# Patient Record
Sex: FEMALE | Race: WHITE | NOT HISPANIC OR LATINO | URBAN - METROPOLITAN AREA
[De-identification: names, ages, dates, MRNs, and addresses within clinical notes are randomized per-mention and may not be internally consistent; named-entity substitution may affect disease eponyms.]

---

## 2017-09-04 ENCOUNTER — EMERGENCY (EMERGENCY)
Facility: HOSPITAL | Age: 72
LOS: 0 days | Discharge: HOME | End: 2017-09-04
Attending: EMERGENCY MEDICINE | Admitting: EMERGENCY MEDICINE
Payer: MEDICARE

## 2017-09-04 VITALS
SYSTOLIC BLOOD PRESSURE: 153 MMHG | TEMPERATURE: 98 F | HEART RATE: 82 BPM | RESPIRATION RATE: 18 BRPM | OXYGEN SATURATION: 100 % | HEIGHT: 61 IN | DIASTOLIC BLOOD PRESSURE: 73 MMHG | WEIGHT: 151.02 LBS

## 2017-09-04 DIAGNOSIS — S61.412A LACERATION WITHOUT FOREIGN BODY OF LEFT HAND, INITIAL ENCOUNTER: ICD-10-CM
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
## 2017-09-04 DIAGNOSIS — I10 ESSENTIAL (PRIMARY) HYPERTENSION: ICD-10-CM

## 2017-09-04 DIAGNOSIS — F32.9 MAJOR DEPRESSIVE DISORDER, SINGLE EPISODE, UNSPECIFIED: ICD-10-CM

## 2017-09-04 DIAGNOSIS — E78.5 HYPERLIPIDEMIA, UNSPECIFIED: ICD-10-CM

## 2017-09-04 DIAGNOSIS — Z79.82 LONG TERM (CURRENT) USE OF ASPIRIN: ICD-10-CM

## 2017-09-04 DIAGNOSIS — Z79.899 OTHER LONG TERM (CURRENT) DRUG THERAPY: ICD-10-CM

## 2017-09-04 PROCEDURE — 12001 RPR S/N/AX/GEN/TRNK 2.5CM/<: CPT

## 2017-09-04 PROCEDURE — 99283 EMERGENCY DEPT VISIT LOW MDM: CPT | Mod: 25

## 2017-09-04 RX ORDER — TETANUS TOXOID, REDUCED DIPHTHERIA TOXOID AND ACELLULAR PERTUSSIS VACCINE, ADSORBED 5; 2.5; 8; 8; 2.5 [IU]/.5ML; [IU]/.5ML; UG/.5ML; UG/.5ML; UG/.5ML
0.5 SUSPENSION INTRAMUSCULAR ONCE
Qty: 0 | Refills: 0 | Status: COMPLETED | OUTPATIENT
Start: 2017-09-04 | End: 2017-09-04

## 2017-09-04 RX ADMIN — TETANUS TOXOID, REDUCED DIPHTHERIA TOXOID AND ACELLULAR PERTUSSIS VACCINE, ADSORBED 0.5 MILLILITER(S): 5; 2.5; 8; 8; 2.5 SUSPENSION INTRAMUSCULAR at 05:24

## 2017-09-04 NOTE — ED PROVIDER NOTE - SKIN, MLM
Skin normal color for race, warm, dry and intact. No evidence of rash. Skin normal color for race, warm, dry and intact. No evidence of rash.  1cm superficial laceration to web space between 4th and 5th digits of right hand.  Mild venous bleeding.  N/V intact.

## 2017-09-04 NOTE — ED ADULT TRIAGE NOTE - CHIEF COMPLAINT QUOTE
Patient states that she was cutting "apart two turkey burgers, when I sliced the inside of my hand." Patient has a laceration in the crease between the fifth and the fourth fingers of her left hand. Bleeding controlled with guaze and direct pressure. Patient on aspirin.

## 2017-09-04 NOTE — ED ADULT NURSE NOTE - OBJECTIVE STATEMENT
pt arrives to ED complaining of left hand laceration. pt was trying to separate two frozen burgers with a knife and cut inside her fourth and fifth fingers on her left hand. pt alert and oriented x 4. vss.

## 2018-07-01 ENCOUNTER — EMERGENCY (EMERGENCY)
Facility: HOSPITAL | Age: 73
LOS: 0 days | Discharge: HOME | End: 2018-07-01
Attending: EMERGENCY MEDICINE | Admitting: EMERGENCY MEDICINE
Payer: MEDICARE

## 2018-07-01 VITALS
SYSTOLIC BLOOD PRESSURE: 139 MMHG | TEMPERATURE: 98 F | HEART RATE: 88 BPM | RESPIRATION RATE: 18 BRPM | OXYGEN SATURATION: 97 % | DIASTOLIC BLOOD PRESSURE: 85 MMHG

## 2018-07-01 VITALS — WEIGHT: 160.06 LBS
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
## 2018-07-01 DIAGNOSIS — S30.860A INSECT BITE (NONVENOMOUS) OF LOWER BACK AND PELVIS, INITIAL ENCOUNTER: ICD-10-CM
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
## 2018-07-01 PROCEDURE — 99283 EMERGENCY DEPT VISIT LOW MDM: CPT

## 2018-07-01 NOTE — ED STATDOCS - PROGRESS NOTE DETAILS
Patient seen and evaluated with ED attending at intake.  Unable to locate puncture wound on patient's skin, she removed entire tick including head, tick is alive and not engorged, low chance of transmission as it has not been attached for a significant amount of time.  Will send tick for analysis -Arnoldo Nolasco PA-C

## 2018-07-01 NOTE — ED ADULT TRIAGE NOTE - CHIEF COMPLAINT QUOTE
pt c/o tick btie to left flank area. pt removed the tick, was unable to see if she had a rash, pt concerened it may be a deer tick, tick in jar. no fevers, unknown how long tick was on body

## 2018-07-01 NOTE — ED STATDOCS - ATTENDING CONTRIBUTION TO CARE
I, Ajay French MD, personally saw the patient with the PA, and completed the key components of the history and physical exam. I then discussed the management plan with the PA.

## 2018-07-01 NOTE — ED STATDOCS - MEDICAL DECISION MAKING DETAILS
elderly female ambulatory to ED after evaluation self removed tick from left lower back, patient essentially asymptomatic, pe : no rash, no puncture mohini; tick in  jar intact +living, will send tick to lab for analysis no indication for propylitic ABx, PCP follow up this week.

## 2018-07-01 NOTE — ED STATDOCS - OBJECTIVE STATEMENT
74 y/o F with a PMHx of hypothyroid, HTN, HLD, Depression presents to the ED for evaluation s/p tick bite. Pt was bit by a tick a couple of hours ago while she was watering plants in her front yard. Pt notes tick bite to the left lower back, +sensitivity to site. Pt pulled the tick off by her fingers. No known bleeding to site. Patient [place] tick infested neighborhood. Alive Tick at bedside encaptured in benja jar. Allergic to Penicillin, synthetic thyroid, Codeine, Sulfa.  PMD .

## 2018-07-01 NOTE — ED STATDOCS - SKIN, MLM
no erythema, no swelling, no definite puncture mohini, no rash. skin normal color for race, warm, dry and intact.

## 2018-07-04 LAB
CULTURE RESULTS: SIGNIFICANT CHANGE UP
SPECIMEN SOURCE: SIGNIFICANT CHANGE UP

## 2019-10-16 RX ORDER — AZITHROMYCIN 500 MG/1
0 TABLET, FILM COATED ORAL
Qty: 0 | Refills: 0 | DISCHARGE
Start: 2019-10-16 | End: 2019-10-20

## 2019-10-31 ENCOUNTER — INPATIENT (INPATIENT)
Facility: HOSPITAL | Age: 74
LOS: 1 days | Discharge: HOME | DRG: 641 | End: 2019-11-02
Attending: INTERNAL MEDICINE | Admitting: EMERGENCY MEDICINE
Payer: MEDICARE

## 2019-10-31 VITALS
DIASTOLIC BLOOD PRESSURE: 66 MMHG | HEART RATE: 96 BPM | HEIGHT: 60 IN | WEIGHT: 167.99 LBS | TEMPERATURE: 98 F | SYSTOLIC BLOOD PRESSURE: 145 MMHG | OXYGEN SATURATION: 100 % | RESPIRATION RATE: 18 BRPM

## 2019-10-31 DIAGNOSIS — R53.1 WEAKNESS: ICD-10-CM

## 2019-10-31 PROBLEM — I10 ESSENTIAL (PRIMARY) HYPERTENSION: Chronic | Status: ACTIVE | Noted: 2017-09-04

## 2019-10-31 PROBLEM — E78.5 HYPERLIPIDEMIA, UNSPECIFIED: Chronic | Status: ACTIVE | Noted: 2017-09-04

## 2019-10-31 PROBLEM — F32.9 MAJOR DEPRESSIVE DISORDER, SINGLE EPISODE, UNSPECIFIED: Chronic | Status: ACTIVE | Noted: 2017-09-04

## 2019-10-31 LAB
ANION GAP SERPL CALC-SCNC: 5 MMOL/L — SIGNIFICANT CHANGE UP (ref 5–17)
APPEARANCE UR: ABNORMAL
BILIRUB UR-MCNC: NEGATIVE — SIGNIFICANT CHANGE UP
BUN SERPL-MCNC: 17 MG/DL — SIGNIFICANT CHANGE UP (ref 7–23)
CALCIUM SERPL-MCNC: 8.9 MG/DL — SIGNIFICANT CHANGE UP (ref 8.5–10.1)
CHLORIDE SERPL-SCNC: 107 MMOL/L — SIGNIFICANT CHANGE UP (ref 96–108)
CO2 SERPL-SCNC: 26 MMOL/L — SIGNIFICANT CHANGE UP (ref 22–31)
COLOR SPEC: YELLOW — SIGNIFICANT CHANGE UP
CREAT SERPL-MCNC: 0.87 MG/DL — SIGNIFICANT CHANGE UP (ref 0.5–1.3)
DIFF PNL FLD: ABNORMAL
GLUCOSE SERPL-MCNC: 100 MG/DL — HIGH (ref 70–99)
GLUCOSE UR QL: NEGATIVE MG/DL — SIGNIFICANT CHANGE UP
HCT VFR BLD CALC: 41.9 % — SIGNIFICANT CHANGE UP (ref 34.5–45)
HGB BLD-MCNC: 13.7 G/DL — SIGNIFICANT CHANGE UP (ref 11.5–15.5)
KETONES UR-MCNC: ABNORMAL
LEUKOCYTE ESTERASE UR-ACNC: ABNORMAL
MCHC RBC-ENTMCNC: 30.2 PG — SIGNIFICANT CHANGE UP (ref 27–34)
MCHC RBC-ENTMCNC: 32.7 GM/DL — SIGNIFICANT CHANGE UP (ref 32–36)
MCV RBC AUTO: 92.3 FL — SIGNIFICANT CHANGE UP (ref 80–100)
NITRITE UR-MCNC: NEGATIVE — SIGNIFICANT CHANGE UP
PH UR: 6 — SIGNIFICANT CHANGE UP (ref 5–8)
PLATELET # BLD AUTO: 191 K/UL — SIGNIFICANT CHANGE UP (ref 150–400)
POTASSIUM SERPL-MCNC: 3.9 MMOL/L — SIGNIFICANT CHANGE UP (ref 3.5–5.3)
POTASSIUM SERPL-SCNC: 3.9 MMOL/L — SIGNIFICANT CHANGE UP (ref 3.5–5.3)
PROT UR-MCNC: 15 MG/DL
RBC # BLD: 4.54 M/UL — SIGNIFICANT CHANGE UP (ref 3.8–5.2)
RBC # FLD: 13.2 % — SIGNIFICANT CHANGE UP (ref 10.3–14.5)
SODIUM SERPL-SCNC: 138 MMOL/L — SIGNIFICANT CHANGE UP (ref 135–145)
SP GR SPEC: 1.02 — SIGNIFICANT CHANGE UP (ref 1.01–1.02)
TROPONIN I SERPL-MCNC: 0.07 NG/ML — HIGH (ref 0.01–0.04)
TROPONIN I SERPL-MCNC: 0.07 NG/ML — HIGH (ref 0.01–0.04)
UROBILINOGEN FLD QL: 1 MG/DL
WBC # BLD: 5.29 K/UL — SIGNIFICANT CHANGE UP (ref 3.8–10.5)
WBC # FLD AUTO: 5.29 K/UL — SIGNIFICANT CHANGE UP (ref 3.8–10.5)

## 2019-10-31 PROCEDURE — 71045 X-RAY EXAM CHEST 1 VIEW: CPT | Mod: 26

## 2019-10-31 PROCEDURE — 86803 HEPATITIS C AB TEST: CPT

## 2019-10-31 PROCEDURE — 80061 LIPID PANEL: CPT

## 2019-10-31 PROCEDURE — 83735 ASSAY OF MAGNESIUM: CPT

## 2019-10-31 PROCEDURE — 83036 HEMOGLOBIN GLYCOSYLATED A1C: CPT

## 2019-10-31 PROCEDURE — 93306 TTE W/DOPPLER COMPLETE: CPT | Mod: 26

## 2019-10-31 PROCEDURE — 93005 ELECTROCARDIOGRAM TRACING: CPT

## 2019-10-31 PROCEDURE — 84100 ASSAY OF PHOSPHORUS: CPT

## 2019-10-31 PROCEDURE — 36415 COLL VENOUS BLD VENIPUNCTURE: CPT

## 2019-10-31 PROCEDURE — 84443 ASSAY THYROID STIM HORMONE: CPT

## 2019-10-31 PROCEDURE — 80048 BASIC METABOLIC PNL TOTAL CA: CPT

## 2019-10-31 PROCEDURE — 84484 ASSAY OF TROPONIN QUANT: CPT

## 2019-10-31 PROCEDURE — 70450 CT HEAD/BRAIN W/O DYE: CPT | Mod: 26

## 2019-10-31 PROCEDURE — 85025 COMPLETE CBC W/AUTO DIFF WBC: CPT

## 2019-10-31 RX ORDER — MIRTAZAPINE 45 MG/1
30 TABLET, ORALLY DISINTEGRATING ORAL AT BEDTIME
Refills: 0 | Status: DISCONTINUED | OUTPATIENT
Start: 2019-10-31 | End: 2019-11-02

## 2019-10-31 RX ORDER — ASPIRIN/CALCIUM CARB/MAGNESIUM 324 MG
325 TABLET ORAL DAILY
Refills: 0 | Status: DISCONTINUED | OUTPATIENT
Start: 2019-10-31 | End: 2019-11-01

## 2019-10-31 RX ORDER — THYROID 120 MG
0 TABLET ORAL
Qty: 0 | Refills: 0 | DISCHARGE

## 2019-10-31 RX ORDER — CITALOPRAM 10 MG/1
0 TABLET, FILM COATED ORAL
Qty: 0 | Refills: 0 | DISCHARGE

## 2019-10-31 RX ORDER — ARIPIPRAZOLE 15 MG/1
5 TABLET ORAL DAILY
Refills: 0 | Status: DISCONTINUED | OUTPATIENT
Start: 2019-10-31 | End: 2019-11-02

## 2019-10-31 RX ORDER — ONDANSETRON 8 MG/1
4 TABLET, FILM COATED ORAL EVERY 6 HOURS
Refills: 0 | Status: DISCONTINUED | OUTPATIENT
Start: 2019-10-31 | End: 2019-11-02

## 2019-10-31 RX ORDER — ASPIRIN/CALCIUM CARB/MAGNESIUM 324 MG
1 TABLET ORAL
Qty: 0 | Refills: 0 | DISCHARGE

## 2019-10-31 RX ORDER — MIRTAZAPINE 45 MG/1
0 TABLET, ORALLY DISINTEGRATING ORAL
Qty: 0 | Refills: 0 | DISCHARGE

## 2019-10-31 RX ORDER — BUPROPION HYDROCHLORIDE 150 MG/1
0 TABLET, EXTENDED RELEASE ORAL
Qty: 0 | Refills: 0 | DISCHARGE

## 2019-10-31 RX ORDER — METOPROLOL TARTRATE 50 MG
0 TABLET ORAL
Qty: 0 | Refills: 0 | DISCHARGE

## 2019-10-31 RX ORDER — SODIUM CHLORIDE 9 MG/ML
1000 INJECTION INTRAMUSCULAR; INTRAVENOUS; SUBCUTANEOUS
Refills: 0 | Status: DISCONTINUED | OUTPATIENT
Start: 2019-10-31 | End: 2019-11-02

## 2019-10-31 RX ORDER — CLONAZEPAM 1 MG
0.5 TABLET ORAL
Refills: 0 | Status: DISCONTINUED | OUTPATIENT
Start: 2019-10-31 | End: 2019-11-02

## 2019-10-31 RX ORDER — ASPIRIN/CALCIUM CARB/MAGNESIUM 324 MG
325 TABLET ORAL ONCE
Refills: 0 | Status: COMPLETED | OUTPATIENT
Start: 2019-10-31 | End: 2019-10-31

## 2019-10-31 RX ORDER — CITALOPRAM 10 MG/1
40 TABLET, FILM COATED ORAL DAILY
Refills: 0 | Status: DISCONTINUED | OUTPATIENT
Start: 2019-10-31 | End: 2019-11-02

## 2019-10-31 RX ORDER — METOPROLOL TARTRATE 50 MG
25 TABLET ORAL DAILY
Refills: 0 | Status: DISCONTINUED | OUTPATIENT
Start: 2019-10-31 | End: 2019-11-02

## 2019-10-31 RX ADMIN — Medication 325 MILLIGRAM(S): at 12:30

## 2019-10-31 RX ADMIN — Medication 0.5 MILLIGRAM(S): at 17:12

## 2019-10-31 RX ADMIN — MIRTAZAPINE 30 MILLIGRAM(S): 45 TABLET, ORALLY DISINTEGRATING ORAL at 21:53

## 2019-10-31 NOTE — H&P ADULT - NSHPLABSRESULTS_GEN_ALL_CORE
CARDIAC MARKERS ( 31 Oct 2019 14:11 )  0.075 ng/mL / x     / x     / x     / x      CARDIAC MARKERS ( 31 Oct 2019 11:18 )  0.067 ng/mL / x     / x     / x     / x                                13.7   5.29  )-----------( 191      ( 31 Oct 2019 11:18 )             41.9     31 Oct 2019 11:18    138    |  107    |  17     ----------------------------<  100    3.9     |  26     |  0.87     Ca    8.9        31 Oct 2019 11:18        CAPILLARY BLOOD GLUCOSE          Urinalysis Basic - ( 31 Oct 2019 11:18 )    Color: Yellow / Appearance: Slightly Turbid / S.020 / pH: x  Gluc: x / Ketone: Small  / Bili: Negative / Urobili: 1 mg/dL   Blood: x / Protein: 15 mg/dL / Nitrite: Negative   Leuk Esterase: Small / RBC: 6-10 /HPF / WBC 3-5   Sq Epi: x / Non Sq Epi: Few / Bacteria: x

## 2019-10-31 NOTE — H&P ADULT - NSHPREVIEWOFSYSTEMS_GEN_ALL_CORE
(-)Fever, chills, cough, sob, headache, dizziness, palpitations, abd pain, n/v/d, leg swelling  (+)weakness, CP

## 2019-10-31 NOTE — ED ADULT TRIAGE NOTE - CHIEF COMPLAINT QUOTE
pt brought by EMS from home for eval of feeling weak and unwell since last week. pt notes she is being treated for a foot infection. denies trauma or fall. no nuchal rigidity noted. no fever noted.

## 2019-10-31 NOTE — ED ADULT NURSE NOTE - NSIMPLEMENTINTERV_GEN_ALL_ED
Implemented All Fall Risk Interventions:  [place] to call system. Call bell, personal items and telephone within reach. Instruct patient to call for assistance. Room bathroom lighting operational. Non-slip footwear when patient is off stretcher. Physically safe environment: no spills, clutter or unnecessary equipment. Stretcher in lowest position, wheels locked, appropriate side rails in place. Provide visual cue, wrist band, yellow gown, etc. Monitor gait and stability. Monitor for mental status changes and reorient to person, place, and time. Review medications for side effects contributing to fall risk. Reinforce activity limits and safety measures with patient and family.

## 2019-10-31 NOTE — ED ADULT NURSE REASSESSMENT NOTE - NS ED NURSE REASSESS COMMENT FT1
Patient A&Ox4, resting in bed. VSS, denies pain/discomfort at this time. Generalized weakness, patient assisted with repositioning for comfort & safety. No s/s of distress present. Wait time explained, hospitalist consult pending. Purposeful proactive rounding completed.

## 2019-10-31 NOTE — H&P ADULT - HISTORY OF PRESENT ILLNESS
Patient is 75yo female with PMHx depression, HTN, HLD, presents to the hospital with complaints generalized fatigue, weakness, and CP. Pt reports symptoms began last night, characterized by weakness, fatigue, and CP. Pt notes the symptoms lasted few hours, and now have dissipated. Currently denies fever, chills, CP, SOB, palpitations, HA, dizziness. No other constitutional symptoms.

## 2019-10-31 NOTE — ED PROVIDER NOTE - OBJECTIVE STATEMENT
73 y/o female with a PMHx of depression, HTN, HLD presents to the ED BIBEMS c/o generalized weakness. +neck pain. Pt notes she ran out of her medications. Pt reports having CP last night which has resolved. No recent falls. No recent trauma. Allergies: Codeine, Synthroid, Penicillin. Nonsmoker. No EtOH use. No drug use. No other complaints at this time. PCP: Dr. Santos.

## 2019-10-31 NOTE — ED ADULT NURSE REASSESSMENT NOTE - NS ED NURSE REASSESS COMMENT FT1
Patient remains as previously assessed. No reports of pain or s/s of distress on my time. Hand-off report to RN Ninfa, awaiting transport to . Safety & comfort measures in place, purposeful proactive rounding on my time.

## 2019-10-31 NOTE — ED ADULT NURSE NOTE - OBJECTIVE STATEMENT
Pt presents to the ED for eval of generalized weakness, feeling "unwell" and states she is confused as of lately. Pt states she had an episode of chest discomfort last evening which has since resolved. Pt a+ox3 but clearly confused at times. Pt states she lives at home by herself and doesn't have family.

## 2019-11-01 LAB
ADD ON TEST-SPECIMEN IN LAB: SIGNIFICANT CHANGE UP
ADD ON TEST-SPECIMEN IN LAB: SIGNIFICANT CHANGE UP
ANION GAP SERPL CALC-SCNC: 7 MMOL/L — SIGNIFICANT CHANGE UP (ref 5–17)
BASOPHILS # BLD AUTO: 0.02 K/UL — SIGNIFICANT CHANGE UP (ref 0–0.2)
BASOPHILS NFR BLD AUTO: 0.3 % — SIGNIFICANT CHANGE UP (ref 0–2)
BUN SERPL-MCNC: 13 MG/DL — SIGNIFICANT CHANGE UP (ref 7–23)
CALCIUM SERPL-MCNC: 8.3 MG/DL — LOW (ref 8.5–10.1)
CHLORIDE SERPL-SCNC: 111 MMOL/L — HIGH (ref 96–108)
CHOLEST SERPL-MCNC: 144 MG/DL — SIGNIFICANT CHANGE UP (ref 10–199)
CO2 SERPL-SCNC: 24 MMOL/L — SIGNIFICANT CHANGE UP (ref 22–31)
CREAT SERPL-MCNC: 0.76 MG/DL — SIGNIFICANT CHANGE UP (ref 0.5–1.3)
EOSINOPHIL # BLD AUTO: 0.07 K/UL — SIGNIFICANT CHANGE UP (ref 0–0.5)
EOSINOPHIL NFR BLD AUTO: 1.2 % — SIGNIFICANT CHANGE UP (ref 0–6)
GLUCOSE SERPL-MCNC: 74 MG/DL — SIGNIFICANT CHANGE UP (ref 70–99)
HBA1C BLD-MCNC: 5.6 % — SIGNIFICANT CHANGE UP (ref 4–5.6)
HCT VFR BLD CALC: 38.5 % — SIGNIFICANT CHANGE UP (ref 34.5–45)
HCV AB S/CO SERPL IA: 0.18 S/CO — SIGNIFICANT CHANGE UP (ref 0–0.99)
HCV AB SERPL-IMP: SIGNIFICANT CHANGE UP
HDLC SERPL-MCNC: 37 MG/DL — LOW
HGB BLD-MCNC: 12.6 G/DL — SIGNIFICANT CHANGE UP (ref 11.5–15.5)
IMM GRANULOCYTES NFR BLD AUTO: 0.3 % — SIGNIFICANT CHANGE UP (ref 0–1.5)
LIPID PNL WITH DIRECT LDL SERPL: 87 MG/DL — SIGNIFICANT CHANGE UP
LYMPHOCYTES # BLD AUTO: 1.5 K/UL — SIGNIFICANT CHANGE UP (ref 1–3.3)
LYMPHOCYTES # BLD AUTO: 26.2 % — SIGNIFICANT CHANGE UP (ref 13–44)
MAGNESIUM SERPL-MCNC: 1.8 MG/DL — SIGNIFICANT CHANGE UP (ref 1.6–2.6)
MCHC RBC-ENTMCNC: 30 PG — SIGNIFICANT CHANGE UP (ref 27–34)
MCHC RBC-ENTMCNC: 32.7 GM/DL — SIGNIFICANT CHANGE UP (ref 32–36)
MCV RBC AUTO: 91.7 FL — SIGNIFICANT CHANGE UP (ref 80–100)
MONOCYTES # BLD AUTO: 0.57 K/UL — SIGNIFICANT CHANGE UP (ref 0–0.9)
MONOCYTES NFR BLD AUTO: 9.9 % — SIGNIFICANT CHANGE UP (ref 2–14)
NEUTROPHILS # BLD AUTO: 3.55 K/UL — SIGNIFICANT CHANGE UP (ref 1.8–7.4)
NEUTROPHILS NFR BLD AUTO: 62.1 % — SIGNIFICANT CHANGE UP (ref 43–77)
PHOSPHATE SERPL-MCNC: 2.7 MG/DL — SIGNIFICANT CHANGE UP (ref 2.5–4.5)
PLATELET # BLD AUTO: 163 K/UL — SIGNIFICANT CHANGE UP (ref 150–400)
POTASSIUM SERPL-MCNC: 3.2 MMOL/L — LOW (ref 3.5–5.3)
POTASSIUM SERPL-SCNC: 3.2 MMOL/L — LOW (ref 3.5–5.3)
RBC # BLD: 4.2 M/UL — SIGNIFICANT CHANGE UP (ref 3.8–5.2)
RBC # FLD: 13.3 % — SIGNIFICANT CHANGE UP (ref 10.3–14.5)
SODIUM SERPL-SCNC: 142 MMOL/L — SIGNIFICANT CHANGE UP (ref 135–145)
TOTAL CHOLESTEROL/HDL RATIO MEASUREMENT: 3.9 RATIO — SIGNIFICANT CHANGE UP (ref 3.3–7.1)
TRIGL SERPL-MCNC: 100 MG/DL — SIGNIFICANT CHANGE UP (ref 10–149)
TSH SERPL-MCNC: 1.48 UU/ML — SIGNIFICANT CHANGE UP (ref 0.34–4.82)
WBC # BLD: 5.73 K/UL — SIGNIFICANT CHANGE UP (ref 3.8–10.5)
WBC # FLD AUTO: 5.73 K/UL — SIGNIFICANT CHANGE UP (ref 3.8–10.5)

## 2019-11-01 PROCEDURE — 93010 ELECTROCARDIOGRAM REPORT: CPT

## 2019-11-01 RX ORDER — THYROID 120 MG
1 TABLET ORAL
Qty: 0 | Refills: 0 | DISCHARGE

## 2019-11-01 RX ORDER — HEPARIN SODIUM 5000 [USP'U]/ML
5000 INJECTION INTRAVENOUS; SUBCUTANEOUS EVERY 8 HOURS
Refills: 0 | Status: DISCONTINUED | OUTPATIENT
Start: 2019-11-01 | End: 2019-11-02

## 2019-11-01 RX ORDER — ASPIRIN/CALCIUM CARB/MAGNESIUM 324 MG
81 TABLET ORAL DAILY
Refills: 0 | Status: DISCONTINUED | OUTPATIENT
Start: 2019-11-01 | End: 2019-11-02

## 2019-11-01 RX ORDER — POLYETHYLENE GLYCOL 3350 17 G/17G
17 POWDER, FOR SOLUTION ORAL DAILY
Refills: 0 | Status: DISCONTINUED | OUTPATIENT
Start: 2019-11-01 | End: 2019-11-02

## 2019-11-01 RX ORDER — ACETAMINOPHEN 500 MG
650 TABLET ORAL ONCE
Refills: 0 | Status: COMPLETED | OUTPATIENT
Start: 2019-11-01 | End: 2019-11-01

## 2019-11-01 RX ORDER — POTASSIUM CHLORIDE 20 MEQ
40 PACKET (EA) ORAL EVERY 4 HOURS
Refills: 0 | Status: COMPLETED | OUTPATIENT
Start: 2019-11-01 | End: 2019-11-01

## 2019-11-01 RX ORDER — MUPIROCIN 20 MG/G
1 OINTMENT TOPICAL
Qty: 0 | Refills: 0 | DISCHARGE

## 2019-11-01 RX ADMIN — POLYETHYLENE GLYCOL 3350 17 GRAM(S): 17 POWDER, FOR SOLUTION ORAL at 09:52

## 2019-11-01 RX ADMIN — CITALOPRAM 40 MILLIGRAM(S): 10 TABLET, FILM COATED ORAL at 11:49

## 2019-11-01 RX ADMIN — Medication 0.5 MILLIGRAM(S): at 05:27

## 2019-11-01 RX ADMIN — Medication 81 MILLIGRAM(S): at 11:53

## 2019-11-01 RX ADMIN — Medication 40 MILLIEQUIVALENT(S): at 09:54

## 2019-11-01 RX ADMIN — Medication 650 MILLIGRAM(S): at 21:37

## 2019-11-01 RX ADMIN — ARIPIPRAZOLE 5 MILLIGRAM(S): 15 TABLET ORAL at 11:49

## 2019-11-01 RX ADMIN — Medication 40 MILLIEQUIVALENT(S): at 15:37

## 2019-11-01 RX ADMIN — HEPARIN SODIUM 5000 UNIT(S): 5000 INJECTION INTRAVENOUS; SUBCUTANEOUS at 15:37

## 2019-11-01 RX ADMIN — Medication 1 TABLET(S): at 11:51

## 2019-11-01 RX ADMIN — Medication 650 MILLIGRAM(S): at 20:18

## 2019-11-01 RX ADMIN — Medication 0.5 MILLIGRAM(S): at 17:35

## 2019-11-01 RX ADMIN — MIRTAZAPINE 30 MILLIGRAM(S): 45 TABLET, ORALLY DISINTEGRATING ORAL at 21:15

## 2019-11-01 RX ADMIN — Medication 25 MILLIGRAM(S): at 05:27

## 2019-11-01 RX ADMIN — HEPARIN SODIUM 5000 UNIT(S): 5000 INJECTION INTRAVENOUS; SUBCUTANEOUS at 21:15

## 2019-11-01 NOTE — CONSULT NOTE ADULT - SUBJECTIVE AND OBJECTIVE BOX
CHIEF COMPLAINT: Patient is a 74y old  Female who presents with a chief complaint of weakness, chest pain (31 Oct 2019 15:13)      HPI:  Patient is 73yo female with PMHx depression, HTN, HLD, presents to the hospital with complaints generalized fatigue, weakness, and CP. Pt reports symptoms began last night, characterized by weakness, fatigue, and CP. Pt notes the symptoms lasted few hours, and now have dissipated. Currently denies fever, chills, CP, SOB, palpitations, HA, dizziness. No other constitutional symptoms. (31 Oct 2019 15:13)      PMHx: PAST MEDICAL & SURGICAL HISTORY:  HLD (hyperlipidemia)  HTN (hypertension)  Depression  No significant past surgical history        Soc Hx:       Allergies: Allergies    codeine (Unknown)  penicillins (Other)  Synthroid (Other)    Intolerances          REVIEW OF SYSTEMS:    CONSTITUTIONAL: No weakness, fevers or chills  EYES/ENT: No visual changes;  No vertigo or throat pain   NECK: No pain or stiffness  RESPIRATORY: No cough, wheezing, hemoptysis; No shortness of breath  CARDIOVASCULAR: No chest pain or palpitations  GASTROINTESTINAL: No abdominal or epigastric pain. No nausea, vomiting, or hematemesis; No diarrhea or constipation. No melena or hematochezia.  GENITOURINARY: No dysuria, frequency or hematuria  NEUROLOGICAL: No numbness or weakness  SKIN: No itching, burning, rashes, or lesions   All other review of systems is negative unless indicated above    Vital Signs Last 24 Hrs  T(C): 36.5 (01 Nov 2019 05:17), Max: 36.9 (31 Oct 2019 15:50)  T(F): 97.7 (01 Nov 2019 05:17), Max: 98.5 (31 Oct 2019 15:50)  HR: 81 (01 Nov 2019 05:17) (80 - 96)  BP: 127/61 (01 Nov 2019 05:17) (123/53 - 154/71)  BP(mean): --  RR: 18 (01 Nov 2019 05:17) (16 - 18)  SpO2: 94% (01 Nov 2019 05:17) (94% - 100%)    I&O's Summary    31 Oct 2019 07:01  -  01 Nov 2019 07:00  --------------------------------------------------------  IN: 969.7 mL / OUT: 0 mL / NET: 969.7 mL            PHYSICAL EXAM:   Constitutional: NAD, awake and alert, well-developed  HEENT: PERR, EOMI, Normal Hearing, MMM  Neck: Soft and supple, No LAD, No JVD  Respiratory: Breath sounds are clear bilaterally, No wheezing, rales or rhonchi  Cardiovascular: S1 and S2, regular rate and rhythm, no Murmurs, gallops or rubs  Gastrointestinal: Bowel Sounds present, soft, nontender, nondistended, no guarding, no rebound  Extremities: No peripheral edema  Vascular: 2+ peripheral pulses  Neurological: A/O x 3, no focal deficits  Musculoskeletal: 5/5 strength b/l upper and lower extremities  Skin: No rashes    MEDICATIONS:  MEDICATIONS  (STANDING):  ARIPiprazole 5 milliGRAM(s) Oral daily  aspirin 325 milliGRAM(s) Oral daily  citalopram 40 milliGRAM(s) Oral daily  clonazePAM  Tablet 0.5 milliGRAM(s) Oral two times a day  metoprolol succinate ER 25 milliGRAM(s) Oral daily  mirtazapine 30 milliGRAM(s) Oral at bedtime  multivitamin 1 Tablet(s) Oral daily  sodium chloride 0.9%. 1000 milliLiter(s) (80 mL/Hr) IV Continuous <Continuous>      LABS: All Labs Reviewed:                        13.7   5.29  )-----------( 191      ( 31 Oct 2019 11:18 )             41.9     10-31    138  |  107  |  17  ----------------------------<  100<H>  3.9   |  26  |  0.87    Ca    8.9      31 Oct 2019 11:18        CARDIAC MARKERS ( 31 Oct 2019 14:11 )  0.075 ng/mL / x     / x     / x     / x      CARDIAC MARKERS ( 31 Oct 2019 11:18 )  0.067 ng/mL / x     / x     / x     / x          EKG:< from: 12 Lead ECG (10.31.19 @ 11:00) >  Diagnosis Line Normal sinus rhythm  Minimal voltage criteria for LVH, may be normal variant  T wave abnormality, consider anterolateral ischemia  Abnormal ECG  When compared with ECG of 27-OCT-2014 12:41,  T wave inversion now evident in Anterolateral leads  QT has lengthened  Confirmed by GARRET ELLER, NINO ([id number]) on 10/31/2019 5:39:03 PM    < end of copied text >      Telemetry: SR    ECHO:

## 2019-11-01 NOTE — CONSULT NOTE ADULT - ASSESSMENT
74 F with HTN and chol-  with weakness       No evidence for ACS-  minimal elevation in trop without chest pain unlikely to be acute coronary issue      check echo , if normal, no contraindication to DC

## 2019-11-01 NOTE — PROGRESS NOTE ADULT - SUBJECTIVE AND OBJECTIVE BOX
CHIEF COMPLAINT:    SUBJECTIVE:     REVIEW OF SYSTEMS:    CONSTITUTIONAL: No weakness, fevers or chills  EYES/ENT: No visual changes;  No vertigo or throat pain   NECK: No pain or stiffness  RESPIRATORY: No cough, wheezing, hemoptysis; No shortness of breath  CARDIOVASCULAR: No chest pain or palpitations  GASTROINTESTINAL: No abdominal or epigastric pain. No nausea, vomiting, or hematemesis; No diarrhea or constipation. No melena or hematochezia.  GENITOURINARY: No dysuria, frequency or hematuria  NEUROLOGICAL: No numbness or weakness  SKIN: No itching, burning, rashes, or lesions   All other review of systems is negative unless indicated above    Vital Signs Last 24 Hrs  T(C): 36.5 (01 Nov 2019 05:17), Max: 36.9 (31 Oct 2019 15:50)  T(F): 97.7 (01 Nov 2019 05:17), Max: 98.5 (31 Oct 2019 15:50)  HR: 81 (01 Nov 2019 05:17) (80 - 96)  BP: 127/61 (01 Nov 2019 05:17) (123/53 - 154/71)  BP(mean): --  RR: 18 (01 Nov 2019 05:17) (16 - 18)  SpO2: 94% (01 Nov 2019 05:17) (94% - 100%)    I&O's Summary    31 Oct 2019 07:01  -  01 Nov 2019 07:00  --------------------------------------------------------  IN: 969.7 mL / OUT: 0 mL / NET: 969.7 mL        CAPILLARY BLOOD GLUCOSE          PHYSICAL EXAM:    Constitutional: NAD, awake and alert, well-developed  HEENT: PERR, EOMI, Normal Hearing, MMM  Neck: Soft and supple, No LAD, No JVD  Respiratory: Breath sounds are clear bilaterally, No wheezing, rales or rhonchi  Cardiovascular: S1 and S2, regular rate and rhythm, no Murmurs, gallops or rubs  Gastrointestinal: Bowel Sounds present, soft, nontender, nondistended, no guarding, no rebound  Extremities: No peripheral edema  Vascular: 2+ peripheral pulses  Neurological: A/O x 3, no focal deficits  Musculoskeletal: 5/5 strength b/l upper and lower extremities  Skin: No rashes    MEDICATIONS:  MEDICATIONS  (STANDING):  ARIPiprazole 5 milliGRAM(s) Oral daily  aspirin 325 milliGRAM(s) Oral daily  citalopram 40 milliGRAM(s) Oral daily  clonazePAM  Tablet 0.5 milliGRAM(s) Oral two times a day  metoprolol succinate ER 25 milliGRAM(s) Oral daily  mirtazapine 30 milliGRAM(s) Oral at bedtime  multivitamin 1 Tablet(s) Oral daily  sodium chloride 0.9%. 1000 milliLiter(s) (80 mL/Hr) IV Continuous <Continuous>      LABS: All Labs Reviewed:                        13.7   5.29  )-----------( 191      ( 31 Oct 2019 11:18 )             41.9     10-31    138  |  107  |  17  ----------------------------<  100<H>  3.9   |  26  |  0.87    Ca    8.9      31 Oct 2019 11:18        CARDIAC MARKERS ( 31 Oct 2019 14:11 )  0.075 ng/mL / x     / x     / x     / x      CARDIAC MARKERS ( 31 Oct 2019 11:18 )  0.067 ng/mL / x     / x     / x     / x            Assessment and Plan:   	  Patient is 75yo female with PMHx depression, HTN, HLD, presents to the hospital with complaints generalized fatigue, weakness, and CP. Pt reports symptoms began last night, characterized by weakness, fatigue, and CP.      1-Fatigue/ CP  -troponins .067 > .075  -echo pending  -cardiology consult    2-HTN    3-Depression    4-Hypothyroid    - currently afebrile, HD stable, comfortable in NAd  - no CP, SOB currently  - EKG NSR, nml axis, NSST changes  - mildly elevated troponin, cardiology consulted    PLAN:  - supp o2 as needed, duonebs PRN  - NO ID issues  - ECHO  - ASA, Statin, BB  - cardio eval  - IVF  - BP control  - Abilify, Celexa  - GI ppx  - DVT ppx  - PT eval  - Admit CHIEF COMPLAINT/Diagnosis: Chest pain/ Fatigue    SUBJECTIVE: no complaints    REVIEW OF SYSTEMS:    CONSTITUTIONAL: No weakness, fevers or chills  EYES/ENT: No visual changes;  No vertigo or throat pain   NECK: No pain or stiffness  RESPIRATORY: No cough, wheezing, hemoptysis; No shortness of breath  CARDIOVASCULAR: No chest pain or palpitations  GASTROINTESTINAL: No abdominal or epigastric pain. No nausea, vomiting, or hematemesis; No diarrhea or constipation. No melena or hematochezia.  GENITOURINARY: No dysuria, frequency or hematuria  NEUROLOGICAL: No numbness or weakness  SKIN: No itching, burning, rashes, or lesions   All other review of systems is negative unless indicated above    Vital Signs Last 24 Hrs  T(C): 36.5 (01 Nov 2019 05:17), Max: 36.9 (31 Oct 2019 15:50)  T(F): 97.7 (01 Nov 2019 05:17), Max: 98.5 (31 Oct 2019 15:50)  HR: 81 (01 Nov 2019 05:17) (80 - 96)  BP: 127/61 (01 Nov 2019 05:17) (123/53 - 154/71)  BP(mean): --  RR: 18 (01 Nov 2019 05:17) (16 - 18)  SpO2: 94% (01 Nov 2019 05:17) (94% - 100%)    I&O's Summary    31 Oct 2019 07:01  -  01 Nov 2019 07:00  --------------------------------------------------------  IN: 969.7 mL / OUT: 0 mL / NET: 969.7 mL        CAPILLARY BLOOD GLUCOSE          PHYSICAL EXAM:    Constitutional: NAD, awake and alert, well-developed  HEENT: PERR, EOMI, Normal Hearing, MMM  Neck: Soft and supple, No LAD, No JVD  Respiratory: Breath sounds are clear bilaterally, No wheezing, rales or rhonchi  Cardiovascular: S1 and S2, regular rate and rhythm, no Murmurs, gallops or rubs  Gastrointestinal: Bowel Sounds present, soft, nontender, nondistended, no guarding, no rebound  Extremities: No peripheral edema  Vascular: 2+ peripheral pulses  Neurological: A/O x 3, no focal deficits  Musculoskeletal: 5/5 strength b/l upper and lower extremities  Skin: No rashes    MEDICATIONS:  MEDICATIONS  (STANDING):  ARIPiprazole 5 milliGRAM(s) Oral daily  aspirin 325 milliGRAM(s) Oral daily  citalopram 40 milliGRAM(s) Oral daily  clonazePAM  Tablet 0.5 milliGRAM(s) Oral two times a day  metoprolol succinate ER 25 milliGRAM(s) Oral daily  mirtazapine 30 milliGRAM(s) Oral at bedtime  multivitamin 1 Tablet(s) Oral daily  sodium chloride 0.9%. 1000 milliLiter(s) (80 mL/Hr) IV Continuous <Continuous>      LABS: All Labs Reviewed:                        13.7   5.29  )-----------( 191      ( 31 Oct 2019 11:18 )             41.9     10-31    138  |  107  |  17  ----------------------------<  100<H>  3.9   |  26  |  0.87    Ca    8.9      31 Oct 2019 11:18        CARDIAC MARKERS ( 31 Oct 2019 14:11 )  0.075 ng/mL / x     / x     / x     / x      CARDIAC MARKERS ( 31 Oct 2019 11:18 )  0.067 ng/mL / x     / x     / x     / x            Assessment and Plan:   	  Patient is 73yo female with PMHx depression, HTN, HLD, presents to the hospital with complaints generalized fatigue, weakness, and CP. Pt reports symptoms began last night, characterized by weakness, fatigue, and CP.      1-Fatigue likley secondary to poor oral intake  -pateint admits to not eating properly and poor amoutn of adequate food at home.  -troponins .067 > .075 likely secondary to demand mediated ischemia  -echo pending  -cardiology consult appreciated  -c/w iv fluids    2-HTN- c/w meds    3-Depression- c/w meds    4-Hypothyroid- c/w meds    5-DVT proph- hep sc    6-social workers to help with meals on wheels ? or other source of food at home.

## 2019-11-02 ENCOUNTER — TRANSCRIPTION ENCOUNTER (OUTPATIENT)
Age: 74
End: 2019-11-02

## 2019-11-02 VITALS
RESPIRATION RATE: 18 BRPM | SYSTOLIC BLOOD PRESSURE: 150 MMHG | DIASTOLIC BLOOD PRESSURE: 76 MMHG | HEART RATE: 84 BPM | OXYGEN SATURATION: 96 %

## 2019-11-02 LAB
ANION GAP SERPL CALC-SCNC: 7 MMOL/L — SIGNIFICANT CHANGE UP (ref 5–17)
BUN SERPL-MCNC: 12 MG/DL — SIGNIFICANT CHANGE UP (ref 7–23)
CALCIUM SERPL-MCNC: 8.6 MG/DL — SIGNIFICANT CHANGE UP (ref 8.5–10.1)
CHLORIDE SERPL-SCNC: 108 MMOL/L — SIGNIFICANT CHANGE UP (ref 96–108)
CO2 SERPL-SCNC: 24 MMOL/L — SIGNIFICANT CHANGE UP (ref 22–31)
CREAT SERPL-MCNC: 0.69 MG/DL — SIGNIFICANT CHANGE UP (ref 0.5–1.3)
GLUCOSE SERPL-MCNC: 76 MG/DL — SIGNIFICANT CHANGE UP (ref 70–99)
POTASSIUM SERPL-MCNC: 3.8 MMOL/L — SIGNIFICANT CHANGE UP (ref 3.5–5.3)
POTASSIUM SERPL-SCNC: 3.8 MMOL/L — SIGNIFICANT CHANGE UP (ref 3.5–5.3)
SODIUM SERPL-SCNC: 139 MMOL/L — SIGNIFICANT CHANGE UP (ref 135–145)

## 2019-11-02 RX ADMIN — ARIPIPRAZOLE 5 MILLIGRAM(S): 15 TABLET ORAL at 12:21

## 2019-11-02 RX ADMIN — CITALOPRAM 40 MILLIGRAM(S): 10 TABLET, FILM COATED ORAL at 12:21

## 2019-11-02 RX ADMIN — Medication 1 TABLET(S): at 12:21

## 2019-11-02 RX ADMIN — Medication 0.5 MILLIGRAM(S): at 05:05

## 2019-11-02 RX ADMIN — Medication 81 MILLIGRAM(S): at 12:21

## 2019-11-02 RX ADMIN — Medication 25 MILLIGRAM(S): at 05:05

## 2019-11-02 RX ADMIN — HEPARIN SODIUM 5000 UNIT(S): 5000 INJECTION INTRAVENOUS; SUBCUTANEOUS at 05:06

## 2019-11-02 RX ADMIN — POLYETHYLENE GLYCOL 3350 17 GRAM(S): 17 POWDER, FOR SOLUTION ORAL at 12:20

## 2019-11-02 NOTE — DISCHARGE NOTE PROVIDER - HOSPITAL COURSE
Vital Signs Last 24 Hrs    T(C): 36.6 (2019 05:02), Max: 36.8 (2019 16:43)    T(F): 97.8 (2019 05:02), Max: 98.3 (2019 16:43)    HR: 84 (2019 10:34) (70 - 85)    BP: 150/76 (2019 10:34) (144/71 - 158/79)    BP(mean): --    RR: 18 (2019 10:34) (18 - 18)    SpO2: 96% (2019 10:34) (94% - 98%)        HEENT:   pupils equal and reactive, EOMI, no oropharyngeal lesions, erythema, exudates, oral thrush        NECK:   supple, no carotid bruits, no palpable lymph nodes, no thyromegaly        CV:  +S1, +S2, regular, no murmurs or rubs        RESP:   lungs clear to auscultation bilaterally, no wheezing, rales, rhonchi, good air entry bilaterally        BREAST:  not examined        GI:  abdomen soft, non-tender, non-distended, normal BS, no bruits, no abdominal masses, no palpable masses        RECTAL:  not examined        :  not examined        MSK:   normal muscle tone, no atrophy, no rigidity, no contractions        EXT:   no clubbing, no cyanosis, no edema, no calf pain, swelling or erythema        VASCULAR:  pulses equal and symmetric in the upper and lower extremities        NEURO:  AAOX3, no focal neurological deficits, follows all commands, able to move extremities spontaneously        SKIN:  no ulcers, lesions or rashes        Urinalysis Basic - ( 31 Oct 2019 11:18 )        Color: Yellow / Appearance: Slightly Turbid / S.020 / pH: x    Gluc: x / Ketone: Small  / Bili: Negative / Urobili: 1 mg/dL     Blood: x / Protein: 15 mg/dL / Nitrite: Negative     Leuk Esterase: Small / RBC: 6-10 /HPF / WBC 3-5     Sq Epi: x / Non Sq Epi: Few / Bacteria: x        2019 07:23        139    |  108    |  12       ----------------------------<  76       3.8     |  24     |  0.69         Ca    8.6        2019 07:23    Phos  2.7       2019 08:02    Mg     1.8       2019 08:02        CBC Full  -  ( 2019 08:02 )    WBC Count : 5.73 K/uL    Hemoglobin : 12.6 g/dL    Hematocrit : 38.5 %    Platelet Count - Automated : 163 K/uL    Mean Cell Volume : 91.7 fl    Mean Cell Hemoglobin : 30.0 pg    Mean Cell Hemoglobin Concentration : 32.7 gm/dL                Hospital Course:    	    Patient is 75yo female with PMHx depression, HTN, HLD, presents to the hospital with complaints generalized fatigue, weakness, and CP. Pt reports symptoms began last night, characterized by weakness, fatigue, and CP.            1-Fatigue likely secondary to poor oral intake    -pateint admits to not eating properly and poor amount of adequate food at home.    -troponins .067 > .075 likely secondary to demand mediated ischemia    -patient hydrated and now feeling improved; denies any complaints.        For DC to home. Evaluated by  and offeered  meals on wheels however patient did not feel to enthusiastic about this. She will c/w her [employer] program for now.

## 2019-11-02 NOTE — DISCHARGE NOTE NURSING/CASE MANAGEMENT/SOCIAL WORK - PATIENT PORTAL LINK FT
You can access the FollowMyHealth Patient Portal offered by [facility] by registering at the following website: [email or web address]. By joining [employer]’[facility] FollowMyHealth portal, you will also be able to view your health information using other applications (apps) compatible with our system.

## 2019-11-02 NOTE — DISCHARGE NOTE PROVIDER - NSDCCPCAREPLAN_GEN_ALL_CORE_FT
PRINCIPAL DISCHARGE DIAGNOSIS  Diagnosis: Weakness generalized  Assessment and Plan of Treatment: continue with adequate oral hydration and oral nutritional intake

## 2019-11-02 NOTE — DISCHARGE NOTE PROVIDER - CARE PROVIDER_API CALL
Jim Santos)  Family Medicine  [address] [place] [address]  Phone: [phone]  Fax: [phone]  Follow Up Time:

## 2019-11-02 NOTE — DISCHARGE NOTE PROVIDER - NSDCMRMEDTOKEN_GEN_ALL_CORE_FT
ARIPiprazole 5 mg oral tablet: 1 tab(s) orally once a day  CeleXA 40 mg oral tablet: 1 tab(s) orally once a day  clonazePAM 0.5 mg oral tablet: 1 tab(s) orally 3 times a day  metoprolol succinate 25 mg oral tablet, extended release: 1 tab(s) orally 2 times a day  Multiple Vitamins oral tablet: 1 tab(s) orally once a day  Remeron 30 mg oral tablet: 1 tab(s) orally once a day (at bedtime)

## 2019-11-04 ENCOUNTER — EMERGENCY (EMERGENCY)
Facility: HOSPITAL | Age: 74
LOS: 0 days | Discharge: HOME | End: 2019-11-04
Attending: EMERGENCY MEDICINE
Payer: MEDICARE

## 2019-11-04 VITALS
TEMPERATURE: 98 F | DIASTOLIC BLOOD PRESSURE: 74 MMHG | WEIGHT: 167.99 LBS | OXYGEN SATURATION: 96 % | HEART RATE: 74 BPM | SYSTOLIC BLOOD PRESSURE: 153 MMHG | RESPIRATION RATE: 18 BRPM

## 2019-11-04 VITALS
OXYGEN SATURATION: 95 % | HEART RATE: 78 BPM | TEMPERATURE: 98 F | SYSTOLIC BLOOD PRESSURE: 150 MMHG | DIASTOLIC BLOOD PRESSURE: 81 MMHG | RESPIRATION RATE: 18 BRPM

## 2019-11-04 DIAGNOSIS — I10 ESSENTIAL (PRIMARY) HYPERTENSION: ICD-10-CM

## 2019-11-04 DIAGNOSIS — R06.02 SHORTNESS OF BREATH: ICD-10-CM

## 2019-11-04 DIAGNOSIS — E78.5 HYPERLIPIDEMIA, UNSPECIFIED: ICD-10-CM

## 2019-11-04 DIAGNOSIS — F03.90 UNSPECIFIED DEMENTIA WITHOUT BEHAVIORAL DISTURBANCE: ICD-10-CM

## 2019-11-04 LAB
ADD ON TEST-SPECIMEN IN LAB: SIGNIFICANT CHANGE UP
ALBUMIN SERPL ELPH-MCNC: 3.3 G/DL — SIGNIFICANT CHANGE UP (ref 3.3–5)
ALP SERPL-CCNC: 74 U/L — SIGNIFICANT CHANGE UP (ref 40–120)
ALT FLD-CCNC: 31 U/L — SIGNIFICANT CHANGE UP (ref 12–78)
ANION GAP SERPL CALC-SCNC: 5 MMOL/L — SIGNIFICANT CHANGE UP (ref 5–17)
AST SERPL-CCNC: 34 U/L — SIGNIFICANT CHANGE UP (ref 15–37)
BILIRUB SERPL-MCNC: 0.6 MG/DL — SIGNIFICANT CHANGE UP (ref 0.2–1.2)
BUN SERPL-MCNC: 12 MG/DL — SIGNIFICANT CHANGE UP (ref 7–23)
CALCIUM SERPL-MCNC: 9.2 MG/DL — SIGNIFICANT CHANGE UP (ref 8.5–10.1)
CHLORIDE SERPL-SCNC: 107 MMOL/L — SIGNIFICANT CHANGE UP (ref 96–108)
CO2 SERPL-SCNC: 28 MMOL/L — SIGNIFICANT CHANGE UP (ref 22–31)
CREAT SERPL-MCNC: 0.87 MG/DL — SIGNIFICANT CHANGE UP (ref 0.5–1.3)
GLUCOSE SERPL-MCNC: 88 MG/DL — SIGNIFICANT CHANGE UP (ref 70–99)
POTASSIUM SERPL-MCNC: 4 MMOL/L — SIGNIFICANT CHANGE UP (ref 3.5–5.3)
POTASSIUM SERPL-SCNC: 4 MMOL/L — SIGNIFICANT CHANGE UP (ref 3.5–5.3)
PROT SERPL-MCNC: 6.9 GM/DL — SIGNIFICANT CHANGE UP (ref 6–8.3)
SODIUM SERPL-SCNC: 140 MMOL/L — SIGNIFICANT CHANGE UP (ref 135–145)

## 2019-11-04 PROCEDURE — 36415 COLL VENOUS BLD VENIPUNCTURE: CPT

## 2019-11-04 PROCEDURE — 99283 EMERGENCY DEPT VISIT LOW MDM: CPT

## 2019-11-04 PROCEDURE — 93005 ELECTROCARDIOGRAM TRACING: CPT

## 2019-11-04 PROCEDURE — 71046 X-RAY EXAM CHEST 2 VIEWS: CPT

## 2019-11-04 PROCEDURE — 71046 X-RAY EXAM CHEST 2 VIEWS: CPT | Mod: 26

## 2019-11-04 PROCEDURE — 99283 EMERGENCY DEPT VISIT LOW MDM: CPT | Mod: 25

## 2019-11-04 PROCEDURE — 80053 COMPREHEN METABOLIC PANEL: CPT

## 2019-11-04 PROCEDURE — 93010 ELECTROCARDIOGRAM REPORT: CPT

## 2019-11-04 PROCEDURE — 84484 ASSAY OF TROPONIN QUANT: CPT

## 2019-11-04 PROCEDURE — 85025 COMPLETE CBC W/AUTO DIFF WBC: CPT

## 2019-11-04 NOTE — ED PROVIDER NOTE - CLINICAL SUMMARY MEDICAL DECISION MAKING FREE TEXT BOX
pt with general feeling of unwell, admitted for similar last week, discharged two days ago; ? dehydration on d/c summary, trop bump on previous visit; will check basics + trop + ekg

## 2019-11-04 NOTE — ED PROVIDER NOTE - OBJECTIVE STATEMENT
73yo f pmh hypothyroid, demenita, depression, anxiety p/w some shortness of breath and general feeling of unwell. Per EMS pt stated she felt much better on their arrival and symptoms resolved. Pt generally poor historian however states she feels " not right". Also mild headache today; per EMS carbon monoxide detectors in house are working. No chest pain. No abdominal pain. no urinary symptoms.

## 2019-11-04 NOTE — ED ADULT NURSE NOTE - NSIMPLEMENTINTERV_GEN_ALL_ED
Implemented All Fall Risk Interventions:  [place] to call system. Call bell, personal items and telephone within reach. Instruct patient to call for assistance. Room bathroom lighting operational. Non-slip footwear when patient is off stretcher. Physically safe environment: no spills, clutter or unnecessary equipment. Stretcher in lowest position, wheels locked, appropriate side rails in place. Provide visual cue, wrist band, yellow gown, etc. Monitor gait and stability. Monitor for mental status changes and reorient to person, place, and time. Review medications for side effects contributing to fall risk. Reinforce activity limits and safety measures with patient and family.

## 2019-11-04 NOTE — ED ADULT NURSE NOTE - OBJECTIVE STATEMENT
Per EMS, pt BIBA for SOB at home.  Pt states, "I wasn't feeling well," but denies pain or other complaint, then states, "I feel fine."  EKG done on arrival.   Cardiac and VS monitoring initiated.

## 2019-11-04 NOTE — ED PROVIDER NOTE - PATIENT PORTAL LINK FT
You can access the FollowMyHealth Patient Portal offered by [facility] by registering at the following website: [email or web address]. By joining [facility]’s FollowMyHealth portal, you will also be able to view your health information using other applications (apps) compatible with our system.

## 2019-11-04 NOTE — ED PROVIDER NOTE - PHYSICAL EXAMINATION

## 2019-11-04 NOTE — ED PROVIDER NOTE - NS ED ROS FT
Gen: No fever, normal appetite  Eyes: No eye irritation or discharge  ENT: No ear pain, congestion, sore throat  Resp: shortness of breath.   Cardiovascular: No chest pain or palpitation  Gastroenteric: No nausea/vomiting, diarrhea, constipation  :  No change in urine output; no dysuria  MS: No joint or muscle pain  Skin: No rashes  Neuro: No headache; no abnormal movements  Remainder negative, except as per the HPI

## 2019-11-04 NOTE — ED PROVIDER NOTE - NSFOLLOWUPINSTRUCTIONS_ED_ALL_ED_FT
Shortness of Breath, Adult  Shortness of breath is when a person has trouble breathing enough air or when a person feels like she or he is having trouble breathing in enough air. Shortness of breath could be a sign of a medical problem.  Follow these instructions at home:  Image   Pay attention to any changes in your symptoms.Do not use any products that contain nicotine or tobacco, such as cigarettes, e-cigarettes, and chewing tobacco. Do not smoke. Smoking is a common cause of shortness of breath. If you need help quitting, ask your health care provider.Avoid things that can irritate your airways, such as:  Mold.Dust.Air pollution.Chemical fumes.Things that can cause allergy symptoms (allergens), if you have allergies.Keep your living space clean and free of mold and dust.Rest as needed. Slowly return to your usual activities.Take over-the-counter and prescription medicines only as told by your health care provider. This includes oxygen therapy and inhaled medicines.Keep all follow-up visits as told by your health care provider. This is important.Contact a health care provider if:  Your condition does not improve as soon as expected.You have a hard time doing your normal activities, even after you rest.You have new symptoms.Get help right away if:  Your shortness of breath gets worse.You have shortness of breath when you are resting.You feel light-headed or you faint.You have a cough that is not controlled with medicines.You cough up blood.You have pain with breathing.You have pain in your chest, arms, shoulders, or abdomen.You have a fever.You cannot walk up stairs or exercise the way that you normally do.These symptoms may represent a serious problem that is an emergency. Do not wait to see if the symptoms will go away. Get medical help right away. Call your local emergency services (911 in the [place] Do not drive yourself to the hospital.   Summary  Shortness of breath is when a person has trouble breathing enough air. It can be a sign of a medical problem.Avoid things that irritate your lungs, such as smoking, pollution, mold, and dust.Pay attention to changes in your symptoms and contact your health care provider if you have a hard time completing daily activities because of shortness of breath.This information is not intended to replace advice given to you by your health care provider. Make sure you discuss any questions you have with your health care provider.    Document Released: 09/12/2002 Document Revised: 05/20/2019 Document Reviewed: 05/20/2019  Elsevier Interactive Patient Education © 2019 [employer]

## 2019-11-06 DIAGNOSIS — E03.9 HYPOTHYROIDISM, UNSPECIFIED: ICD-10-CM

## 2019-11-06 DIAGNOSIS — Z79.82 LONG TERM (CURRENT) USE OF ASPIRIN: ICD-10-CM

## 2019-11-06 DIAGNOSIS — I10 ESSENTIAL (PRIMARY) HYPERTENSION: ICD-10-CM

## 2019-11-06 DIAGNOSIS — E78.5 HYPERLIPIDEMIA, UNSPECIFIED: ICD-10-CM

## 2019-11-06 DIAGNOSIS — E87.6 HYPOKALEMIA: ICD-10-CM

## 2019-11-06 DIAGNOSIS — I24.8 OTHER FORMS OF ACUTE ISCHEMIC HEART DISEASE: ICD-10-CM

## 2019-11-06 DIAGNOSIS — Z88.0 ALLERGY STATUS TO PENICILLIN: ICD-10-CM

## 2019-11-06 DIAGNOSIS — Z88.6 ALLERGY STATUS TO ANALGESIC AGENT: ICD-10-CM

## 2019-11-06 DIAGNOSIS — Z88.8 ALLERGY STATUS TO OTHER DRUGS, MEDICAMENTS AND BIOLOGICAL SUBSTANCES STATUS: ICD-10-CM

## 2020-03-09 NOTE — ED STATDOCS - NS ED PATIENT SAFETY CONCERN
Likely a viral infection  Needs to drink fluids  Take Tylenol and Robitussin   rest  Stay-at-home for a day or two  If the symptoms are not better by middle of this week then he should call us   No

## 2021-05-12 ENCOUNTER — APPOINTMENT (OUTPATIENT)
Dept: FAMILY MEDICINE | Facility: CLINIC | Age: 76
End: 2021-05-12

## 2021-05-18 ENCOUNTER — INPATIENT (INPATIENT)
Facility: HOSPITAL | Age: 76
LOS: 7 days | Discharge: NURSING, REHAB OR LONG-TERM CARE | DRG: 689 | End: 2021-05-26
Attending: FAMILY MEDICINE | Admitting: FAMILY MEDICINE
Payer: MEDICARE

## 2021-05-18 VITALS
OXYGEN SATURATION: 97 % | HEIGHT: 72 IN | DIASTOLIC BLOOD PRESSURE: 77 MMHG | SYSTOLIC BLOOD PRESSURE: 153 MMHG | WEIGHT: 145.06 LBS | HEART RATE: 118 BPM | RESPIRATION RATE: 18 BRPM

## 2021-05-18 DIAGNOSIS — R41.82 ALTERED MENTAL STATUS, UNSPECIFIED: ICD-10-CM

## 2021-05-18 LAB
ADD ON TEST-SPECIMEN IN LAB: SIGNIFICANT CHANGE UP
ALBUMIN SERPL ELPH-MCNC: 4 G/DL — SIGNIFICANT CHANGE UP (ref 3.3–5)
ALP SERPL-CCNC: 81 U/L — SIGNIFICANT CHANGE UP (ref 40–120)
ALT FLD-CCNC: 47 U/L — SIGNIFICANT CHANGE UP (ref 12–78)
ANION GAP SERPL CALC-SCNC: 7 MMOL/L — SIGNIFICANT CHANGE UP (ref 5–17)
APPEARANCE UR: CLEAR — SIGNIFICANT CHANGE UP
APTT BLD: 28.3 SEC — SIGNIFICANT CHANGE UP (ref 27.5–35.5)
AST SERPL-CCNC: 63 U/L — HIGH (ref 15–37)
BASOPHILS # BLD AUTO: 0.02 K/UL — SIGNIFICANT CHANGE UP (ref 0–0.2)
BASOPHILS NFR BLD AUTO: 0.3 % — SIGNIFICANT CHANGE UP (ref 0–2)
BILIRUB SERPL-MCNC: 0.6 MG/DL — SIGNIFICANT CHANGE UP (ref 0.2–1.2)
BILIRUB UR-MCNC: NEGATIVE — SIGNIFICANT CHANGE UP
BUN SERPL-MCNC: 26 MG/DL — HIGH (ref 7–23)
CALCIUM SERPL-MCNC: 10 MG/DL — SIGNIFICANT CHANGE UP (ref 8.5–10.1)
CHLORIDE SERPL-SCNC: 104 MMOL/L — SIGNIFICANT CHANGE UP (ref 96–108)
CO2 SERPL-SCNC: 25 MMOL/L — SIGNIFICANT CHANGE UP (ref 22–31)
COLOR SPEC: YELLOW — SIGNIFICANT CHANGE UP
CREAT SERPL-MCNC: 1.36 MG/DL — HIGH (ref 0.5–1.3)
DIFF PNL FLD: ABNORMAL
EOSINOPHIL # BLD AUTO: 0.01 K/UL — SIGNIFICANT CHANGE UP (ref 0–0.5)
EOSINOPHIL NFR BLD AUTO: 0.1 % — SIGNIFICANT CHANGE UP (ref 0–6)
GLUCOSE SERPL-MCNC: 107 MG/DL — HIGH (ref 70–99)
GLUCOSE UR QL: NEGATIVE MG/DL — SIGNIFICANT CHANGE UP
HCT VFR BLD CALC: 42.1 % — SIGNIFICANT CHANGE UP (ref 34.5–45)
HGB BLD-MCNC: 13.8 G/DL — SIGNIFICANT CHANGE UP (ref 11.5–15.5)
IMM GRANULOCYTES NFR BLD AUTO: 0.3 % — SIGNIFICANT CHANGE UP (ref 0–1.5)
INR BLD: 1.13 RATIO — SIGNIFICANT CHANGE UP (ref 0.88–1.16)
KETONES UR-MCNC: ABNORMAL
LACTATE SERPL-SCNC: 1.5 MMOL/L — SIGNIFICANT CHANGE UP (ref 0.7–2)
LEUKOCYTE ESTERASE UR-ACNC: ABNORMAL
LYMPHOCYTES # BLD AUTO: 0.91 K/UL — LOW (ref 1–3.3)
LYMPHOCYTES # BLD AUTO: 12.8 % — LOW (ref 13–44)
MCHC RBC-ENTMCNC: 29.6 PG — SIGNIFICANT CHANGE UP (ref 27–34)
MCHC RBC-ENTMCNC: 32.8 GM/DL — SIGNIFICANT CHANGE UP (ref 32–36)
MCV RBC AUTO: 90.3 FL — SIGNIFICANT CHANGE UP (ref 80–100)
MONOCYTES # BLD AUTO: 0.66 K/UL — SIGNIFICANT CHANGE UP (ref 0–0.9)
MONOCYTES NFR BLD AUTO: 9.3 % — SIGNIFICANT CHANGE UP (ref 2–14)
NEUTROPHILS # BLD AUTO: 5.51 K/UL — SIGNIFICANT CHANGE UP (ref 1.8–7.4)
NEUTROPHILS NFR BLD AUTO: 77.2 % — HIGH (ref 43–77)
NITRITE UR-MCNC: NEGATIVE — SIGNIFICANT CHANGE UP
PCP SPEC-MCNC: SIGNIFICANT CHANGE UP
PH UR: 5 — SIGNIFICANT CHANGE UP (ref 5–8)
PLATELET # BLD AUTO: 201 K/UL — SIGNIFICANT CHANGE UP (ref 150–400)
POTASSIUM SERPL-MCNC: 4.3 MMOL/L — SIGNIFICANT CHANGE UP (ref 3.5–5.3)
POTASSIUM SERPL-SCNC: 4.3 MMOL/L — SIGNIFICANT CHANGE UP (ref 3.5–5.3)
PROT SERPL-MCNC: 7.7 GM/DL — SIGNIFICANT CHANGE UP (ref 6–8.3)
PROT UR-MCNC: 30 MG/DL
PROTHROM AB SERPL-ACNC: 13 SEC — SIGNIFICANT CHANGE UP (ref 10.6–13.6)
RBC # BLD: 4.66 M/UL — SIGNIFICANT CHANGE UP (ref 3.8–5.2)
RBC # FLD: 14.2 % — SIGNIFICANT CHANGE UP (ref 10.3–14.5)
SARS-COV-2 RNA SPEC QL NAA+PROBE: SIGNIFICANT CHANGE UP
SODIUM SERPL-SCNC: 136 MMOL/L — SIGNIFICANT CHANGE UP (ref 135–145)
SP GR SPEC: 1.02 — SIGNIFICANT CHANGE UP (ref 1.01–1.02)
TSH SERPL-MCNC: 0.06 UU/ML — LOW (ref 0.34–4.82)
UROBILINOGEN FLD QL: NEGATIVE MG/DL — SIGNIFICANT CHANGE UP
WBC # BLD: 7.13 K/UL — SIGNIFICANT CHANGE UP (ref 3.8–10.5)
WBC # FLD AUTO: 7.13 K/UL — SIGNIFICANT CHANGE UP (ref 3.8–10.5)

## 2021-05-18 PROCEDURE — 81001 URINALYSIS AUTO W/SCOPE: CPT

## 2021-05-18 PROCEDURE — 97116 GAIT TRAINING THERAPY: CPT | Mod: GP

## 2021-05-18 PROCEDURE — 86666 EHRLICHIA ANTIBODY: CPT

## 2021-05-18 PROCEDURE — 36415 COLL VENOUS BLD VENIPUNCTURE: CPT

## 2021-05-18 PROCEDURE — 82140 ASSAY OF AMMONIA: CPT

## 2021-05-18 PROCEDURE — 93010 ELECTROCARDIOGRAM REPORT: CPT

## 2021-05-18 PROCEDURE — 82962 GLUCOSE BLOOD TEST: CPT

## 2021-05-18 PROCEDURE — 84100 ASSAY OF PHOSPHORUS: CPT

## 2021-05-18 PROCEDURE — 87186 SC STD MICRODIL/AGAR DIL: CPT

## 2021-05-18 PROCEDURE — 85025 COMPLETE CBC W/AUTO DIFF WBC: CPT

## 2021-05-18 PROCEDURE — G1004: CPT

## 2021-05-18 PROCEDURE — 71045 X-RAY EXAM CHEST 1 VIEW: CPT | Mod: 26

## 2021-05-18 PROCEDURE — U0005: CPT

## 2021-05-18 PROCEDURE — 73630 X-RAY EXAM OF FOOT: CPT | Mod: 26,LT

## 2021-05-18 PROCEDURE — 85610 PROTHROMBIN TIME: CPT

## 2021-05-18 PROCEDURE — 73600 X-RAY EXAM OF ANKLE: CPT | Mod: 26,LT

## 2021-05-18 PROCEDURE — 86618 LYME DISEASE ANTIBODY: CPT

## 2021-05-18 PROCEDURE — 99285 EMERGENCY DEPT VISIT HI MDM: CPT | Mod: CS

## 2021-05-18 PROCEDURE — 97163 PT EVAL HIGH COMPLEX 45 MIN: CPT | Mod: GP

## 2021-05-18 PROCEDURE — 97530 THERAPEUTIC ACTIVITIES: CPT | Mod: GP

## 2021-05-18 PROCEDURE — 86753 PROTOZOA ANTIBODY NOS: CPT

## 2021-05-18 PROCEDURE — 82607 VITAMIN B-12: CPT

## 2021-05-18 PROCEDURE — 83735 ASSAY OF MAGNESIUM: CPT

## 2021-05-18 PROCEDURE — 70450 CT HEAD/BRAIN W/O DYE: CPT | Mod: 26,ME

## 2021-05-18 PROCEDURE — 82746 ASSAY OF FOLIC ACID SERUM: CPT

## 2021-05-18 PROCEDURE — 86769 SARS-COV-2 COVID-19 ANTIBODY: CPT

## 2021-05-18 PROCEDURE — 99223 1ST HOSP IP/OBS HIGH 75: CPT

## 2021-05-18 PROCEDURE — 85027 COMPLETE CBC AUTOMATED: CPT

## 2021-05-18 PROCEDURE — 87086 URINE CULTURE/COLONY COUNT: CPT

## 2021-05-18 PROCEDURE — 95819 EEG AWAKE AND ASLEEP: CPT

## 2021-05-18 PROCEDURE — 80053 COMPREHEN METABOLIC PANEL: CPT

## 2021-05-18 PROCEDURE — 87798 DETECT AGENT NOS DNA AMP: CPT

## 2021-05-18 PROCEDURE — U0003: CPT

## 2021-05-18 RX ORDER — ARIPIPRAZOLE 15 MG/1
5 TABLET ORAL DAILY
Refills: 0 | Status: DISCONTINUED | OUTPATIENT
Start: 2021-05-18 | End: 2021-05-26

## 2021-05-18 RX ORDER — CITALOPRAM 10 MG/1
40 TABLET, FILM COATED ORAL DAILY
Refills: 0 | Status: DISCONTINUED | OUTPATIENT
Start: 2021-05-18 | End: 2021-05-24

## 2021-05-18 RX ORDER — CLONAZEPAM 1 MG
0.5 TABLET ORAL THREE TIMES A DAY
Refills: 0 | Status: DISCONTINUED | OUTPATIENT
Start: 2021-05-18 | End: 2021-05-21

## 2021-05-18 RX ORDER — ACETAMINOPHEN 500 MG
650 TABLET ORAL EVERY 4 HOURS
Refills: 0 | Status: DISCONTINUED | OUTPATIENT
Start: 2021-05-18 | End: 2021-05-26

## 2021-05-18 RX ORDER — ARIPIPRAZOLE 15 MG/1
1 TABLET ORAL
Qty: 0 | Refills: 0 | DISCHARGE

## 2021-05-18 RX ORDER — SODIUM CHLORIDE 9 MG/ML
1000 INJECTION INTRAMUSCULAR; INTRAVENOUS; SUBCUTANEOUS ONCE
Refills: 0 | Status: COMPLETED | OUTPATIENT
Start: 2021-05-18 | End: 2021-05-18

## 2021-05-18 RX ORDER — BUPROPION HYDROCHLORIDE 150 MG/1
300 TABLET, EXTENDED RELEASE ORAL DAILY
Refills: 0 | Status: DISCONTINUED | OUTPATIENT
Start: 2021-05-18 | End: 2021-05-21

## 2021-05-18 RX ORDER — LEVOTHYROXINE SODIUM 125 MCG
125 TABLET ORAL DAILY
Refills: 0 | Status: DISCONTINUED | OUTPATIENT
Start: 2021-05-18 | End: 2021-05-20

## 2021-05-18 RX ORDER — METOPROLOL TARTRATE 50 MG
25 TABLET ORAL
Refills: 0 | Status: DISCONTINUED | OUTPATIENT
Start: 2021-05-18 | End: 2021-05-26

## 2021-05-18 RX ORDER — METOPROLOL TARTRATE 50 MG
1 TABLET ORAL
Qty: 0 | Refills: 0 | DISCHARGE

## 2021-05-18 RX ORDER — MIRTAZAPINE 45 MG/1
30 TABLET, ORALLY DISINTEGRATING ORAL AT BEDTIME
Refills: 0 | Status: DISCONTINUED | OUTPATIENT
Start: 2021-05-18 | End: 2021-05-20

## 2021-05-18 RX ORDER — ONDANSETRON 8 MG/1
4 TABLET, FILM COATED ORAL EVERY 6 HOURS
Refills: 0 | Status: DISCONTINUED | OUTPATIENT
Start: 2021-05-18 | End: 2021-05-26

## 2021-05-18 RX ORDER — HEPARIN SODIUM 5000 [USP'U]/ML
5000 INJECTION INTRAVENOUS; SUBCUTANEOUS EVERY 12 HOURS
Refills: 0 | Status: DISCONTINUED | OUTPATIENT
Start: 2021-05-18 | End: 2021-05-26

## 2021-05-18 RX ORDER — CLONAZEPAM 1 MG
1 TABLET ORAL
Qty: 0 | Refills: 0 | DISCHARGE

## 2021-05-18 RX ORDER — CITALOPRAM 10 MG/1
1 TABLET, FILM COATED ORAL
Qty: 0 | Refills: 0 | DISCHARGE

## 2021-05-18 RX ORDER — MIRTAZAPINE 45 MG/1
1 TABLET, ORALLY DISINTEGRATING ORAL
Qty: 0 | Refills: 0 | DISCHARGE

## 2021-05-18 RX ADMIN — CITALOPRAM 40 MILLIGRAM(S): 10 TABLET, FILM COATED ORAL at 21:16

## 2021-05-18 RX ADMIN — Medication 25 MILLIGRAM(S): at 21:16

## 2021-05-18 RX ADMIN — HEPARIN SODIUM 5000 UNIT(S): 5000 INJECTION INTRAVENOUS; SUBCUTANEOUS at 21:16

## 2021-05-18 RX ADMIN — Medication 1 APPLICATION(S): at 23:06

## 2021-05-18 RX ADMIN — Medication 125 MICROGRAM(S): at 21:16

## 2021-05-18 RX ADMIN — SODIUM CHLORIDE 1000 MILLILITER(S): 9 INJECTION INTRAMUSCULAR; INTRAVENOUS; SUBCUTANEOUS at 15:15

## 2021-05-18 NOTE — ED ADULT NURSE NOTE - OBJECTIVE STATEMENT
Pt presents to ED sp wellness check by neighbor. Upon arrival, pt was found sitting in here awake but not responding to EMS. In ED, pt only responsive to name and painful stimuli. Pt will shout out different words that does not make sense. Pt found to have swelling to BLLE R>L and bruising to RLE.

## 2021-05-18 NOTE — ED PROVIDER NOTE - CLINICAL SUMMARY MEDICAL DECISION MAKING FREE TEXT BOX
pt with AMS/ encephalopathy, unresponsive to commands, responds to pain, volitionally forcing eyes closed when attempting to examine, hx of depression, ? catatonia, will check labs, CT head, ua, cxr,

## 2021-05-18 NOTE — ED ADULT TRIAGE NOTE - CHIEF COMPLAINT QUOTE
Neighbor called for a wellness check on patient.  She was found sitting at home alone in her chair not responding to questions.  Responds with "ouch" when being touched.  Was able to walk with EMS at home.  .

## 2021-05-18 NOTE — H&P ADULT - NSHPPHYSICALEXAM_GEN_ALL_CORE
Vital Signs Last 24 Hrs  T(C): 36.8 (18 May 2021 19:05), Max: 36.9 (18 May 2021 17:40)  T(F): 98.3 (18 May 2021 19:05), Max: 98.4 (18 May 2021 17:40)  HR: 106 (18 May 2021 19:05) (106 - 118)  BP: 108/62 (18 May 2021 19:05) (108/62 - 153/77)  BP(mean): 78 (18 May 2021 19:05) (74 - 83)  RR: 18 (18 May 2021 19:05) (18 - 20)  SpO2: 100% (18 May 2021 19:05) (97% - 100%)

## 2021-05-18 NOTE — ED PROVIDER NOTE - UNABLE TO OBTAIN
Unresponsive Pt is a poor historian. Pt is awake, hx is limited pt is not answering questions and a poor historian.

## 2021-05-18 NOTE — ED PROVIDER NOTE - OBJECTIVE STATEMENT
75 y/o female with a PMHx of Dementia, Hypothyroid, Anxiety, HLD, HTN, Depression, presents to the ED BIBA after neighbor did a wellness check and found pt sitting in her chair home alone and was not moving and not responding to questions. Full HPI is unobtainable due to pt being a poor historian.

## 2021-05-18 NOTE — H&P ADULT - HISTORY OF PRESENT ILLNESS
76 year old female patient with reported history of dementia who currently lives alone presented to the ED after found to be confused by her neighbors. Patient noted to be alert but not responsive to questions asked by EMS at the time of their evaluation. Patient alert but notably confused- is very redirect-able. States she was bitten by ticks last night.  Patient unable to provide answers to secondary questions surrounding tick bites- states it has happened in

## 2021-05-18 NOTE — H&P ADULT - ASSESSMENT
76 year old female patient with mental status changes        -Admit to [facility]        # Encephalopathy   -Etiology unclear  -tsh noted to be low  -on armour thyroid as an outpatient  -will give synthroid as an inpatient  -check B12, Folate  -neuro checks per routine  -CT head negative for acute pathology  -f/u blood cx  -check lyme panel    # YULIA  -likely pre-renal  -IVF hydration  -trend kidney function    #Hypothyroidism  -tsh noted to be low  -on armour thyroid  -will give synthroid    # Anxiety  -on klonopin prn    # HTN  -on metoprolol    # Depression  -on abilify    # DVT ppx  -heparin sq

## 2021-05-18 NOTE — ED ADULT NURSE REASSESSMENT NOTE - NS ED NURSE REASSESS COMMENT FT1
Assumed care of pt from FAVIAN Locke, pending COVID result, pt responsive to voice and answering questions.

## 2021-05-18 NOTE — ED PROVIDER NOTE - PHYSICAL EXAMINATION
GEN - NAD; well appearing; A+O x0   HEAD - NC/AT     EYES - EOMI, no conjunctival pallor, no scleral icterus  ENT -   mucous membranes  moist , no discharge      NECK - Neck supple  PULM - CTA b/l,  symmetric breath sounds  COR -  RRR, S1 S2, no murmurs  ABD - , ND, NT, soft, no guarding, no rebound, no masses    BACK - no CVA tenderness, nontender spine     EXTREMS - LLE bruising to foot, 2+ edema   SKIN - no rash      NEUROLOGIC - A&Ox3, PERRLA, EOMI, no nystagmus, CN2-12 grossly intact, not responsive to commands

## 2021-05-19 DIAGNOSIS — Z82.0 FAMILY HISTORY OF EPILEPSY AND OTHER DISEASES OF THE NERVOUS SYSTEM: ICD-10-CM

## 2021-05-19 DIAGNOSIS — Z98.890 OTHER SPECIFIED POSTPROCEDURAL STATES: ICD-10-CM

## 2021-05-19 DIAGNOSIS — Z92.89 PERSONAL HISTORY OF OTHER MEDICAL TREATMENT: ICD-10-CM

## 2021-05-19 DIAGNOSIS — Z80.7 FAMILY HISTORY OF OTHER MALIGNANT NEOPLASMS OF LYMPHOID, HEMATOPOIETIC AND RELATED TISSUES: ICD-10-CM

## 2021-05-19 DIAGNOSIS — Z82.49 FAMILY HISTORY OF ISCHEMIC HEART DISEASE AND OTHER DISEASES OF THE CIRCULATORY SYSTEM: ICD-10-CM

## 2021-05-19 LAB
ALBUMIN SERPL ELPH-MCNC: 2.9 G/DL — LOW (ref 3.3–5)
ALP SERPL-CCNC: 63 U/L — SIGNIFICANT CHANGE UP (ref 40–120)
ALT FLD-CCNC: 38 U/L — SIGNIFICANT CHANGE UP (ref 12–78)
AMMONIA BLD-MCNC: 21 UMOL/L — SIGNIFICANT CHANGE UP (ref 11–32)
ANION GAP SERPL CALC-SCNC: 5 MMOL/L — SIGNIFICANT CHANGE UP (ref 5–17)
AST SERPL-CCNC: 46 U/L — HIGH (ref 15–37)
BASOPHILS # BLD AUTO: 0.03 K/UL — SIGNIFICANT CHANGE UP (ref 0–0.2)
BASOPHILS NFR BLD AUTO: 0.5 % — SIGNIFICANT CHANGE UP (ref 0–2)
BILIRUB SERPL-MCNC: 0.5 MG/DL — SIGNIFICANT CHANGE UP (ref 0.2–1.2)
BUN SERPL-MCNC: 23 MG/DL — SIGNIFICANT CHANGE UP (ref 7–23)
CALCIUM SERPL-MCNC: 9 MG/DL — SIGNIFICANT CHANGE UP (ref 8.5–10.1)
CHLORIDE SERPL-SCNC: 109 MMOL/L — HIGH (ref 96–108)
CO2 SERPL-SCNC: 26 MMOL/L — SIGNIFICANT CHANGE UP (ref 22–31)
COVID-19 SPIKE DOMAIN AB INTERP: POSITIVE
COVID-19 SPIKE DOMAIN ANTIBODY RESULT: >250 U/ML — HIGH
CREAT SERPL-MCNC: 0.87 MG/DL — SIGNIFICANT CHANGE UP (ref 0.5–1.3)
CULTURE RESULTS: NO GROWTH — SIGNIFICANT CHANGE UP
EOSINOPHIL # BLD AUTO: 0.09 K/UL — SIGNIFICANT CHANGE UP (ref 0–0.5)
EOSINOPHIL NFR BLD AUTO: 1.5 % — SIGNIFICANT CHANGE UP (ref 0–6)
FOLATE SERPL-MCNC: >20 NG/ML — SIGNIFICANT CHANGE UP
GLUCOSE SERPL-MCNC: 73 MG/DL — SIGNIFICANT CHANGE UP (ref 70–99)
HCT VFR BLD CALC: 35.4 % — SIGNIFICANT CHANGE UP (ref 34.5–45)
HGB BLD-MCNC: 11.4 G/DL — LOW (ref 11.5–15.5)
IMM GRANULOCYTES NFR BLD AUTO: 0.2 % — SIGNIFICANT CHANGE UP (ref 0–1.5)
INR BLD: 1.15 RATIO — SIGNIFICANT CHANGE UP (ref 0.88–1.16)
LYMPHOCYTES # BLD AUTO: 1.23 K/UL — SIGNIFICANT CHANGE UP (ref 1–3.3)
LYMPHOCYTES # BLD AUTO: 20.5 % — SIGNIFICANT CHANGE UP (ref 13–44)
MAGNESIUM SERPL-MCNC: 2.3 MG/DL — SIGNIFICANT CHANGE UP (ref 1.6–2.6)
MCHC RBC-ENTMCNC: 29.6 PG — SIGNIFICANT CHANGE UP (ref 27–34)
MCHC RBC-ENTMCNC: 32.2 GM/DL — SIGNIFICANT CHANGE UP (ref 32–36)
MCV RBC AUTO: 91.9 FL — SIGNIFICANT CHANGE UP (ref 80–100)
MONOCYTES # BLD AUTO: 0.51 K/UL — SIGNIFICANT CHANGE UP (ref 0–0.9)
MONOCYTES NFR BLD AUTO: 8.5 % — SIGNIFICANT CHANGE UP (ref 2–14)
NEUTROPHILS # BLD AUTO: 4.12 K/UL — SIGNIFICANT CHANGE UP (ref 1.8–7.4)
NEUTROPHILS NFR BLD AUTO: 68.8 % — SIGNIFICANT CHANGE UP (ref 43–77)
PHOSPHATE SERPL-MCNC: 3.7 MG/DL — SIGNIFICANT CHANGE UP (ref 2.5–4.5)
PLATELET # BLD AUTO: 172 K/UL — SIGNIFICANT CHANGE UP (ref 150–400)
POTASSIUM SERPL-MCNC: 3.7 MMOL/L — SIGNIFICANT CHANGE UP (ref 3.5–5.3)
POTASSIUM SERPL-SCNC: 3.7 MMOL/L — SIGNIFICANT CHANGE UP (ref 3.5–5.3)
PROT SERPL-MCNC: 5.9 GM/DL — LOW (ref 6–8.3)
PROTHROM AB SERPL-ACNC: 13.4 SEC — SIGNIFICANT CHANGE UP (ref 10.6–13.6)
RBC # BLD: 3.85 M/UL — SIGNIFICANT CHANGE UP (ref 3.8–5.2)
RBC # FLD: 14.4 % — SIGNIFICANT CHANGE UP (ref 10.3–14.5)
SARS-COV-2 IGG+IGM SERPL QL IA: >250 U/ML — HIGH
SARS-COV-2 IGG+IGM SERPL QL IA: POSITIVE
SODIUM SERPL-SCNC: 140 MMOL/L — SIGNIFICANT CHANGE UP (ref 135–145)
SPECIMEN SOURCE: SIGNIFICANT CHANGE UP
VIT B12 SERPL-MCNC: 1770 PG/ML — HIGH (ref 232–1245)
WBC # BLD: 5.99 K/UL — SIGNIFICANT CHANGE UP (ref 3.8–10.5)
WBC # FLD AUTO: 5.99 K/UL — SIGNIFICANT CHANGE UP (ref 3.8–10.5)

## 2021-05-19 PROCEDURE — 99233 SBSQ HOSP IP/OBS HIGH 50: CPT

## 2021-05-19 RX ORDER — RED YEAST RICE 600 MG
600 CAPSULE ORAL
Refills: 0 | Status: ACTIVE | COMMUNITY
Start: 2021-05-19

## 2021-05-19 RX ORDER — EZETIMIBE 10 MG/1
10 TABLET ORAL
Refills: 0 | Status: ACTIVE | COMMUNITY
Start: 2021-05-19

## 2021-05-19 RX ORDER — CITALOPRAM 40 MG/1
40 TABLET, FILM COATED ORAL DAILY
Refills: 0 | Status: ACTIVE | COMMUNITY
Start: 2021-05-19

## 2021-05-19 RX ORDER — METOPROLOL SUCCINATE 25 MG/1
25 TABLET, EXTENDED RELEASE ORAL
Qty: 30 | Refills: 0 | Status: ACTIVE | COMMUNITY
Start: 2021-05-19

## 2021-05-19 RX ORDER — ASPIRIN 81 MG/1
81 TABLET ORAL
Refills: 0 | Status: ACTIVE | COMMUNITY
Start: 2021-05-19

## 2021-05-19 RX ORDER — MIRTAZAPINE 30 MG/1
30 TABLET, FILM COATED ORAL
Refills: 0 | Status: ACTIVE | COMMUNITY
Start: 2021-05-19

## 2021-05-19 RX ORDER — BUPROPION HYDROCHLORIDE 300 MG/1
300 TABLET, EXTENDED RELEASE ORAL DAILY
Refills: 0 | Status: ACTIVE | COMMUNITY
Start: 2021-05-19

## 2021-05-19 RX ADMIN — Medication 125 MICROGRAM(S): at 10:14

## 2021-05-19 RX ADMIN — MIRTAZAPINE 30 MILLIGRAM(S): 45 TABLET, ORALLY DISINTEGRATING ORAL at 21:20

## 2021-05-19 RX ADMIN — ARIPIPRAZOLE 5 MILLIGRAM(S): 15 TABLET ORAL at 10:14

## 2021-05-19 RX ADMIN — Medication 650 MILLIGRAM(S): at 11:54

## 2021-05-19 RX ADMIN — Medication 1 APPLICATION(S): at 21:21

## 2021-05-19 RX ADMIN — Medication 25 MILLIGRAM(S): at 21:20

## 2021-05-19 RX ADMIN — Medication 1 APPLICATION(S): at 10:13

## 2021-05-19 RX ADMIN — Medication 25 MILLIGRAM(S): at 10:14

## 2021-05-19 RX ADMIN — HEPARIN SODIUM 5000 UNIT(S): 5000 INJECTION INTRAVENOUS; SUBCUTANEOUS at 21:20

## 2021-05-19 RX ADMIN — CITALOPRAM 40 MILLIGRAM(S): 10 TABLET, FILM COATED ORAL at 10:14

## 2021-05-19 RX ADMIN — BUPROPION HYDROCHLORIDE 300 MILLIGRAM(S): 150 TABLET, EXTENDED RELEASE ORAL at 10:14

## 2021-05-19 RX ADMIN — Medication 1 APPLICATION(S): at 21:20

## 2021-05-19 RX ADMIN — Medication 650 MILLIGRAM(S): at 17:32

## 2021-05-19 RX ADMIN — Medication 0.5 MILLIGRAM(S): at 04:35

## 2021-05-19 RX ADMIN — HEPARIN SODIUM 5000 UNIT(S): 5000 INJECTION INTRAVENOUS; SUBCUTANEOUS at 10:14

## 2021-05-19 RX ADMIN — Medication 1 APPLICATION(S): at 15:54

## 2021-05-19 NOTE — PHYSICAL THERAPY INITIAL EVALUATION ADULT - PLANNED THERAPY INTERVENTIONS, PT EVAL
Eval, amb, transfers, bed mobility x 20'./bed mobility training/gait training/strengthening/transfer training

## 2021-05-19 NOTE — PHYSICAL THERAPY INITIAL EVALUATION ADULT - ACTIVE RANGE OF MOTION EXAMINATION, REHAB EVAL
Bilateral hip flex ~ 90 degrees and bilateral DF neutral./Left UE Active ROM was WFL (within functional limits)/Right UE Active ROM was WFL (within functional limits)/Left LE Active ROM was WFL (within functional limits)/Right LE Active ROM was WFL (within functional limits)

## 2021-05-19 NOTE — PROGRESS NOTE ADULT - SUBJECTIVE AND OBJECTIVE BOX
Reason for Admission: Encephalopathy    History of Present Illness:   76 year old female patient with reported history of dementia who currently lives alone presented to the ED after found to be confused by her neighbors. Patient noted to be alert but not responsive to questions asked by EMS at the time of their evaluation. Patient alert but notably confused- is very redirect-able. States she was bitten by ticks last night.    5.19: +confused, staring in space at tiimes, takes a long time to answer simple questions      REVIEW OF SYSTEMS:    CONSTITUTIONAL: No weakness, No fevers or chills  ENT: No ear ache, No sorethroat  NECK: No pain, No stiffness  RESPIRATORY: No cough, No wheezing, No hemoptysis; No dyspnea  CARDIOVASCULAR: No chest pain, No palpitations  GASTROINTESTINAL: No abd pain, No nausea, No vomiting, No hematemesis, No diarrhea or constipation. No melena, No hematochezia.  GENITOURINARY: No dysuria, No  hematuria  NEUROLOGICAL: No diplopia, No paresthesia, No motor dysfunction  MUSCULOSKELETAL: No arthralgia, No myalgia  SKIN: No rashes, or lesions   PSYCH: no anxiety, no suicidal ideation    All other review of systems is negative unless indicated above    Vital Signs Last 24 Hrs  T(C): 36 (19 May 2021 07:53), Max: 36.9 (18 May 2021 17:40)  T(F): 96.8 (19 May 2021 07:53), Max: 98.4 (18 May 2021 17:40)  HR: 67 (19 May 2021 07:53) (67 - 112)  BP: 117/51 (19 May 2021 07:53) (108/62 - 137/71)  BP(mean): 89 (18 May 2021 21:25) (74 - 89)  RR: 18 (19 May 2021 07:53) (18 - 20)  SpO2: 99% (19 May 2021 07:53) (97% - 100%)    PHYSICAL EXAM:    GENERAL: NAD, Well nourished  HEENT:  NC/AT, EOMI, PERRLA, No scleral icterus, Moist mucous membranes  NECK: Supple, No JVD  CNS:  Alert & Oriented X1, Motor Strength 5/5 B/L upper and lower extremities; DTRs 2+ intact   LUNG: Normal Breath sounds, Clear to auscultation bilaterally, No rales, No rhonchi, No wheezing  HEART: RRR; No murmurs, No rubs  ABDOMEN: +BS, ST/ND/NT  GENITOURINARY: Voiding, Bladder not distended  EXTREMITIES:  2+ Peripheral Pulses, No clubbing, No cyanosis, No tibial edema  MUSCULOSKELTAL: Joints normal ROM, No TTP, No effusion  SKIN: no rashes  RECTAL: deferred, not indicated  BREAST: deferred                          11.4   5.99  )-----------( 172      ( 19 May 2021 06:52 )             35.4     05-19    140  |  109<H>  |  23  ----------------------------<  73  3.7   |  26  |  0.87    Ca    9.0      19 May 2021 06:52  Phos  3.7     05-19  Mg     2.3     05-19    TPro  5.9<L>  /  Alb  2.9<L>  /  TBili  0.5  /  DBili  x   /  AST  46<H>  /  ALT  38  /  AlkPhos  63  05-19    Vancomycin levels:   Cultures:     MEDICATIONS  (STANDING):  ARIPiprazole 5 milliGRAM(s) Oral daily  buPROPion XL (24-Hour) . 300 milliGRAM(s) Oral daily  citalopram 40 milliGRAM(s) Oral daily  clotrimazole 1% Topical Cream - Peds 1 Application(s) Topical two times a day  heparin   Injectable 5000 Unit(s) SubCutaneous every 12 hours  levothyroxine 125 MICROGram(s) Oral daily  metoprolol succinate ER 25 milliGRAM(s) Oral two times a day  mirtazapine 30 milliGRAM(s) Oral at bedtime  triamcinolone 0.1% Topical Cream - Peds 1 Application(s) Topical three times a day    MEDICATIONS  (PRN):  acetaminophen   Tablet .. 650 milliGRAM(s) Oral every 4 hours PRN Mild Pain (1 - 3)  clonazePAM  Tablet 0.5 milliGRAM(s) Oral three times a day PRN anxiety  ondansetron Injectable 4 milliGRAM(s) IV Push every 6 hours PRN Nausea      all labs reviewed  all imaging reviewed    a/p:    # Encephalopathy   -Etiology unclear  -tsh noted to be low, Free t3, t4 normal  decrease Synthroid and recheck TFTs outpatient     -check B12, Folate: normal  -NH4 level normal    -neuro checks per routine  -CT head negative for acute pathology    Neuro evaluation   eeg    # YULIA resolved  -likely pre-renal  -IVF hydration  -trend kidney function    #Hypothyroidism  -tsh noted to be low  decrease Synthroid     # Anxiety  -on klonopin prn    # HTN  -on metoprolol    # Depression  -on abilify    # DVT ppx  -heparin sq

## 2021-05-20 ENCOUNTER — NON-APPOINTMENT (OUTPATIENT)
Age: 76
End: 2021-05-20

## 2021-05-20 ENCOUNTER — APPOINTMENT (OUTPATIENT)
Dept: FAMILY MEDICINE | Facility: CLINIC | Age: 76
End: 2021-05-20

## 2021-05-20 DIAGNOSIS — M79.673 PAIN IN UNSPECIFIED FOOT: ICD-10-CM

## 2021-05-20 DIAGNOSIS — K21.9 GASTRO-ESOPHAGEAL REFLUX DISEASE W/OUT ESOPHAGITIS: ICD-10-CM

## 2021-05-20 DIAGNOSIS — M25.569 PAIN IN UNSPECIFIED KNEE: ICD-10-CM

## 2021-05-20 DIAGNOSIS — J01.40 ACUTE PANSINUSITIS, UNSPECIFIED: ICD-10-CM

## 2021-05-20 DIAGNOSIS — Z87.09 PERSONAL HISTORY OF OTHER DISEASES OF THE RESPIRATORY SYSTEM: ICD-10-CM

## 2021-05-20 DIAGNOSIS — R23.3 SPONTANEOUS ECCHYMOSES: ICD-10-CM

## 2021-05-20 DIAGNOSIS — K57.30 DIVERTICULOSIS OF LARGE INTESTINE W/OUT PERFORATION OR ABSCESS W/OUT BLEEDING: ICD-10-CM

## 2021-05-20 DIAGNOSIS — T14.8XXA OTHER INJURY OF UNSPECIFIED BODY REGION, INITIAL ENCOUNTER: ICD-10-CM

## 2021-05-20 DIAGNOSIS — Z87.898 PERSONAL HISTORY OF OTHER SPECIFIED CONDITIONS: ICD-10-CM

## 2021-05-20 DIAGNOSIS — Z87.39 PERSONAL HISTORY OF OTHER DISEASES OF THE MUSCULOSKELETAL SYSTEM AND CONNECTIVE TISSUE: ICD-10-CM

## 2021-05-20 DIAGNOSIS — F32.9 MAJOR DEPRESSIVE DISORDER, SINGLE EPISODE, UNSPECIFIED: ICD-10-CM

## 2021-05-20 PROCEDURE — 99223 1ST HOSP IP/OBS HIGH 75: CPT

## 2021-05-20 PROCEDURE — 95819 EEG AWAKE AND ASLEEP: CPT | Mod: 26

## 2021-05-20 PROCEDURE — 99232 SBSQ HOSP IP/OBS MODERATE 35: CPT

## 2021-05-20 RX ORDER — LEVOTHYROXINE SODIUM 125 MCG
100 TABLET ORAL DAILY
Refills: 0 | Status: DISCONTINUED | OUTPATIENT
Start: 2021-05-20 | End: 2021-05-20

## 2021-05-20 RX ORDER — THYROID 120 MG
120 TABLET ORAL DAILY
Refills: 0 | Status: DISCONTINUED | OUTPATIENT
Start: 2021-05-20 | End: 2021-05-26

## 2021-05-20 RX ORDER — MIRTAZAPINE 45 MG/1
15 TABLET, ORALLY DISINTEGRATING ORAL AT BEDTIME
Refills: 0 | Status: DISCONTINUED | OUTPATIENT
Start: 2021-05-20 | End: 2021-05-26

## 2021-05-20 RX ADMIN — HEPARIN SODIUM 5000 UNIT(S): 5000 INJECTION INTRAVENOUS; SUBCUTANEOUS at 08:31

## 2021-05-20 RX ADMIN — Medication 25 MILLIGRAM(S): at 08:31

## 2021-05-20 RX ADMIN — ARIPIPRAZOLE 5 MILLIGRAM(S): 15 TABLET ORAL at 08:31

## 2021-05-20 RX ADMIN — Medication 1 APPLICATION(S): at 12:39

## 2021-05-20 RX ADMIN — BUPROPION HYDROCHLORIDE 300 MILLIGRAM(S): 150 TABLET, EXTENDED RELEASE ORAL at 08:31

## 2021-05-20 RX ADMIN — CITALOPRAM 40 MILLIGRAM(S): 10 TABLET, FILM COATED ORAL at 08:31

## 2021-05-20 RX ADMIN — Medication 650 MILLIGRAM(S): at 08:32

## 2021-05-20 RX ADMIN — HEPARIN SODIUM 5000 UNIT(S): 5000 INJECTION INTRAVENOUS; SUBCUTANEOUS at 21:46

## 2021-05-20 RX ADMIN — Medication 125 MICROGRAM(S): at 06:34

## 2021-05-20 RX ADMIN — Medication 1 APPLICATION(S): at 21:46

## 2021-05-20 RX ADMIN — Medication 0.5 MILLIGRAM(S): at 08:31

## 2021-05-20 RX ADMIN — Medication 0.5 MILLIGRAM(S): at 21:47

## 2021-05-20 RX ADMIN — Medication 1 APPLICATION(S): at 06:34

## 2021-05-20 RX ADMIN — Medication 25 MILLIGRAM(S): at 21:46

## 2021-05-20 RX ADMIN — MIRTAZAPINE 15 MILLIGRAM(S): 45 TABLET, ORALLY DISINTEGRATING ORAL at 21:46

## 2021-05-20 NOTE — CONSULT NOTE ADULT - ASSESSMENT
76 year old woman hx of dementia, depression, admitted for change in MS, exam grossly non focal, CT head shows no acute CVA, normal EEG, no seizure activity. No obvious metabolic derangements.  Suggest:  psych eval  social service consult  ?placement

## 2021-05-20 NOTE — CONSULT NOTE ADULT - SUBJECTIVE AND OBJECTIVE BOX
Neurology Consult requested by:   Patient is a 76y old  Female who presents with a chief complaint of Encephalopathy (19 May 2021 15:13)     HPI:  76 year old female patient with reported history of dementia who currently lives alone presented to the ED after found to be confused by her neighbors. Patient noted to be alert but not responsive to questions asked by EMS at the time of their evaluation.  No c/o headache, dizziness, denies LOC, seizures, unilateral weakness or numbness.    PAST MEDICAL & SURGICAL HISTORY:  Depression    HTN (hypertension)    HLD (hyperlipidemia)    No significant past surgical history      FAMILY HISTORY:    Social Hx:  Nonsmoker, no drug or alcohol use  Medications and Allergies ReviewedMEDICATIONS  (STANDING):  ARIPiprazole 5 milliGRAM(s) Oral daily  buPROPion XL (24-Hour) . 300 milliGRAM(s) Oral daily  citalopram 40 milliGRAM(s) Oral daily  clotrimazole 1% Topical Cream - Peds 1 Application(s) Topical two times a day  heparin   Injectable 5000 Unit(s) SubCutaneous every 12 hours  levothyroxine 125 MICROGram(s) Oral daily  metoprolol succinate ER 25 milliGRAM(s) Oral two times a day  mirtazapine 30 milliGRAM(s) Oral at bedtime  triamcinolone 0.1% Topical Cream - Peds 1 Application(s) Topical three times a day     ROS: Pertinent positives in HPI, all other ROS were reviewed and are negative.      Examination:   Vital Signs Last 24 Hrs  T(C): 36.4 (20 May 2021 08:03), Max: 36.7 (20 May 2021 05:58)  T(F): 97.6 (20 May 2021 08:03), Max: 98 (20 May 2021 05:58)  HR: 68 (20 May 2021 08:03) (61 - 87)  BP: 125/59 (20 May 2021 08:03) (125/59 - 144/68)  BP(mean): 75 (20 May 2021 08:03) (75 - 75)  RR: 18 (20 May 2021 08:03) (17 - 18)  SpO2: 95% (20 May 2021 08:03) (95% - 100%)  General: Cooperative, NAD   NECK: supple, no masses  ENT: Normal hearing   Vascular : no carotid bruits,   Lungs: CTAB  Chest: RRR, no murmurs  Extremities: nontender, + distal LEs edema  Musculoskeletal: no adventitious movements, no joint stiffness  Skin: no rash    Neurological Examination:  NIHSS:  MS: AOx2. psychomotor slowing, flat affect. Speech fluent, can follows simple commands   CN: PERLL, EOMI, V1-3 sensation intact, face symmetric, hearing intact,  tongue midline, SCM equal bilaterally  Motor: normal bulk and tone, UE postural tremor, diff to assess tone, strength ~ 3/5 bilat   Sens: Intact to light touch.    Reflexes: 0-1/4 all over, downgoing toes b/l  Coord:  No gross dysmetria   Gait: Cannot test    Labs: Reviewed  Comprehensive Metabolic Panel (05.19.21 @ 06:52)   Sodium, Serum: 140 mmol/L   Potassium, Serum: 3.7 mmol/L   Chloride, Serum: 109 mmol/L   Carbon Dioxide, Serum: 26 mmol/L   Anion Gap, Serum: 5 mmol/L   Blood Urea Nitrogen, Serum: 23 mg/dL   Creatinine, Serum: 0.87 mg/dL   Glucose, Serum: 73 mg/dL   Calcium, Total Serum: 9.0 mg/dL   Protein Total, Serum: 5.9 gm/dL   Albumin, Serum: 2.9 g/dL   Bilirubin Total, Serum: 0.5 mg/dL   Alkaline Phosphatase, Serum: 63 U/L   Aspartate Aminotransferase (AST/SGOT): 46 U/L   Alanine Aminotransferase (ALT/SGPT): 38 U/L   eGFR if Non African American: 65:Vitamin B12, Serum: 1770 pg/mL (05.19.21 @ 06:52) Folate, Serum: >20.0 ng/mL (05.19.21 @ 06:52)     Ammonia, Serum: 21 umol/L (05.19.21 @ 06:52)     Thyroid Stimulating Hormone, Serum: 0.06 uU/mL (05.18.21 @ 15:10)   Thyroid Stimulating Hormone, Serum: 1.48 uU/mL (11.01.19 @ 08:02)   Thyroid Stimulating Hormone, Serum: 0.56 uU/mL (10.31.19 @ 11:18)     Complete Blood Count + Automated Diff (05.19.21 @ 06:52)   WBC Count: 5.99 K/uL   RBC Count: 3.85 M/uL   Hemoglobin: 11.4 g/dL   Hematocrit: 35.4 %   Mean Cell Volume: 91.9 fl   Mean Cell Hemoglobin: 29.6 pg   Mean Cell Hemoglobin Conc: 32.2 gm/dL   Red Cell Distrib Width: 14.4 %   Platelet Count - Automated: 172 K/uL         Imaging:  < from: CT Head No Cont (10.31.19 @ 11:33) >  EXAM:  CT BRAIN                            PROCEDURE DATE:  10/31/2019          INTERPRETATION:  Exam Date: 10/31/2019 11:33 AM    CT head without IV contrast    CLINICAL INFORMATION:  Confusion and changed mental status    TECHNIQUE: Contiguous axial sections were obtained through the head.     This scan was performed using automatic exposure control (radiation dose   reduction software) to obtain a diagnostic image quality scan with   patient dose as low as reasonably achievable.      COMPARISON: No previous examinations are available for review.     FINDINGS:       There is no evidence of intraparenchymal or extraaxial hemorrhage.     There is no CT evidence of large vessel acute infarct. No mass effect is   found in the brain.  No evidence of midline shift or herniation pattern.    The ventricles, sulci and basal cisterns appear unremarkable.    Visualized paranasal sinuses are clear.      IMPRESSION:       No acute intracranial findings.    < end of copied text >  < from: EEG Awake and Asleep (05.20.21 @ 09:00) >   EXAM:  EEG AWAKE AND ASLEEP        PROCEDURE DATE:  05/20/2021        INTERPRETATION:  16-channel EEG recording for this 76-year-old woman with a history of dementia, change in mental status, rule out seizures.    The background activity consist of bilateral symmetrical occipitally dominant 9-10 Hz low amplitude activity which attenuates with eye opening. Bifrontal symmetrical 15-20, low amplitude activity.  Drowsiness characterized by symmetrical attenuation the background activity.    Stepped photic stimulation did not demonstrate any abnormalities.    No focal slowing or epileptiform activity noted.    The EKG portion of the record demonstrates a normal sinus rhythm    Impression: Normal EEG performed with the patient awake and briefly drowsy    < end of copied text >

## 2021-05-20 NOTE — PROGRESS NOTE ADULT - SUBJECTIVE AND OBJECTIVE BOX
Reason for Admission: Encephalopathy    History of Present Illness:   76 year old female patient with reported history of dementia who currently lives alone presented to the ED after found to be confused by her neighbors. Patient noted to be alert but not responsive to questions asked by EMS at the time of their evaluation. Patient alert but notably confused- is very redirect-able. States she was bitten by ticks last night.    5.19: +confused, staring in space at tiimes, takes a long time to answer simple questions  5.20: more fluent today but still confused; disoriented to situation, oriented to her name and place       REVIEW OF SYSTEMS:    CONSTITUTIONAL: No weakness, No fevers or chills  ENT: No ear ache, No sorethroat  NECK: No pain, No stiffness  RESPIRATORY: No cough, No wheezing, No hemoptysis; No dyspnea  CARDIOVASCULAR: No chest pain, No palpitations  GASTROINTESTINAL: No abd pain, No nausea, No vomiting, No hematemesis, No diarrhea or constipation. No melena, No hematochezia.  GENITOURINARY: No dysuria, No  hematuria  NEUROLOGICAL: No diplopia, No paresthesia, No motor dysfunction  MUSCULOSKELETAL: No arthralgia, No myalgia  SKIN: No rashes, or lesions   PSYCH: no anxiety, no suicidal ideation    All other review of systems is negative unless indicated above    Vital Signs Last 24 Hrs  T(C): 36.4 (20 May 2021 08:03), Max: 36.7 (20 May 2021 05:58)  T(F): 97.6 (20 May 2021 08:03), Max: 98 (20 May 2021 05:58)  HR: 68 (20 May 2021 08:03) (61 - 87)  BP: 125/59 (20 May 2021 08:03) (125/59 - 144/68)  BP(mean): 75 (20 May 2021 08:03) (75 - 75)  RR: 18 (20 May 2021 08:03) (17 - 18)  SpO2: 95% (20 May 2021 08:03) (95% - 100%)    PHYSICAL EXAM:    GENERAL: NAD, Well nourished  HEENT:  NC/AT, EOMI, PERRLA, No scleral icterus, Moist mucous membranes  NECK: Supple, No JVD  CNS:  Alert & Oriented X1, Motor Strength 5/5 B/L upper and lower extremities; DTRs 2+ intact   LUNG: Normal Breath sounds, Clear to auscultation bilaterally, No rales, No rhonchi, No wheezing  HEART: RRR; No murmurs, No rubs  ABDOMEN: +BS, ST/ND/NT  GENITOURINARY: Voiding, Bladder not distended  EXTREMITIES:  2+ Peripheral Pulses, No clubbing, No cyanosis, No tibial edema  MUSCULOSKELTAL: Joints normal ROM, No TTP, No effusion  SKIN: no rashes  RECTAL: deferred, not indicated  BREAST: deferred                          11.4   5.99  )-----------( 172      ( 19 May 2021 06:52 )             35.4     05-19    140  |  109<H>  |  23  ----------------------------<  73  3.7   |  26  |  0.87    Ca    9.0      19 May 2021 06:52  Phos  3.7     05-19  Mg     2.3     05-19    TPro  5.9<L>  /  Alb  2.9<L>  /  TBili  0.5  /  DBili  x   /  AST  46<H>  /  ALT  38  /  AlkPhos  63  05-19    Vancomycin levels:   Cultures:     MEDICATIONS  (STANDING):  ARIPiprazole 5 milliGRAM(s) Oral daily  buPROPion XL (24-Hour) . 300 milliGRAM(s) Oral daily  citalopram 40 milliGRAM(s) Oral daily  clotrimazole 1% Topical Cream - Peds 1 Application(s) Topical two times a day  heparin   Injectable 5000 Unit(s) SubCutaneous every 12 hours  levothyroxine 125 MICROGram(s) Oral daily  metoprolol succinate ER 25 milliGRAM(s) Oral two times a day  mirtazapine 30 milliGRAM(s) Oral at bedtime  triamcinolone 0.1% Topical Cream - Peds 1 Application(s) Topical three times a day    MEDICATIONS  (PRN):  acetaminophen   Tablet .. 650 milliGRAM(s) Oral every 4 hours PRN Mild Pain (1 - 3)  clonazePAM  Tablet 0.5 milliGRAM(s) Oral three times a day PRN anxiety  ondansetron Injectable 4 milliGRAM(s) IV Push every 6 hours PRN Nausea      all labs reviewed  all imaging reviewed    a/p:    # Encephalopathy   -Etiology unclear    -check B12, Folate: normal  -NH4 level normal    -neuro checks per routine  -CT head negative for acute pathology  - EEG negative   Neuro evaluation     -no evidence of sepsis   U/A negative     _? iatrogenic:  will decrease Mirtazapine     # YULIA resolved  -likely pre-renal  -IVF hydration  -trend kidney function    #Hypothyroidism  -tsh noted to be low  decrease Synthroid     # Anxiety  -on klonopin prn    # HTN  -on metoprolol    # Depression  -on abilify   Reason for Admission: Encephalopathy    History of Present Illness:   76 year old female patient with reported history of dementia who currently lives alone presented to the ED after found to be confused by her neighbors. Patient noted to be alert but not responsive to questions asked by EMS at the time of their evaluation. Patient alert but notably confused- is very redirect-able. States she was bitten by ticks last night.    5.19: +confused, staring in space at tiimes, takes a long time to answer simple questions  5.20: more fluent today but still confused; disoriented to situation, oriented to her name and place       REVIEW OF SYSTEMS:    CONSTITUTIONAL: No weakness, No fevers or chills  ENT: No ear ache, No sorethroat  NECK: No pain, No stiffness  RESPIRATORY: No cough, No wheezing, No hemoptysis; No dyspnea  CARDIOVASCULAR: No chest pain, No palpitations  GASTROINTESTINAL: No abd pain, No nausea, No vomiting, No hematemesis, No diarrhea or constipation. No melena, No hematochezia.  GENITOURINARY: No dysuria, No  hematuria  NEUROLOGICAL: No diplopia, No paresthesia, No motor dysfunction  MUSCULOSKELETAL: No arthralgia, No myalgia  SKIN: No rashes, or lesions   PSYCH: no anxiety, no suicidal ideation    All other review of systems is negative unless indicated above    Vital Signs Last 24 Hrs  T(C): 36.4 (20 May 2021 08:03), Max: 36.7 (20 May 2021 05:58)  T(F): 97.6 (20 May 2021 08:03), Max: 98 (20 May 2021 05:58)  HR: 68 (20 May 2021 08:03) (61 - 87)  BP: 125/59 (20 May 2021 08:03) (125/59 - 144/68)  BP(mean): 75 (20 May 2021 08:03) (75 - 75)  RR: 18 (20 May 2021 08:03) (17 - 18)  SpO2: 95% (20 May 2021 08:03) (95% - 100%)    PHYSICAL EXAM:    GENERAL: NAD, Well nourished  HEENT:  NC/AT, EOMI, PERRLA, No scleral icterus, Moist mucous membranes  NECK: Supple, No JVD  CNS:  Alert & Oriented X1, Motor Strength 5/5 B/L upper and lower extremities; DTRs 2+ intact   LUNG: Normal Breath sounds, Clear to auscultation bilaterally, No rales, No rhonchi, No wheezing  HEART: RRR; No murmurs, No rubs  ABDOMEN: +BS, ST/ND/NT  GENITOURINARY: Voiding, Bladder not distended  EXTREMITIES:  2+ Peripheral Pulses, No clubbing, No cyanosis, No tibial edema  MUSCULOSKELTAL: Joints normal ROM, No TTP, No effusion  SKIN: no rashes  RECTAL: deferred, not indicated  BREAST: deferred                          11.4   5.99  )-----------( 172      ( 19 May 2021 06:52 )             35.4     05-19    140  |  109<H>  |  23  ----------------------------<  73  3.7   |  26  |  0.87    Ca    9.0      19 May 2021 06:52  Phos  3.7     05-19  Mg     2.3     05-19    TPro  5.9<L>  /  Alb  2.9<L>  /  TBili  0.5  /  DBili  x   /  AST  46<H>  /  ALT  38  /  AlkPhos  63  05-19    Vancomycin levels:   Cultures:     MEDICATIONS  (STANDING):  ARIPiprazole 5 milliGRAM(s) Oral daily  buPROPion XL (24-Hour) . 300 milliGRAM(s) Oral daily  citalopram 40 milliGRAM(s) Oral daily  clotrimazole 1% Topical Cream - Peds 1 Application(s) Topical two times a day  heparin   Injectable 5000 Unit(s) SubCutaneous every 12 hours  levothyroxine 125 MICROGram(s) Oral daily  metoprolol succinate ER 25 milliGRAM(s) Oral two times a day  mirtazapine 30 milliGRAM(s) Oral at bedtime  triamcinolone 0.1% Topical Cream - Peds 1 Application(s) Topical three times a day    MEDICATIONS  (PRN):  acetaminophen   Tablet .. 650 milliGRAM(s) Oral every 4 hours PRN Mild Pain (1 - 3)  clonazePAM  Tablet 0.5 milliGRAM(s) Oral three times a day PRN anxiety  ondansetron Injectable 4 milliGRAM(s) IV Push every 6 hours PRN Nausea      all labs reviewed  all imaging reviewed    a/p:    # Encephalopathy   -Etiology unclear    -check B12, Folate: normal  -NH4 level normal    -neuro checks per routine  -CT head negative for acute pathology  - EEG negative   Neuro evaluation     -no evidence of sepsis   U/A negative     _? iatrogenic:  will decrease Mirtazapine to 15mg HS    # YULIA resolved  -likely pre-renal  -IVF hydration  -trend kidney function    #Hypothyroidism  -tsh noted to be low  decrease Thyroid Armor from 135mg to 120mg/day  repeat TFTs as outpatient     # Anxiety  -on klonopin prn    # HTN  -on metoprolol    # Depression  -on abilify

## 2021-05-21 DIAGNOSIS — F05 DELIRIUM DUE TO KNOWN PHYSIOLOGICAL CONDITION: ICD-10-CM

## 2021-05-21 DIAGNOSIS — F03.90 UNSPECIFIED DEMENTIA WITHOUT BEHAVIORAL DISTURBANCE: ICD-10-CM

## 2021-05-21 PROCEDURE — 99233 SBSQ HOSP IP/OBS HIGH 50: CPT

## 2021-05-21 PROCEDURE — 99232 SBSQ HOSP IP/OBS MODERATE 35: CPT

## 2021-05-21 RX ORDER — CLONAZEPAM 1 MG
0.5 TABLET ORAL
Refills: 0 | Status: DISCONTINUED | OUTPATIENT
Start: 2021-05-21 | End: 2021-05-24

## 2021-05-21 RX ORDER — BUPROPION HYDROCHLORIDE 150 MG/1
150 TABLET, EXTENDED RELEASE ORAL DAILY
Refills: 0 | Status: DISCONTINUED | OUTPATIENT
Start: 2021-05-22 | End: 2021-05-26

## 2021-05-21 RX ORDER — TAMSULOSIN HYDROCHLORIDE 0.4 MG/1
0.4 CAPSULE ORAL ONCE
Refills: 0 | Status: COMPLETED | OUTPATIENT
Start: 2021-05-21 | End: 2021-05-21

## 2021-05-21 RX ADMIN — Medication 0.5 MILLIGRAM(S): at 17:12

## 2021-05-21 RX ADMIN — BUPROPION HYDROCHLORIDE 300 MILLIGRAM(S): 150 TABLET, EXTENDED RELEASE ORAL at 10:50

## 2021-05-21 RX ADMIN — Medication 25 MILLIGRAM(S): at 21:10

## 2021-05-21 RX ADMIN — HEPARIN SODIUM 5000 UNIT(S): 5000 INJECTION INTRAVENOUS; SUBCUTANEOUS at 21:10

## 2021-05-21 RX ADMIN — Medication 1 APPLICATION(S): at 21:10

## 2021-05-21 RX ADMIN — HEPARIN SODIUM 5000 UNIT(S): 5000 INJECTION INTRAVENOUS; SUBCUTANEOUS at 10:49

## 2021-05-21 RX ADMIN — TAMSULOSIN HYDROCHLORIDE 0.4 MILLIGRAM(S): 0.4 CAPSULE ORAL at 23:00

## 2021-05-21 RX ADMIN — Medication 1 APPLICATION(S): at 10:51

## 2021-05-21 RX ADMIN — MIRTAZAPINE 15 MILLIGRAM(S): 45 TABLET, ORALLY DISINTEGRATING ORAL at 21:11

## 2021-05-21 RX ADMIN — Medication 0.5 MILLIGRAM(S): at 21:09

## 2021-05-21 RX ADMIN — ARIPIPRAZOLE 5 MILLIGRAM(S): 15 TABLET ORAL at 10:50

## 2021-05-21 RX ADMIN — CITALOPRAM 40 MILLIGRAM(S): 10 TABLET, FILM COATED ORAL at 10:49

## 2021-05-21 RX ADMIN — Medication 25 MILLIGRAM(S): at 10:49

## 2021-05-21 RX ADMIN — Medication 1 MILLIGRAM(S): at 20:11

## 2021-05-21 RX ADMIN — Medication 0.5 MILLIGRAM(S): at 06:12

## 2021-05-21 NOTE — BEHAVIORAL HEALTH ASSESSMENT NOTE - SUMMARY
76 year-old, history of Anxiety, Depression, no in-patient hospitalization, no suicidal ideation or suicide attempt, admitted for further medical workup after being found wandering.     Patient presenting with delirium superimposed on underlying Dementia. Anticipating improving with improved underlying medical condition. Of note, baseline is coherent, linear, organized, and current presenting is an acute change in mental status.    PLAN  MEDICATIONS  (STANDING):  ARIPiprazole 5 milliGRAM(s) Oral daily  buPROPion XL (24-Hour) . 150 milliGRAM(s) Oral daily (reduced)  citalopram 30 milliGRAM(s) Oral daily (reduced)  mirtazapine 15 milliGRAM(s) Oral at bedtime  clonazePAM  Tablet 0.5 milliGRAM(s) twice daily (changed from standing)

## 2021-05-21 NOTE — BEHAVIORAL HEALTH ASSESSMENT NOTE - NSBHCHARTREVIEWVS_PSY_A_CORE FT
Vital Signs Last 24 Hrs  T(C): 36.9 (21 May 2021 15:45), Max: 36.9 (21 May 2021 15:45)  T(F): 98.5 (21 May 2021 15:45), Max: 98.5 (21 May 2021 15:45)  HR: 82 (21 May 2021 15:45) (78 - 82)  BP: 124/38 (21 May 2021 15:45) (124/38 - 141/64)  BP(mean): --  RR: 18 (21 May 2021 15:45) (18 - 18)  SpO2: 95% (21 May 2021 15:45) (95% - 98%)

## 2021-05-21 NOTE — BEHAVIORAL HEALTH ASSESSMENT NOTE - HPI (INCLUDE ILLNESS QUALITY, SEVERITY, DURATION, TIMING, CONTEXT, MODIFYING FACTORS, ASSOCIATED SIGNS AND SYMPTOMS)
76 year-old, history of Anxiety, Depression, no in-patient hospitalization, no suicidal ideation or suicide attempt, admitted for further medical workup after being found wandering.     Patient is alert and oriented to person and place but not time or situation. Patient is distracted, disorganized, impaired reasoning, illogical, impaired reasoning, looseness of association. Patient unable to provide much clinical history / participate in psychiatric evaluation.     Nephew provides collateral, stating patient has Anxiety, Depression, Dementia (since 2019), however at baseline is "sharp," linear, organized, coherent, logical, with no thought disorder; is independent, cares for self. Reports AMS being acute and not because of progressive Dementia.

## 2021-05-21 NOTE — PROGRESS NOTE ADULT - SUBJECTIVE AND OBJECTIVE BOX
Reason for Admission: Encephalopathy    History of Present Illness:   76 year old female patient with reported history of dementia who currently lives alone presented to the ED after found to be confused by her neighbors. Patient noted to be alert but not responsive to questions asked by EMS at the time of their evaluation. Patient alert but notably confused    5.19: +confused, staring in space at tiimes, takes a long time to answer simple questions  5.20: more fluent today but still confused; disoriented to situation, oriented to her name and place   5.21: patient is confused, +hallucinations at night, oriented to name and place only; doesnt' make sense, not coherent       REVIEW OF SYSTEMS:    unable to obtain due to dementia     All other review of systems is negative unless indicated above    Vital Signs Last 24 Hrs  T(C): 36.4 (21 May 2021 08:48), Max: 36.7 (20 May 2021 21:44)  T(F): 97.5 (21 May 2021 08:48), Max: 98.1 (20 May 2021 21:44)  HR: 79 (21 May 2021 08:48) (77 - 79)  BP: 141/64 (21 May 2021 08:48) (129/59 - 141/64)  RR: 18 (21 May 2021 08:48) (18 - 18)  SpO2: 96% (21 May 2021 08:48) (96% - 98%)    PHYSICAL EXAM:    GENERAL: NAD, Well nourished  HEENT:  NC/AT, EOMI, PERRLA, No scleral icterus, Moist mucous membranes  NECK: Supple, No JVD  CNS:  Alert & Oriented X1, Motor Strength 5/5 B/L upper and lower extremities; DTRs 2+ intact   LUNG: Normal Breath sounds, Clear to auscultation bilaterally, No rales, No rhonchi, No wheezing  HEART: RRR; No murmurs, No rubs  ABDOMEN: +BS, ST/ND/NT  GENITOURINARY: Voiding, Bladder not distended  EXTREMITIES:  2+ Peripheral Pulses, No clubbing, No cyanosis, No tibial edema  MUSCULOSKELTAL: Joints normal ROM, No TTP, No effusion  SKIN: no rashes  RECTAL: deferred, not indicated  BREAST: deferred                          11.4   5.99  )-----------( 172      ( 19 May 2021 06:52 )             35.4     05-19    140  |  109<H>  |  23  ----------------------------<  73  3.7   |  26  |  0.87    Ca    9.0      19 May 2021 06:52  Phos  3.7     05-19  Mg     2.3     05-19    TPro  5.9<L>  /  Alb  2.9<L>  /  TBili  0.5  /  DBili  x   /  AST  46<H>  /  ALT  38  /  AlkPhos  63  05-19    Vancomycin levels:   Cultures:     MEDICATIONS  (STANDING):  ARIPiprazole 5 milliGRAM(s) Oral daily  buPROPion XL (24-Hour) . 300 milliGRAM(s) Oral daily  citalopram 40 milliGRAM(s) Oral daily  clotrimazole 1% Topical Cream - Peds 1 Application(s) Topical two times a day  heparin   Injectable 5000 Unit(s) SubCutaneous every 12 hours  metoprolol succinate ER 25 milliGRAM(s) Oral two times a day  mirtazapine 15 milliGRAM(s) Oral at bedtime  thyroid 120 milliGRAM(s) Oral daily  triamcinolone 0.1% Topical Cream - Peds 1 Application(s) Topical three times a day        all labs reviewed  all imaging reviewed    a/p:    # Dementia with worsening confusion and hallucinations   -Neuro eval noted  -Psych eval to review medication profile     -Vit B12, Folate: normal  -NH4 level normal  -CT head negative for acute pathology  - EEG negative   -no evidence of sepsis   U/A negative     _? iatrogenic:  will decrease Mirtazapine to 15mg HS    # YULIA resolved  -likely pre-renal    #Hypothyroidism  -tsh noted to be low  decreased Thyroid Armor from 135mg to 120mg/day  repeat TFTs as outpatient     # Anxiety  -on klonopin prn    # HTN  -on metoprolol

## 2021-05-21 NOTE — BEHAVIORAL HEALTH ASSESSMENT NOTE - NSBHCHARTREVIEWLAB_PSY_A_CORE FT
Lactate Trend  05-18 @ 15:10 Lactate:1.5             CAPILLARY BLOOD GLUCOSE      POCT Blood Glucose.: 80 mg/dL (20 May 2021 17:26)        Culture Results:   No growth (05-18 @ 15:10)

## 2021-05-21 NOTE — BEHAVIORAL HEALTH ASSESSMENT NOTE - NSBHREFERDETAILS_PSY_A_CORE_FT
76 year old female patient with reported history of dementia who currently lives alone presented to the ED after found to be confused by her neighbors. Patient noted to be alert but not responsive to questions asked by EMS at the time of their evaluation. Patient alert but notably confused    5.19: +confused, staring in space at tiimes, takes a long time to answer simple questions  5.20: more fluent today but still confused; disoriented to situation, oriented to her name and place   5.21: patient is confused, +hallucinations at night, oriented to name and place only; doesnt' make sense, not coherent

## 2021-05-22 PROCEDURE — 99232 SBSQ HOSP IP/OBS MODERATE 35: CPT

## 2021-05-22 RX ADMIN — Medication 1 APPLICATION(S): at 22:54

## 2021-05-22 RX ADMIN — Medication 0.5 MILLIGRAM(S): at 22:53

## 2021-05-22 RX ADMIN — Medication 1 APPLICATION(S): at 05:54

## 2021-05-22 RX ADMIN — ARIPIPRAZOLE 5 MILLIGRAM(S): 15 TABLET ORAL at 10:04

## 2021-05-22 RX ADMIN — Medication 1 APPLICATION(S): at 16:43

## 2021-05-22 RX ADMIN — BUPROPION HYDROCHLORIDE 150 MILLIGRAM(S): 150 TABLET, EXTENDED RELEASE ORAL at 10:04

## 2021-05-22 RX ADMIN — Medication 0.5 MILLIGRAM(S): at 10:02

## 2021-05-22 RX ADMIN — MIRTAZAPINE 15 MILLIGRAM(S): 45 TABLET, ORALLY DISINTEGRATING ORAL at 22:53

## 2021-05-22 RX ADMIN — CITALOPRAM 40 MILLIGRAM(S): 10 TABLET, FILM COATED ORAL at 10:04

## 2021-05-22 RX ADMIN — Medication 1 APPLICATION(S): at 10:05

## 2021-05-22 RX ADMIN — Medication 650 MILLIGRAM(S): at 22:53

## 2021-05-22 RX ADMIN — HEPARIN SODIUM 5000 UNIT(S): 5000 INJECTION INTRAVENOUS; SUBCUTANEOUS at 22:56

## 2021-05-22 RX ADMIN — HEPARIN SODIUM 5000 UNIT(S): 5000 INJECTION INTRAVENOUS; SUBCUTANEOUS at 10:02

## 2021-05-22 RX ADMIN — Medication 25 MILLIGRAM(S): at 10:02

## 2021-05-22 RX ADMIN — Medication 120 MILLIGRAM(S): at 05:54

## 2021-05-22 RX ADMIN — Medication 25 MILLIGRAM(S): at 22:53

## 2021-05-22 NOTE — PROGRESS NOTE ADULT - SUBJECTIVE AND OBJECTIVE BOX
Reason for Admission: Encephalopathy    History of Present Illness:   76 year old female patient with reported history of dementia who currently lives alone presented to the ED after found to be confused by her neighbors. Patient noted to be alert but not responsive to questions asked by EMS at the time of their evaluation. Patient alert but notably confused    5.19: +confused, staring in space at tiimes, takes a long time to answer simple questions  5.20: more fluent today but still confused; disoriented to situation, oriented to her name and place   5.21: patient is confused, +hallucinations at night, oriented to name and place only; doesnt' make sense, not coherent   5/22: Pt. is confused but today is oriented to name, place, and time. Pt. states she feels like her "bladder is full".       REVIEW OF SYSTEMS:    unable to obtain due to dementia     All other review of systems is negative unless indicated above    Vital Signs Last 24 Hrs  T(C): 36.7 (22 May 2021 08:22), Max: 36.9 (21 May 2021 15:45)  T(F): 98 (22 May 2021 08:22), Max: 98.5 (21 May 2021 15:45)  HR: 80 (22 May 2021 08:22) (78 - 84)  BP: 133/61 (22 May 2021 08:22) (124/38 - 137/64)  BP(mean): --  RR: 18 (22 May 2021 08:22) (18 - 18)  SpO2: 97% (22 May 2021 08:22) (95% - 97%)    PHYSICAL EXAM:  GEN: lying in bed, NAD  HEENT:   NC/AT, pupils equal and reactive, EOMI  CV:  +S1, +S2, RRR  RESP:   lungs clear to auscultation bilaterally, no wheeze, rales, rhonchi   BREAST:  not examined  GI:  abdomen soft, non-tender, non-distended, normoactive BS  RECTAL:  not examined  :  not examined  MSK:   normal muscle tone  EXT:  no edema  NEURO:  AAOX3, no focal neurological deficits  SKIN:  no rashes    all labs reviewed  all imaging reviewed

## 2021-05-22 NOTE — PROGRESS NOTE ADULT - ASSESSMENT
76 year old female patient with reported history of dementia who currently lives alone presented to the ED after found to be confused by her neighbors.    # Dementia with worsening confusion and hallucinations   -Neuro eval noted  -Psych eval to review medication profile     -Vit B12, Folate: normal  -NH4 level normal  -CT head negative for acute pathology  - EEG negative   -no evidence of sepsis   U/A negative     _? iatrogenic:  will decrease Mirtazapine to 15mg HS    # YULIA resolved  -likely pre-renal    #Hypothyroidism  -tsh noted to be low  decreased Thyroid Armor from 135mg to 120mg/day  repeat TFTs as outpatient     # Anxiety  -on klonopin prn    # HTN  -on metoprolol    dispo- monday awaiting placement

## 2021-05-22 NOTE — PROGRESS NOTE BEHAVIORAL HEALTH - NSBHCONSULTFOLLOWDETAILS_PSY_A_CORE FT
Patient will be reassessed once more prior to discharge for psychiatric clearance. Will clarify psychiatric medication prescriber for follow-up.

## 2021-05-23 LAB
A PHAGOCYTOPH DNA BLD QL NAA+PROBE: NEGATIVE — SIGNIFICANT CHANGE UP
A PHAGOCYTOPH IGG TITR SER IF: SIGNIFICANT CHANGE UP TITER
APPEARANCE UR: ABNORMAL
B BURGDOR AB SER QL IA: NEGATIVE — SIGNIFICANT CHANGE UP
B MICROTI DNA BLD QL NAA+PROBE: NEGATIVE — SIGNIFICANT CHANGE UP
B MICROTI IGG TITR SER: SIGNIFICANT CHANGE UP TITER
B MIYAMOTOI GLPQ BLD QL NAA+NON-PROBE: NEGATIVE — SIGNIFICANT CHANGE UP
BABESIA DNA SPEC QL NAA+PROBE: NEGATIVE — SIGNIFICANT CHANGE UP
BABESIA DNA SPEC QL NAA+PROBE: NEGATIVE — SIGNIFICANT CHANGE UP
BILIRUB UR-MCNC: NEGATIVE — SIGNIFICANT CHANGE UP
COLOR SPEC: YELLOW — SIGNIFICANT CHANGE UP
DIFF PNL FLD: ABNORMAL
E CHAFFEENSIS DNA BLD QL NAA+PROBE: NEGATIVE — SIGNIFICANT CHANGE UP
E CHAFFEENSIS IGG TITR SER IF: SIGNIFICANT CHANGE UP TITER
E EWINGII DNA SPEC QL NAA+PROBE: NEGATIVE — SIGNIFICANT CHANGE UP
EHRLICHIA DNA SPEC QL NAA+PROBE: NEGATIVE — SIGNIFICANT CHANGE UP
GLUCOSE UR QL: NEGATIVE MG/DL — SIGNIFICANT CHANGE UP
KETONES UR-MCNC: NEGATIVE — SIGNIFICANT CHANGE UP
LEUKOCYTE ESTERASE UR-ACNC: ABNORMAL
NITRITE UR-MCNC: POSITIVE
PH UR: 7 — SIGNIFICANT CHANGE UP (ref 5–8)
PROT UR-MCNC: 100 MG/DL
SP GR SPEC: 1 — LOW (ref 1.01–1.02)
UROBILINOGEN FLD QL: 1 MG/DL

## 2021-05-23 PROCEDURE — 99232 SBSQ HOSP IP/OBS MODERATE 35: CPT

## 2021-05-23 RX ORDER — CEFTRIAXONE 500 MG/1
1000 INJECTION, POWDER, FOR SOLUTION INTRAMUSCULAR; INTRAVENOUS EVERY 24 HOURS
Refills: 0 | Status: DISCONTINUED | OUTPATIENT
Start: 2021-05-23 | End: 2021-05-23

## 2021-05-23 RX ORDER — CEFTRIAXONE 500 MG/1
1000 INJECTION, POWDER, FOR SOLUTION INTRAMUSCULAR; INTRAVENOUS EVERY 24 HOURS
Refills: 0 | Status: DISCONTINUED | OUTPATIENT
Start: 2021-05-23 | End: 2021-05-24

## 2021-05-23 RX ADMIN — Medication 25 MILLIGRAM(S): at 12:23

## 2021-05-23 RX ADMIN — Medication 1 APPLICATION(S): at 21:26

## 2021-05-23 RX ADMIN — Medication 0.5 MILLIGRAM(S): at 21:25

## 2021-05-23 RX ADMIN — MIRTAZAPINE 15 MILLIGRAM(S): 45 TABLET, ORALLY DISINTEGRATING ORAL at 21:25

## 2021-05-23 RX ADMIN — Medication 1 APPLICATION(S): at 15:55

## 2021-05-23 RX ADMIN — CITALOPRAM 40 MILLIGRAM(S): 10 TABLET, FILM COATED ORAL at 12:18

## 2021-05-23 RX ADMIN — Medication 1 APPLICATION(S): at 05:41

## 2021-05-23 RX ADMIN — Medication 650 MILLIGRAM(S): at 21:25

## 2021-05-23 RX ADMIN — Medication 0.5 MILLIGRAM(S): at 12:23

## 2021-05-23 RX ADMIN — Medication 650 MILLIGRAM(S): at 07:21

## 2021-05-23 RX ADMIN — Medication 25 MILLIGRAM(S): at 21:25

## 2021-05-23 RX ADMIN — BUPROPION HYDROCHLORIDE 150 MILLIGRAM(S): 150 TABLET, EXTENDED RELEASE ORAL at 12:19

## 2021-05-23 RX ADMIN — Medication 1 APPLICATION(S): at 12:25

## 2021-05-23 RX ADMIN — HEPARIN SODIUM 5000 UNIT(S): 5000 INJECTION INTRAVENOUS; SUBCUTANEOUS at 12:20

## 2021-05-23 RX ADMIN — Medication 120 MILLIGRAM(S): at 05:41

## 2021-05-23 RX ADMIN — ARIPIPRAZOLE 5 MILLIGRAM(S): 15 TABLET ORAL at 12:17

## 2021-05-23 RX ADMIN — HEPARIN SODIUM 5000 UNIT(S): 5000 INJECTION INTRAVENOUS; SUBCUTANEOUS at 21:25

## 2021-05-23 RX ADMIN — CEFTRIAXONE 1000 MILLIGRAM(S): 500 INJECTION, POWDER, FOR SOLUTION INTRAMUSCULAR; INTRAVENOUS at 23:59

## 2021-05-23 NOTE — PROGRESS NOTE ADULT - ASSESSMENT
76 year old female patient with reported history of dementia who currently lives alone presented to the ED after found to be confused by her neighbors.    # Dementia with worsening confusion and hallucinations   -Neuro eval noted  -Psych eval to review medication profile   - consult appreciated, will need to be re-evaluated prior to discharge. Will clarify medication prescriber for f/u.     #dysuria/urinary frequency   -Order UA, urine cx, T max: 100 last night, currently afebrile.   -UA 5/18 negative    -Vit B12, Folate: normal  -NH4 level normal  -CT head negative for acute pathology  - EEG negative   -no evidence of sepsis   U/A negative 5/18    _? iatrogenic:  will decrease Mirtazapine to 15mg HS    # YULIA resolved  -likely pre-renal    #Hypothyroidism  -tsh noted to be low  decreased Thyroid Armor from 135mg to 120mg/day  repeat TFTs as outpatient     # Anxiety  -on klonopin prn    # HTN  -on metoprolol    dispo- monday awaiting placement  76 year old female patient with reported history of dementia who currently lives alone presented to the ED after found to be confused by her neighbors.    # Dementia with worsening confusion and hallucinations   -Neuro eval noted  -Psych eval to review medication profile   - consult appreciated, will need to be re-evaluated prior to discharge. Will clarify medication prescriber for f/u.     #dysuria/urinary frequency   -Order UA, urine cx, T max: 100 last night, currently afebrile.   -UA 5/18 negative  -Vit B12, Folate: normal  -NH4 level normal  -CT head negative for acute pathology  - EEG negative   -no evidence of sepsis   U/A negative 5/18    _? iatrogenic:  will decrease Mirtazapine to 15mg HS    # YULIA resolved  -likely pre-renal    #Hypothyroidism  -tsh noted to be low  decreased Thyroid Armor from 135mg to 120mg/day  repeat TFTs as outpatient     # Anxiety  -on klonopin prn    # HTN  -on metoprolol    dispo- monday awaiting placement

## 2021-05-23 NOTE — PROGRESS NOTE ADULT - SUBJECTIVE AND OBJECTIVE BOX
Reason for Admission: Encephalopathy    History of Present Illness:   76 year old female patient with reported history of dementia who currently lives alone presented to the ED after found to be confused by her neighbors. Patient noted to be alert but not responsive to questions asked by EMS at the time of their evaluation. Patient alert but notably confused    5.19: +confused, staring in space at tiimes, takes a long time to answer simple questions  5.20: more fluent today but still confused; disoriented to situation, oriented to her name and place   5.21: patient is confused, +hallucinations at night, oriented to name and place only; doesnt' make sense, not coherent   5/22: Pt. is confused but today is oriented to name, place, and time. Pt. states she feels like her "bladder is full".   5/22: Pt. is A&Ox2: She states that she is having dysuria, urinary frequency, and some urinary incontinence when she sits up. No other acute complaints.     REVIEW OF SYSTEMS:    unable to obtain due to dementia     All other review of systems is negative unless indicated above    Vital Signs Last 24 Hrs  T(C): 37.1 (23 May 2021 08:31), Max: 37.8 (22 May 2021 16:04)  T(F): 98.7 (23 May 2021 08:31), Max: 100 (22 May 2021 16:04)  HR: 77 (23 May 2021 08:31) (77 - 84)  BP: 115/57 (23 May 2021 08:31) (115/57 - 118/61)  BP(mean): --  RR: 18 (23 May 2021 08:31) (17 - 20)  SpO2: 97% (23 May 2021 08:31) (97% - 98%)    PHYSICAL EXAM:  GEN: lying in bed, NAD  HEENT:   NC/AT, pupils equal and reactive, EOMI  CV:  +S1, +S2, RRR  RESP:   lungs clear to auscultation bilaterally, no wheeze, rales, rhonchi   BREAST:  not examined  GI:  abdomen soft, non-tender, non-distended, normoactive BS  RECTAL:  not examined  :  not examined  MSK:   normal muscle tone  EXT:  no edema  NEURO:  AAOX2 no focal neurological deficits  SKIN:  no rashes    all labs reviewed  all imaging reviewed

## 2021-05-23 NOTE — CHART NOTE - NSCHARTNOTEFT_GEN_A_CORE
HOSPITALIST PA CHART NOTE     Notified by RN of patient's positive UA results     Urinalysis Basic - ( 23 May 2021 18:07 )    Color: Yellow / Appearance: Slightly Turbid / S.005 / pH: x  Gluc: x / Ketone: Negative  / Bili: Negative / Urobili: 1 mg/dL   Blood: x / Protein: 100 mg/dL / Nitrite: Positive   Leuk Esterase: Moderate / RBC: 3-5 /HPF / WBC >50   Sq Epi: x / Non Sq Epi: Occasional / Bacteria: TNTC      Patient was complaining of dysuria, frequency, incontinence. No urine culture was sent.   In comparison to UA from , patient has an active infection.   No leukocytosis, borderline low-grade fever (Tmax 100.3), however patient has hx of dementia and is admitted with encephalopathy   Will initiate abx with Rocephin, after urine culture is collected   Monitor CBC trend in AM     Vital Signs Last 24 Hrs  T(C): 37.9 (23 May 2021 21:15), Max: 37.9 (23 May 2021 21:15)  T(F): 100.3 (23 May 2021 21:15), Max: 100.3 (23 May 2021 21:15)  HR: 85 (23 May 2021 21:15) (77 - 85)  BP: 125/60 (23 May 2021 21:15) (115/57 - 132/60)  BP(mean): --  RR: 18 (23 May 2021 21:15) (18 - 18)  SpO2: 96% (23 May 2021 21:15) (96% - 98%)

## 2021-05-23 NOTE — PROGRESS NOTE BEHAVIORAL HEALTH - NSBHFUPINTERVALHXFT_PSY_A_CORE
This is a 76 year-old, , female, with a history of Anxiety, Depression, no past psychiatric  hospitalization, no past suicidal ideation or suicide attempts who was admitted for further medical workup after being found wandering with altered mental status.     Met with patient at bedtime today for a follow-up reassessment. She is fully alert and oriented X 3; had trouble recalling the current year; states the year is 2001 instead of 2021. She was able to state where she is, but she is unable to recall the circumstances leading to this hospitalization. She spoke about falling in her garage for the second time. She denies current suicidal or homicidal ideations; she denies current intent or plans to die. She appears mildly irritable and easily distracted. She will benefit from reassessment prior to discharge.

## 2021-05-24 DIAGNOSIS — R29.818 OTHER SYMPTOMS AND SIGNS INVOLVING THE NERVOUS SYSTEM: ICD-10-CM

## 2021-05-24 DIAGNOSIS — F33.42 MAJOR DEPRESSIVE DISORDER, RECURRENT, IN FULL REMISSION: ICD-10-CM

## 2021-05-24 LAB — SARS-COV-2 RNA SPEC QL NAA+PROBE: SIGNIFICANT CHANGE UP

## 2021-05-24 PROCEDURE — 99232 SBSQ HOSP IP/OBS MODERATE 35: CPT

## 2021-05-24 RX ORDER — CEFTRIAXONE 500 MG/1
1000 INJECTION, POWDER, FOR SOLUTION INTRAMUSCULAR; INTRAVENOUS EVERY 24 HOURS
Refills: 0 | Status: DISCONTINUED | OUTPATIENT
Start: 2021-05-24 | End: 2021-05-24

## 2021-05-24 RX ORDER — CLONAZEPAM 1 MG
0.25 TABLET ORAL
Refills: 0 | Status: DISCONTINUED | OUTPATIENT
Start: 2021-05-24 | End: 2021-05-26

## 2021-05-24 RX ORDER — CEFTRIAXONE 500 MG/1
1000 INJECTION, POWDER, FOR SOLUTION INTRAMUSCULAR; INTRAVENOUS ONCE
Refills: 0 | Status: DISCONTINUED | OUTPATIENT
Start: 2021-05-24 | End: 2021-05-24

## 2021-05-24 RX ORDER — CEFTRIAXONE 500 MG/1
1000 INJECTION, POWDER, FOR SOLUTION INTRAMUSCULAR; INTRAVENOUS EVERY 24 HOURS
Refills: 0 | Status: COMPLETED | OUTPATIENT
Start: 2021-05-24 | End: 2021-05-26

## 2021-05-24 RX ADMIN — Medication 650 MILLIGRAM(S): at 05:22

## 2021-05-24 RX ADMIN — Medication 1 APPLICATION(S): at 10:55

## 2021-05-24 RX ADMIN — BUPROPION HYDROCHLORIDE 150 MILLIGRAM(S): 150 TABLET, EXTENDED RELEASE ORAL at 10:55

## 2021-05-24 RX ADMIN — Medication 1 APPLICATION(S): at 22:00

## 2021-05-24 RX ADMIN — ARIPIPRAZOLE 5 MILLIGRAM(S): 15 TABLET ORAL at 10:55

## 2021-05-24 RX ADMIN — Medication 1 APPLICATION(S): at 13:00

## 2021-05-24 RX ADMIN — MIRTAZAPINE 15 MILLIGRAM(S): 45 TABLET, ORALLY DISINTEGRATING ORAL at 21:52

## 2021-05-24 RX ADMIN — Medication 120 MILLIGRAM(S): at 05:21

## 2021-05-24 RX ADMIN — Medication 0.25 MILLIGRAM(S): at 21:52

## 2021-05-24 RX ADMIN — Medication 25 MILLIGRAM(S): at 10:55

## 2021-05-24 RX ADMIN — HEPARIN SODIUM 5000 UNIT(S): 5000 INJECTION INTRAVENOUS; SUBCUTANEOUS at 10:55

## 2021-05-24 RX ADMIN — CEFTRIAXONE 1000 MILLIGRAM(S): 500 INJECTION, POWDER, FOR SOLUTION INTRAMUSCULAR; INTRAVENOUS at 13:01

## 2021-05-24 RX ADMIN — HEPARIN SODIUM 5000 UNIT(S): 5000 INJECTION INTRAVENOUS; SUBCUTANEOUS at 21:52

## 2021-05-24 RX ADMIN — Medication 0.5 MILLIGRAM(S): at 10:55

## 2021-05-24 RX ADMIN — CITALOPRAM 40 MILLIGRAM(S): 10 TABLET, FILM COATED ORAL at 10:55

## 2021-05-24 RX ADMIN — Medication 1 APPLICATION(S): at 21:53

## 2021-05-24 RX ADMIN — Medication 25 MILLIGRAM(S): at 21:52

## 2021-05-24 NOTE — PROGRESS NOTE ADULT - ASSESSMENT
76 year old female patient with reported history of dementia who currently lives alone presented to the ED after found to be confused by her neighbors.    # Dementia with worsening confusion and hallucinations   -Neuro eval noted  -Psych eval to review medication profile   - consult appreciated, will need to be re-evaluated prior to discharge for further safety assessment. Will clarify medication prescriber for f/u. Nephew mentioned Dr. Martinez was previous prescriber.     #dysuria/urinary frequency   T max: 100.3 last night, currently afebrile.   -UA consistent for UTI. Start Rocephin   - no evidence of sepsis   -urine culture, pending   -UA 5/18 negative  -Vit B12, Folate: normal  -NH4 level normal  -CT head negative for acute pathology  - EEG negative     _? iatrogenic:  Decreased Mirtazapine to 15mg HS    # YULIA resolved  -likely pre-renal    #Hypothyroidism  -tsh noted to be low  decreased Thyroid Armor from 135mg to 120mg/day  repeat TFTs as outpatient     # Anxiety  -on klonopin prn    # HTN  -on metoprolol    dispo- awaiting placement     -Have been speaking with emmanuel Cm.  76 year old female patient with reported history of dementia who currently lives alone presented to the ED after found to be confused by her neighbors.    # Dementia with worsening confusion and hallucinations   -Neuro eval noted  -Psych eval to review medication profile   - consult appreciated, will need to be re-evaluated prior to discharge for further safety assessment. Will clarify medication prescriber for f/u. Nephtoney mentioned Dr. Drake was previous prescriber.     #dysuria/urinary frequency   T max: 100.3 last night, currently afebrile.   -UA consistent for UTI. Start Rocephin   - no evidence of sepsis   -urine culture, pending   -UA 5/18 negative  -Vit B12, Folate: normal  -NH4 level normal  -CT head negative for acute pathology  - EEG negative     #MDD / Anxiety/ neurocognitive deficits  - D/c Celexa 40 mg  - continue Wellbutrin 150 mg, Abilify 5 mg, and Remeron 15 mg  - decrease Klonopin to 0.25 mg BID because of fall risk  - Will f/u with psych Dr. Drake   - no psych c/i to discharge.       #Hypothyroidism  -tsh noted to be low  decreased Thyroid Armor from 135mg to 120mg/day  repeat TFTs as outpatient     # HTN  -on metoprolol    dispo- awaiting placement and resolution of fever     -Have been speaking with emmanuel Cm.  76 year old female patient with reported history of dementia who currently lives alone presented to the ED after found to be confused by her neighbors.    # Dementia with worsening confusion and hallucinations   -Neuro eval noted  -Psych eval to review medication profile   - consult appreciated, will need to be re-evaluated prior to discharge for further safety assessment. Will clarify medication prescriber for f/u. Emmanuel mentioned Dr. Drake was previous prescriber.     #dysuria/urinary frequency   T max: 100.3 last night, currently afebrile.   -UA consistent for UTI. Start Rocephin   - no evidence of sepsis   -urine culture, pending   -UA 5/18 negative  -Vit B12, Folate: normal  -NH4 level normal  -CT head negative for acute pathology  - EEG negative     #MDD / Anxiety/ neurocognitive deficits  - D/c Celexa 40 mg as per psych   - continue Wellbutrin 150 mg, Abilify 5 mg, and Remeron 15 mg  - decrease Klonopin to 0.25 mg BID because of fall risk  - Will f/u with psych Dr. Drake   - no psych c/i to discharge.       #Hypothyroidism  -tsh noted to be low  decreased Thyroid Armor from 135mg to 120mg/day  repeat TFTs as outpatient     # HTN  -on metoprolol    dispo- awaiting placement and resolution of fever and f/u ucx     -Have been speaking with emmanuel Cm.

## 2021-05-24 NOTE — PROGRESS NOTE BEHAVIORAL HEALTH - NSBHFUPINTERVALHXFT_PSY_A_CORE
Pt is in her chair, [email or web address] said she mendez not want to  talk to psychiatrist, but co-operates and answers the questions..  PT denies depression  and SI, NO agitation, no anger, no aggressive behavior.   Per Dr Cotto pt has underlying cognitive deterioration, not related to resolved delirium. Pt has EEG  on 5/20/2021 that was normal. On antibiotic for UTI.   Last QtC on 5/18 was 495. Pt is on 3 antidepressants prescribed by Dr Drake in community.   Pt knows where she is, and reads the date 5/24 from the board. Pt is in her chair, initially said she does  not want to  talk to psychiatrist, but co-operated and answered the questions..  PT denies depression  and SI, NO agitation, no anger, no aggressive behavior.   Per Dr Cotto pt has underlying cognitive deterioration, not related to resolving delirium. Pt had EEG  on 5/20/2021 that was normal. On antibiotic for UTI.   Last QtC on 5/18 was 495. Pt is on 3 antidepressants prescribed by Dr Drake in community.   Pt knows where she is, and reads the date 5/24 from the board.

## 2021-05-24 NOTE — PROGRESS NOTE ADULT - SUBJECTIVE AND OBJECTIVE BOX
Reason for Admission: Encephalopathy    History of Present Illness:   76 year old female patient with reported history of dementia who currently lives alone presented to the ED after found to be confused by her neighbors. Patient noted to be alert but not responsive to questions asked by EMS at the time of their evaluation. Patient alert but notably confused    5.19: +confused, staring in space at tiimes, takes a long time to answer simple questions  5.20: more fluent today but still confused; disoriented to situation, oriented to her name and place   5.21: patient is confused, +hallucinations at night, oriented to name and place only; doesnt' make sense, not coherent   : Pt. is confused but today is oriented to name, place, and time. Pt. states she feels like her "bladder is full".   : Pt. is A&Ox2: She states that she is having dysuria, urinary frequency, and some urinary incontinence when she sits up. No other acute complaints.   : Pt. is complaining of lower abdominal pain, urinary frequency, and dysuria. UA positive for UTI. No other acute complaints     REVIEW OF SYSTEMS:    unable to obtain due to dementia     All other review of systems is negative unless indicated above    Vital Signs Last 24 Hrs  T(C): 37.1 (24 May 2021 08:10), Max: 37.9 (23 May 2021 21:15)  T(F): 98.8 (24 May 2021 08:10), Max: 100.3 (23 May 2021 21:15)  HR: 84 (24 May 2021 08:10) (81 - 85)  BP: 122/62 (24 May 2021 08:10) (122/62 - 132/60)  BP(mean): --  RR: 18 (24 May 2021 08:10) (18 - 18)  SpO2: 98% (24 May 2021 08:10) (96% - 98%)      PHYSICAL EXAM:  GEN: lying in bed, NAD  HEENT:   NC/AT, pupils equal and reactive, EOMI  CV:  +S1, +S2, RRR  RESP:   lungs clear to auscultation bilaterally, no wheeze, rales, rhonchi   BREAST:  not examined  GI: abdomen soft, non-tender, non-distended, normoactive BS  RECTAL:  not examined  :  not examined  MSK:   normal muscle tone  EXT:  no edema  NEURO:  AAOX2 no focal neurological deficits  SKIN:  no rashes    Labs reviewed:    Urinalysis Basic - ( 23 May 2021 18:07 )    Color: Yellow / Appearance: Slightly Turbid / S.005 / pH: x  Gluc: x / Ketone: Negative  / Bili: Negative / Urobili: 1 mg/dL   Blood: x / Protein: 100 mg/dL / Nitrite: Positive   Leuk Esterase: Moderate / RBC: 3-5 /HPF / WBC >50   Sq Epi: x / Non Sq Epi: Occasional / Bacteria: TNTC    all imaging reviewed  < from: EEG Awake and Asleep (21 @ 09:00) >    Impression: Normal EEG performed with the patient awake and briefly drowsy    < end of copied text >      < from: CT Head No Cont (21 @ 15:35) >  IMPRESSION:  Limited exam due to motion    No acute intracranial hemorrhage or acute territorial infarct.  If symptoms persist, follow-up MRI exam recommended.    < end of copied text >   Reason for Admission: Encephalopathy    History of Present Illness:   76 year old female patient with reported history of dementia who currently lives alone presented to the ED after found to be confused by her neighbors. Patient noted to be alert but not responsive to questions asked by EMS at the time of their evaluation. Patient alert but notably confused    5.19: +confused, staring in space at tiimes, takes a long time to answer simple questions  5.20: more fluent today but still confused; disoriented to situation, oriented to her name and place   5.21: patient is confused, +hallucinations at night, oriented to name and place only; doesnt' make sense, not coherent   : Pt. is confused but today is oriented to name, place, and time. Pt. states she feels like her "bladder is full".   : Pt. is A&Ox2: She states that she is having dysuria, urinary frequency, and some urinary incontinence when she sits up. No other acute complaints.   : Pt. has been increasingly more confused and guarded today. Pt. is complaining of lower abdominal pain, urinary frequency, and dysuria. UA positive for UTI. No other acute complaints     REVIEW OF SYSTEMS:    unable to obtain due to dementia     All other review of systems is negative unless indicated above    Vital Signs Last 24 Hrs  T(C): 37.1 (24 May 2021 08:10), Max: 37.9 (23 May 2021 21:15)  T(F): 98.8 (24 May 2021 08:10), Max: 100.3 (23 May 2021 21:15)  HR: 84 (24 May 2021 08:10) (81 - 85)  BP: 122/62 (24 May 2021 08:10) (122/62 - 132/60)  BP(mean): --  RR: 18 (24 May 2021 08:10) (18 - 18)  SpO2: 98% (24 May 2021 08:10) (96% - 98%)      PHYSICAL EXAM:  GEN: lying in bed, NAD  HEENT:   NC/AT, pupils equal and reactive, EOMI  CV:  +S1, +S2, RRR  RESP:   lungs clear to auscultation bilaterally, no wheeze, rales, rhonchi   BREAST:  not examined  GI: abdomen soft, non-tender, non-distended, normoactive BS  RECTAL:  not examined  :  not examined  MSK:   normal muscle tone  EXT:  no edema  NEURO:  AAOX2 no focal neurological deficits  SKIN:  no rashes    Labs reviewed:    Urinalysis Basic - ( 23 May 2021 18:07 )    Color: Yellow / Appearance: Slightly Turbid / S.005 / pH: x  Gluc: x / Ketone: Negative  / Bili: Negative / Urobili: 1 mg/dL   Blood: x / Protein: 100 mg/dL / Nitrite: Positive   Leuk Esterase: Moderate / RBC: 3-5 /HPF / WBC >50   Sq Epi: x / Non Sq Epi: Occasional / Bacteria: TNTC    all imaging reviewed  < from: EEG Awake and Asleep (21 @ 09:00) >    Impression: Normal EEG performed with the patient awake and briefly drowsy    < end of copied text >      < from: CT Head No Cont (21 @ 15:35) >  IMPRESSION:  Limited exam due to motion    No acute intracranial hemorrhage or acute territorial infarct.  If symptoms persist, follow-up MRI exam recommended.    < end of copied text >

## 2021-05-25 LAB
ALBUMIN SERPL ELPH-MCNC: 2.7 G/DL — LOW (ref 3.3–5)
ALP SERPL-CCNC: 65 U/L — SIGNIFICANT CHANGE UP (ref 40–120)
ALT FLD-CCNC: 53 U/L — SIGNIFICANT CHANGE UP (ref 12–78)
ANION GAP SERPL CALC-SCNC: 5 MMOL/L — SIGNIFICANT CHANGE UP (ref 5–17)
AST SERPL-CCNC: 58 U/L — HIGH (ref 15–37)
BILIRUB SERPL-MCNC: 0.4 MG/DL — SIGNIFICANT CHANGE UP (ref 0.2–1.2)
BUN SERPL-MCNC: 17 MG/DL — SIGNIFICANT CHANGE UP (ref 7–23)
CALCIUM SERPL-MCNC: 8.8 MG/DL — SIGNIFICANT CHANGE UP (ref 8.5–10.1)
CHLORIDE SERPL-SCNC: 105 MMOL/L — SIGNIFICANT CHANGE UP (ref 96–108)
CO2 SERPL-SCNC: 28 MMOL/L — SIGNIFICANT CHANGE UP (ref 22–31)
CREAT SERPL-MCNC: 0.77 MG/DL — SIGNIFICANT CHANGE UP (ref 0.5–1.3)
GLUCOSE SERPL-MCNC: 81 MG/DL — SIGNIFICANT CHANGE UP (ref 70–99)
HCT VFR BLD CALC: 40.2 % — SIGNIFICANT CHANGE UP (ref 34.5–45)
HGB BLD-MCNC: 13 G/DL — SIGNIFICANT CHANGE UP (ref 11.5–15.5)
MCHC RBC-ENTMCNC: 29.1 PG — SIGNIFICANT CHANGE UP (ref 27–34)
MCHC RBC-ENTMCNC: 32.3 GM/DL — SIGNIFICANT CHANGE UP (ref 32–36)
MCV RBC AUTO: 89.9 FL — SIGNIFICANT CHANGE UP (ref 80–100)
PLATELET # BLD AUTO: 163 K/UL — SIGNIFICANT CHANGE UP (ref 150–400)
POTASSIUM SERPL-MCNC: 3.9 MMOL/L — SIGNIFICANT CHANGE UP (ref 3.5–5.3)
POTASSIUM SERPL-SCNC: 3.9 MMOL/L — SIGNIFICANT CHANGE UP (ref 3.5–5.3)
PROT SERPL-MCNC: 6.2 GM/DL — SIGNIFICANT CHANGE UP (ref 6–8.3)
RBC # BLD: 4.47 M/UL — SIGNIFICANT CHANGE UP (ref 3.8–5.2)
RBC # FLD: 14.1 % — SIGNIFICANT CHANGE UP (ref 10.3–14.5)
SODIUM SERPL-SCNC: 138 MMOL/L — SIGNIFICANT CHANGE UP (ref 135–145)
WBC # BLD: 4.47 K/UL — SIGNIFICANT CHANGE UP (ref 3.8–10.5)
WBC # FLD AUTO: 4.47 K/UL — SIGNIFICANT CHANGE UP (ref 3.8–10.5)

## 2021-05-25 PROCEDURE — 99232 SBSQ HOSP IP/OBS MODERATE 35: CPT

## 2021-05-25 RX ADMIN — Medication 1 APPLICATION(S): at 10:06

## 2021-05-25 RX ADMIN — Medication 1 APPLICATION(S): at 17:41

## 2021-05-25 RX ADMIN — MIRTAZAPINE 15 MILLIGRAM(S): 45 TABLET, ORALLY DISINTEGRATING ORAL at 21:50

## 2021-05-25 RX ADMIN — CEFTRIAXONE 1000 MILLIGRAM(S): 500 INJECTION, POWDER, FOR SOLUTION INTRAMUSCULAR; INTRAVENOUS at 17:40

## 2021-05-25 RX ADMIN — HEPARIN SODIUM 5000 UNIT(S): 5000 INJECTION INTRAVENOUS; SUBCUTANEOUS at 21:49

## 2021-05-25 RX ADMIN — Medication 0.25 MILLIGRAM(S): at 10:04

## 2021-05-25 RX ADMIN — Medication 120 MILLIGRAM(S): at 05:32

## 2021-05-25 RX ADMIN — Medication 1 APPLICATION(S): at 05:32

## 2021-05-25 RX ADMIN — Medication 25 MILLIGRAM(S): at 21:50

## 2021-05-25 RX ADMIN — Medication 1 APPLICATION(S): at 21:50

## 2021-05-25 RX ADMIN — Medication 1 APPLICATION(S): at 21:51

## 2021-05-25 RX ADMIN — Medication 25 MILLIGRAM(S): at 10:05

## 2021-05-25 RX ADMIN — HEPARIN SODIUM 5000 UNIT(S): 5000 INJECTION INTRAVENOUS; SUBCUTANEOUS at 10:05

## 2021-05-25 RX ADMIN — Medication 0.25 MILLIGRAM(S): at 21:49

## 2021-05-25 RX ADMIN — BUPROPION HYDROCHLORIDE 150 MILLIGRAM(S): 150 TABLET, EXTENDED RELEASE ORAL at 10:04

## 2021-05-25 RX ADMIN — ARIPIPRAZOLE 5 MILLIGRAM(S): 15 TABLET ORAL at 10:04

## 2021-05-25 NOTE — PROGRESS NOTE ADULT - REASON FOR ADMISSION
Encephalopathy

## 2021-05-25 NOTE — PROGRESS NOTE ADULT - ASSESSMENT
76 year old female patient with reported history of dementia who currently lives alone presented to the ED after found to be confused by her neighbors.    # Dementia with worsening confusion and hallucinations   -Neuro eval noted  -Psych eval to review medication profile   - consult appreciated, will need to be re-evaluated prior to discharge for further safety assessment. Will clarify medication prescriber for f/u. Nephew mentioned Dr. Drake was previous prescriber.     #dysuria/urinary frequency   T max: 100.3 last night, currently afebrile.   -UA consistent for UTI. Start Rocephin   - no evidence of sepsis   -urine culture, pending   -UA 5/18 negative  -Vit B12, Folate: normal  -NH4 level normal  -CT head negative for acute pathology  - EEG negative     #MDD / Anxiety/ neurocognitive deficits  - D/c Celexa 40 mg as per psych   - continue Wellbutrin 150 mg, Abilify 5 mg, and Remeron 15 mg  - decrease Klonopin to 0.25 mg BID because of fall risk  - Will f/u with psych Dr. Drake   - no psych c/i to discharge.   #Hypothyroidism  -tsh noted to be low  decreased Thyroid Armor from 135mg to 120mg/day  repeat TFTs as outpatient     # HTN  -on metoprolol    dispo- awaiting placement and resolution of fever and f/u ucx     -Have been speaking with emmanuel Cm.  pt febrile overnight and +UA.  continue rocpehin which she tolerated ydy.  PCN allergy noted but tolerating cephalosporins   - case d/w dr urena.  stop celexa.  decrease klonopin.  continue wellbutrin and abilify.  pt paranoid at time but cleared for dc.  needs outpt formal neuro psych eval after dc as most likely pt has dementia but re assess once abx completed as there could be component of delirium.     dispo - dc to rehab tomorrow if afebrile.

## 2021-05-25 NOTE — PROGRESS NOTE ADULT - SUBJECTIVE AND OBJECTIVE BOX
76 year old female patient with reported history of dementia who currently lives alone presented to the ED after found to be confused by her neighbors. Patient noted to be alert but not responsive to questions asked by EMS at the time of their evaluation. Patient alert but notably confused    5.19: +confused, staring in space at tiimes, takes a long time to answer simple questions  5.20: more fluent today but still confused; disoriented to situation, oriented to her name and place   5.21: patient is confused, +hallucinations at night, oriented to name and place only; doesnt' make sense, not coherent   : Pt. is confused but today is oriented to name, place, and time. Pt. states she feels like her "bladder is full".   : Pt. is A&Ox2: She states that she is having dysuria, urinary frequency, and some urinary incontinence when she sits up. No other acute complaints.   : Pt. has been increasingly more confused and guarded today. Pt. is complaining of lower abdominal pain, urinary frequency, and dysuria. UA positive for UTI. No other acute complaints    less confused today , afebrile       PHYSICAL EXAM:  GEN: lying in bed, NAD  HEENT:   NC/AT, pupils equal and reactive, EOMI  CV:  +S1, +S2, RRR  RESP:   lungs clear to auscultation bilaterally, no wheeze, rales, rhonchi   BREAST:  not examined  GI: abdomen soft, non-tender, non-distended, normoactive BS  RECTAL:  not examined  :  not examined  MSK:   normal muscle tone  EXT:  no edema  NEURO:  AAOX2 no focal neurological deficits  SKIN:  no rashes      PHYSICAL EXAM:    Daily     Daily     ICU Vital Signs Last 24 Hrs  T(C): 36.7 (25 May 2021 15:56), Max: 36.7 (25 May 2021 07:44)  T(F): 98.1 (25 May 2021 15:56), Max: 98.1 (25 May 2021 15:56)  HR: 83 (25 May 2021 15:56) (78 - 83)  BP: 137/73 (25 May 2021 15:56) (122/64 - 138/75)  BP(mean): --  ABP: --  ABP(mean): --  RR: 17 (25 May 2021 15:56) (16 - 17)  SpO2: 94% (25 May 2021 15:56) (94% - 98%)                              13.0   4.47  )-----------( 163      ( 25 May 2021 05:45 )             40.2       CBC Full  -  ( 25 May 2021 05:45 )  WBC Count : 4.47 K/uL  RBC Count : 4.47 M/uL  Hemoglobin : 13.0 g/dL  Hematocrit : 40.2 %  Platelet Count - Automated : 163 K/uL  Mean Cell Volume : 89.9 fl  Mean Cell Hemoglobin : 29.1 pg  Mean Cell Hemoglobin Concentration : 32.3 gm/dL  Auto Neutrophil # : x  Auto Lymphocyte # : x  Auto Monocyte # : x  Auto Eosinophil # : x  Auto Basophil # : x  Auto Neutrophil % : x  Auto Lymphocyte % : x  Auto Monocyte % : x  Auto Eosinophil % : x  Auto Basophil % : x          138  |  105  |  17  ----------------------------<  81  3.9   |  28  |  0.77    Ca    8.8      25 May 2021 05:45    TPro  6.2  /  Alb  2.7<L>  /  TBili  0.4  /  DBili  x   /  AST  58<H>  /  ALT  53  /  AlkPhos  65        LIVER FUNCTIONS - ( 25 May 2021 05:45 )  Alb: 2.7 g/dL / Pro: 6.2 gm/dL / ALK PHOS: 65 U/L / ALT: 53 U/L / AST: 58 U/L / GGT: x                       Urinalysis Basic - ( 23 May 2021 18:07 )    Color: Yellow / Appearance: Slightly Turbid / S.005 / pH: x  Gluc: x / Ketone: Negative  / Bili: Negative / Urobili: 1 mg/dL   Blood: x / Protein: 100 mg/dL / Nitrite: Positive   Leuk Esterase: Moderate / RBC: 3-5 /HPF / WBC >50   Sq Epi: x / Non Sq Epi: Occasional / Bacteria: TNTC            MEDICATIONS  (STANDING):  ARIPiprazole 5 milliGRAM(s) Oral daily  buPROPion XL (24-Hour) . 150 milliGRAM(s) Oral daily  cefTRIAXone Injectable. 1000 milliGRAM(s) IV Push every 24 hours  clonazePAM  Tablet 0.25 milliGRAM(s) Oral two times a day  clotrimazole 1% Topical Cream - Peds 1 Application(s) Topical two times a day  heparin   Injectable 5000 Unit(s) SubCutaneous every 12 hours  metoprolol succinate ER 25 milliGRAM(s) Oral two times a day  mirtazapine 15 milliGRAM(s) Oral at bedtime  thyroid 120 milliGRAM(s) Oral daily  triamcinolone 0.1% Topical Cream - Peds 1 Application(s) Topical three times a day

## 2021-05-26 ENCOUNTER — TRANSCRIPTION ENCOUNTER (OUTPATIENT)
Age: 76
End: 2021-05-26

## 2021-05-26 VITALS
DIASTOLIC BLOOD PRESSURE: 68 MMHG | RESPIRATION RATE: 18 BRPM | HEART RATE: 84 BPM | TEMPERATURE: 97 F | SYSTOLIC BLOOD PRESSURE: 149 MMHG | OXYGEN SATURATION: 95 %

## 2021-05-26 PROCEDURE — 99232 SBSQ HOSP IP/OBS MODERATE 35: CPT

## 2021-05-26 PROCEDURE — 99239 HOSP IP/OBS DSCHRG MGMT >30: CPT

## 2021-05-26 RX ORDER — THYROID 120 MG
2 TABLET ORAL
Qty: 0 | Refills: 0 | DISCHARGE
Start: 2021-05-26

## 2021-05-26 RX ORDER — MIRTAZAPINE 45 MG/1
1 TABLET, ORALLY DISINTEGRATING ORAL
Qty: 0 | Refills: 0 | DISCHARGE

## 2021-05-26 RX ORDER — THYROID 120 MG
1 TABLET ORAL
Qty: 0 | Refills: 0 | DISCHARGE

## 2021-05-26 RX ORDER — CEFUROXIME AXETIL 250 MG
1 TABLET ORAL
Qty: 4 | Refills: 0
Start: 2021-05-26 | End: 2021-05-27

## 2021-05-26 RX ORDER — BUPROPION HYDROCHLORIDE 150 MG/1
1 TABLET, EXTENDED RELEASE ORAL
Qty: 0 | Refills: 0 | DISCHARGE
Start: 2021-05-26

## 2021-05-26 RX ORDER — CITALOPRAM 10 MG/1
1 TABLET, FILM COATED ORAL
Qty: 0 | Refills: 0 | DISCHARGE

## 2021-05-26 RX ORDER — MIRTAZAPINE 45 MG/1
1 TABLET, ORALLY DISINTEGRATING ORAL
Qty: 0 | Refills: 0 | DISCHARGE
Start: 2021-05-26

## 2021-05-26 RX ORDER — CLONAZEPAM 1 MG
1 TABLET ORAL
Qty: 0 | Refills: 0 | DISCHARGE

## 2021-05-26 RX ORDER — THYROID 120 MG
1 TABLET ORAL
Qty: 0 | Refills: 0 | DISCHARGE
Start: 2021-05-26

## 2021-05-26 RX ORDER — BUPROPION HYDROCHLORIDE 150 MG/1
1 TABLET, EXTENDED RELEASE ORAL
Qty: 0 | Refills: 0 | DISCHARGE

## 2021-05-26 RX ADMIN — Medication 120 MILLIGRAM(S): at 05:29

## 2021-05-26 RX ADMIN — ARIPIPRAZOLE 5 MILLIGRAM(S): 15 TABLET ORAL at 09:54

## 2021-05-26 RX ADMIN — BUPROPION HYDROCHLORIDE 150 MILLIGRAM(S): 150 TABLET, EXTENDED RELEASE ORAL at 09:54

## 2021-05-26 RX ADMIN — Medication 0.25 MILLIGRAM(S): at 10:00

## 2021-05-26 RX ADMIN — Medication 1 APPLICATION(S): at 05:29

## 2021-05-26 RX ADMIN — CEFTRIAXONE 1000 MILLIGRAM(S): 500 INJECTION, POWDER, FOR SOLUTION INTRAMUSCULAR; INTRAVENOUS at 12:06

## 2021-05-26 RX ADMIN — Medication 25 MILLIGRAM(S): at 09:54

## 2021-05-26 RX ADMIN — HEPARIN SODIUM 5000 UNIT(S): 5000 INJECTION INTRAVENOUS; SUBCUTANEOUS at 12:05

## 2021-05-26 RX ADMIN — Medication 1 APPLICATION(S): at 12:08

## 2021-05-26 NOTE — DISCHARGE NOTE PROVIDER - NSDCMRMEDTOKEN_GEN_ALL_CORE_FT
ARIPiprazole 5 mg oral tablet: 1 tab(s) orally once a day  buPROPion 150 mg/24 hours (XL) oral tablet, extended release: 1 tab(s) orally once a day  cefuroxime 250 mg oral tablet: 1 tab(s) orally every 12 hours, start from 5/27  clonazePAM 0.25 mg oral tablet: 1 tab(s) orally 2 times a day  clotrimazole 1% topical cream: Apply topically to affected area as directed  ***[facility]- pt unable to provide history***  ezetimibe 10 mg oral tablet: 1 tab(s) orally once a day  ***[facility]- pt unable to provide history***  ketoconazole 2% topical cream: Apply topically to affected area as directed  ***[facility]- pt unable to provide history***  metoprolol succinate 25 mg oral tablet, extended release: 1 tab(s) orally 2 times a day  ***[facility]- pt unable to provide history***  mirtazapine 15 mg oral tablet: 1 tab(s) orally once a day (at bedtime)  thyroid desiccated 60 mg oral tablet: 2 tab(s) orally once a day  triamcinolone 0.1% topical cream: Apply topically to affected area as directed

## 2021-05-26 NOTE — PROGRESS NOTE BEHAVIORAL HEALTH - NSBHFUPINTERVALCCFT_PSY_A_CORE
"I am not feeling very good today; my legs hurt"
"I'm doing okay."
"I don't want to talk to psychiatrist"
"I don't want to talk to psychiatrist"

## 2021-05-26 NOTE — DISCHARGE NOTE NURSING/CASE MANAGEMENT/SOCIAL WORK - PATIENT PORTAL LINK FT
You can access the FollowMyHealth Patient Portal offered by [facility] by registering at the following website: [email or web address]. By joining [facility]’s FollowMyHealth portal, you will also be able to view your health information using other applications (apps) compatible with our system.

## 2021-05-26 NOTE — PROGRESS NOTE BEHAVIORAL HEALTH - NSBHFUPINTERVALHXFT_PSY_A_CORE
Today pt is brighter, pleasant, reports feeling better and I agreement with plan to go to rehab.   Pt states her mood is "better". Sleep and appetite are good. NO SI, HI, Ah, VH, no PI, No anxiety and agitation,.

## 2021-05-26 NOTE — PROGRESS NOTE BEHAVIORAL HEALTH - NSBHCHARTREVIEWVS_PSY_A_CORE FT
Vital Signs Last 24 Hrs  T(C): 36.6 (26 May 2021 07:55), Max: 36.7 (25 May 2021 15:56)  T(F): 97.8 (26 May 2021 07:55), Max: 98.1 (25 May 2021 15:56)  HR: 67 (26 May 2021 07:55) (67 - 83)  BP: 144/75 (26 May 2021 07:55) (126/60 - 144/75)  BP(mean): --  RR: 18 (26 May 2021 07:55) (17 - 18)  SpO2: 97% (26 May 2021 07:55) (94% - 97%)
Vital Signs Last 24 Hrs  T(C): 36.7 (22 May 2021 08:22), Max: 36.7 (22 May 2021 08:22)  T(F): 98 (22 May 2021 08:22), Max: 98 (22 May 2021 08:22)  HR: 80 (22 May 2021 08:22) (78 - 84)  BP: 133/61 (22 May 2021 08:22) (132/66 - 137/64)  BP(mean): --  ABP: --  ABP(mean): --  RR: 18 (22 May 2021 08:22) (18 - 18)  SpO2: 97% (22 May 2021 08:22) (97% - 97%)
Vital Signs Last 24 Hrs  T(C): 37.1 (24 May 2021 08:10), Max: 37.9 (23 May 2021 21:15)  T(F): 98.8 (24 May 2021 08:10), Max: 100.3 (23 May 2021 21:15)  HR: 84 (24 May 2021 08:10) (81 - 85)  BP: 122/62 (24 May 2021 08:10) (122/62 - 132/60)  BP(mean): --  RR: 18 (24 May 2021 08:10) (18 - 18)  SpO2: 98% (24 May 2021 08:10) (96% - 98%)

## 2021-05-26 NOTE — DISCHARGE NOTE PROVIDER - CARE PROVIDER_API CALL
behavioral health,   Phone: (   )    -  Fax: (   )    -  Follow Up Time:     primary doctor,   Phone: (   )    -  Fax: (   )    -  Follow Up Time:

## 2021-05-26 NOTE — DISCHARGE NOTE PROVIDER - NSDCCPCAREPLAN_GEN_ALL_CORE_FT
PRINCIPAL DISCHARGE DIAGNOSIS  Diagnosis: Altered mental status  Assessment and Plan of Treatment: you were treated for ecoli UTI, complete 2 more days of oral antibiotics in rehab  please follow up with your behavioural health doctor after discharge from rehab

## 2021-05-26 NOTE — PROGRESS NOTE BEHAVIORAL HEALTH - NSBHCHARTREVIEWINVESTIGATE_PSY_A_CORE FT
[facility] fomr 5/20/21 was nornmal and   QtC from 5/18/21 was 495
[facility] fomr 5/20/21 was nornmal and   QtC from 5/18/21 was 495
[facility]

## 2021-05-26 NOTE — PROGRESS NOTE BEHAVIORAL HEALTH - SECONDARY DX1
Dementia without behavioral disturbance, unspecified dementia type
Neurocognitive deficits
Neurocognitive deficits

## 2021-05-26 NOTE — PROGRESS NOTE BEHAVIORAL HEALTH - NSBHCONSULTFOLLOWAFTERCARE_PSY_A_CORE FT
pt can continue treatment with Dr Archer in [facility] and after that she can return to f/u with Dr Drake in [facility].
Pt can continue treatment with Dr Drake once d/yesy.

## 2021-05-26 NOTE — PROGRESS NOTE BEHAVIORAL HEALTH - PRIMARY DX
Recurrent major depressive disorder, in full remission
Delirium due to general medical condition
Delirium due to general medical condition
Recurrent major depressive disorder, in full remission

## 2021-05-26 NOTE — PROGRESS NOTE BEHAVIORAL HEALTH - SUMMARY
This is a 76 year-old, , female, with a history of Anxiety, Depression, no past psychiatric  hospitalization, no past suicidal ideation or suicide attempts who was admitted for further medical workup after being found wandering with altered mental status.     There is a polypharmacy: pt is on Celexa 40mg *with QtC 495, Remeron 15 mg, Wellbutrin XL 150mg , Abilify 5 mg and Klonopin 0.5mg po BID.   Will simplify regimen and d/c Celexa (QtC) and continue Wellbutrin, Abilify  and Remeron for now.   Lower Klonopin to 0.25 mg po BID because fo increased fall risk.   Psych will f/u.     Once d/yesy pt can continue treatment with Dr Drake.
76 year-old, history of Anxiety, Depression, no in-patient hospitalization, no suicidal ideation or suicide attempt, admitted for further medical workup after being found wandering.     Patient presenting with delirium superimposed on underlying Dementia. Anticipating improving with improved underlying medical condition. Of note, baseline is coherent, linear, organized, and current presenting is an acute change in mental status.    PLAN  MEDICATIONS  (STANDING):  ARIPiprazole 5 milliGRAM(s) Oral daily  buPROPion XL (24-Hour) . 150 milliGRAM(s) Oral daily (reduced)  citalopram 30 milliGRAM(s) Oral daily (reduced)  mirtazapine 15 milliGRAM(s) Oral at bedtime  clonazePAM  Tablet 0.5 milliGRAM(s) twice daily (changed from standing)      5/22/21: Patient is AAOx3 today, improved from previous evaluation. Labwork is relatively WNL with elevated B12 and Low TSH. Patient is set to be discharged to rehab on Monday. She is more oriented today, with logical thought process and ability to have a conversation. Denies suicidal or homicidal ideations, intent or plan. Denies any psychotic symptoms including A/V/T hallucinations. Denies manic symptoms. Reports sleeping well and denies any acute distress at this time. She says that she lives alone.
This is a 76 year-old, , female, with a history of Anxiety, Depression, no past psychiatric  hospitalization, no past suicidal ideation or suicide attempts who was admitted for further medical workup after being found wandering with altered mental status.     Met with patient at bedtime today for a follow-up reassessment. She is fully alert and oriented X 3; had trouble recalling the current year; states the year is 2001 instead of 2021. She was able to state where she is, but she is unable to recall the circumstances leading to this hospitalization. She spoke about falling in her garage for the second time. She denies current suicidal or homicidal ideations; she denies current intent or plans to die. She appears mildly irritable and easily distracted. She will benefit from reassessment prior to discharge.    PLAN: May continue current medication regimen as prescribed.
This is a 76 year-old, , female, with a history of Anxiety, Depression, no past psychiatric  hospitalization, no past suicidal ideation or suicide attempts who was admitted for further medical workup after being found wandering with altered mental status.     Pt improved, [facility]     Continue current meds:  ARIPiprazole 5 milliGRAM(s) Oral daily  buPROPion XL (24-Hour) . 150 milliGRAM(s) Oral daily  clonazePAM  Tablet 0.25 milliGRAM(s) Oral two times a day  mirtazapine 15 milliGRAM(s) Oral at bedtime    pt can continue treatment with Dr Archer in [facility] and after that she can return to f/u with Dr Drake in [facility].

## 2021-05-26 NOTE — PROGRESS NOTE BEHAVIORAL HEALTH - RISK ASSESSMENT
Low acute risk for suicide: NO SI, no agitation, no anxiety ./ panic. no insomnia, no depression, no hx.   Increased long term risk as pt is aging, elderly, with increased medical co-morbidity, with hx fo depression dn anxiety.   Protective factors: Pt wants to live, and engaged treatment with [facility] psychiatrist, takes meds  Mitigation of risks: continue to see outpatient provider and take her meds
Low risk for suicide
Low acute risk for suicide: NO SI, no agitation, no anxiety ./ panic. no insomnia, no depression, no hx.   Increased long term risk as pt is aging, elderly, with increased medical co-morbidity, with hx fo depression dn anxiety.   Protective factors: Pt wants to live, and engaged treatment with [facility] psychiatrist, takes meds  Mitigation of risks: continue to see outpatient provider and take her meds

## 2021-05-26 NOTE — DISCHARGE NOTE PROVIDER - HOSPITAL COURSE
76 year old female patient with reported history of dementia who currently lives alone presented to the ED after found to be confused by her neighbors.    # Dementia with worsening confusion and hallucinations - likely metabolic encephalopathy from UTI  -Neuro eval noted-  CT head shows no acute CVA, normal EEG, no seizure activity. - no further neuro w/u recommended per neuro    -Psych eval noted - Psych modified meds - cw with abilify, wellbutrin , remeron, decreased klonopin dose   - consult appreciated, will need to be re-evaluated prior to discharge for further safety assessment. Will clarify medication prescriber for f/u. Nephew mentioned Dr. Drake was previous prescriber.     #dysuria/urinary frequency - Ecoli UTI sensitive to ceftriaxone   T max: 100.3 [id number] afebrile.   s/p 3 days ceftriaxone, continue ceftin for 2 more days 250BID  - no evidence of sepsis     -Vit B12, Folate: normal  -NH4 level normal  -CT head negative for acute pathology  - EEG negative     #MDD / Anxiety/ neurocognitive deficits  - D/c Celexa 40 mg as per psych   - continue Wellbutrin 150 mg, Abilify 5 mg, and Remeron 15 mg  - decrease Klonopin to 0.25 mg BID because of fall risk  - Will f/u with psych Dr. Drake   - no psych c/i to discharge.     #Hypothyroidism  -tsh noted to be low  decreased Thyroid Armor from 135mg to 120mg/day  repeat TFTs as outpatient     # HTN  -on metoprolol    dispo- amedically stable for discharge to rehab    I  juaquinloanshravan nephew rachel           76 year old female patient with reported history of dementia who currently lives alone presented to the ED after found to be confused by her neighbors. Patient noted to be alert but not responsive to questions asked by EMS at the time of their evaluation. Patient alert but notably confused    5.19: +confused, staring in space at tiimes, takes a long time to answer simple questions  5.20: more fluent today but still confused; disoriented to situation, oriented to her name and place   5.21: patient is confused, +hallucinations at night, oriented to name and place only; doesnt' make sense, not coherent   5/22: Pt. is confused but today is oriented to name, place, and time. Pt. states she feels like her "bladder is full".   5/22: Pt. is A&Ox2: She states that she is having dysuria, urinary frequency, and some urinary incontinence when she sits up. No other acute complaints.   5/24: Pt. has been increasingly more confused and guarded today. Pt. is complaining of lower abdominal pain, urinary frequency, and dysuria. UA positive for UTI. No other acute complaints   5/25 less confused today ,   5/26- walked with PT , pt more alert     afebrile     PHYSICAL EXAM:  GEN: lying in bed, NAD  HEENT:    pupils equal and reactive, EOMI  CV:  +S1, +S2, RRR  RESP:   lungs clear to auscultation bilaterally, no wheeze, rales, rhonchi   BREAST:  not examined  GI: abdomen soft, non-tender, non-distended, normoactive BS    EXT:  no edema  NEURO:  AAOX2 no focal neurological deficits      discharge time 47 mins 76 year old female patient with reported history of dementia who currently lives alone presented to the ED after found to be confused by her neighbors.    # Dementia with worsening confusion and hallucinations - likely metabolic encephalopathy from UTI  -Neuro eval noted-  CT head shows no acute CVA, normal EEG, no seizure activity. - no further neuro w/u recommended per neuro    -Psych eval noted - Psych modified meds - cw with abilify, wellbutrin , remeron, decreased klonopin dose   - consult appreciated, will need to be re-evaluated prior to discharge for further safety assessment. Will clarify medication prescriber for f/u. Nephew mentioned Dr. Drake was previous prescriber.     #dysuria/urinary frequency - Ecoli UTI sensitive to ceftriaxone   T max: 100.3 [id number] afebrile.   s/p 3 days ceftriaxone, continue ceftin for 2 more days 250BID  - no evidence of sepsis     -Vit B12, Folate: normal  -NH4 level normal  -CT head negative for acute pathology  - EEG negative     #MDD / Anxiety/ neurocognitive deficits  - D/c Celexa 40 mg as per psych   - continue Wellbutrin 150 mg, Abilify 5 mg, and Remeron 15 mg  - decrease Klonopin to 0.25 mg BID because of fall risk  - Will f/u with psych Dr. Drake   - no psych c/i to discharge.     #Hypothyroidism  -tsh noted to be low  decreased Thyroid Armor from 135mg to 120mg/day  repeat TFTs as outpatient     # HTN  -on metoprolol    dispo- medically stable for discharge to rehab    I  kamar nephew rachel           76 year old female patient with reported history of dementia who currently lives alone presented to the ED after found to be confused by her neighbors. Patient noted to be alert but not responsive to questions asked by EMS at the time of their evaluation. Patient alert but notably confused    5.19: +confused, staring in space at tiimes, takes a long time to answer simple questions  5.20: more fluent today but still confused; disoriented to situation, oriented to her name and place   5.21: patient is confused, +hallucinations at night, oriented to name and place only; doesnt' make sense, not coherent   5/22: Pt. is confused but today is oriented to name, place, and time. Pt. states she feels like her "bladder is full".   5/22: Pt. is A&Ox2: She states that she is having dysuria, urinary frequency, and some urinary incontinence when she sits up. No other acute complaints.   5/24: Pt. has been increasingly more confused and guarded today. Pt. is complaining of lower abdominal pain, urinary frequency, and dysuria. UA positive for UTI. No other acute complaints   5/25 less confused today ,   5/26- walked with PT , pt more alert     afebrile     PHYSICAL EXAM:  GEN: lying in bed, NAD  HEENT:    pupils equal and reactive, EOMI  CV:  +S1, +S2, RRR  RESP:   lungs clear to auscultation bilaterally, no wheeze, rales, rhonchi   BREAST:  not examined  GI: abdomen soft, non-tender, non-distended, normoactive BS    EXT:  no edema  NEURO:  AAOX2 no focal neurological deficits      discharge time 47 mins 76 year old female patient with reported history of dementia who currently lives alone presented to the ED after found to be confused by her neighbors.    # Dementia with worsening confusion and hallucinations - likely metabolic encephalopathy from UTI  -Neuro eval noted-  CT head shows no acute CVA, normal EEG, no seizure activity. - no further neuro w/u recommended per neuro    -Psych eval noted - Psych modified meds - cw with abilify, wellbutrin , remeron, decreased klonopin dose   - consult appreciated, will need to be re-evaluated prior to discharge for further safety assessment. Will clarify medication prescriber for f/u. Nephtoney mentioned Dr. Drake was previous prescriber.     #dysuria/urinary frequency - Ecoli UTI sensitive to ceftriaxone   T max: 100.3 [id number] afebrile.   s/p 3 days ceftriaxone, continue ceftin for 2 more days 250BID  - no evidence of sepsis     -Vit B12, Folate: normal  -NH4 level normal  -CT head negative for acute pathology  - EEG negative     #MDD / Anxiety/ neurocognitive deficits  - D/c Celexa 40 mg as per psych   - continue Wellbutrin 150 mg, Abilify 5 mg, and Remeron 15 mg  - decrease Klonopin to 0.25 mg BID because of fall risk  - Will f/u with psych Dr. Drake   - no psych c/i to discharge.     #Hypothyroidism  -tsh noted to be low  decreased Thyroid Armor from 135mg to 120mg/day  repeat TFTs as outpatient     # HTN  -on metoprolol    dispo- medically stable for discharge to rehab    I dw with emmanuel ramos   discharge time 47mins          76 year old female patient with reported history of dementia who currently lives alone presented to the ED after found to be confused by her neighbors. Patient noted to be alert but not responsive to questions asked by EMS at the time of their evaluation. Patient alert but notably confused    5.19: +confused, staring in space at tiimes, takes a long time to answer simple questions  5.20: more fluent today but still confused; disoriented to situation, oriented to her name and place   5.21: patient is confused, +hallucinations at night, oriented to name and place only; doesnt' make sense, not coherent   5/22: Pt. is confused but today is oriented to name, place, and time. Pt. states she feels like her "bladder is full".   5/22: Pt. is A&Ox2: She states that she is having dysuria, urinary frequency, and some urinary incontinence when she sits up. No other acute complaints.   5/24: Pt. has been increasingly more confused and guarded today. Pt. is complaining of lower abdominal pain, urinary frequency, and dysuria. UA positive for UTI. No other acute complaints   5/25 less confused today ,   5/26- walked with PT , pt more alert     afebrile     PHYSICAL EXAM:  GEN: lying in bed, NAD  HEENT:    pupils equal and reactive, EOMI  CV:  +S1, +S2, RRR  RESP:   lungs clear to auscultation bilaterally, no wheeze, rales, rhonchi   BREAST:  not examined  GI: abdomen soft, non-tender, non-distended, normoactive BS    EXT:  no edema  NEURO:  AAOX2 no focal neurological deficits      discharge time 47 mins

## 2021-05-26 NOTE — PROGRESS NOTE BEHAVIORAL HEALTH - NSBHCONSFOLLOWNEEDS_PSY_A_CORE
Patient needs further psychiatric safety assessment prior to discharge
Patient needs further psychiatric safety assessment prior to discharge
no psychiatric contraindications to discharge
no psychiatric contraindications to discharge

## 2021-05-26 NOTE — DISCHARGE NOTE PROVIDER - PROVIDER TOKENS
FREE:[LAST:[behavioral health],PHONE:[(   )    -],FAX:[(   )    -]],FREE:[LAST:[primary doctor],PHONE:[(   )    -],FAX:[(   )    -]]

## 2021-06-03 DIAGNOSIS — B96.20 UNSPECIFIED ESCHERICHIA COLI [E. COLI] AS THE CAUSE OF DISEASES CLASSIFIED ELSEWHERE: ICD-10-CM

## 2021-06-03 DIAGNOSIS — F41.9 ANXIETY DISORDER, UNSPECIFIED: ICD-10-CM

## 2021-06-03 DIAGNOSIS — F03.91 UNSPECIFIED DEMENTIA WITH BEHAVIORAL DISTURBANCE: ICD-10-CM

## 2021-06-03 DIAGNOSIS — F05 DELIRIUM DUE TO KNOWN PHYSIOLOGICAL CONDITION: ICD-10-CM

## 2021-06-03 DIAGNOSIS — R29.818 OTHER SYMPTOMS AND SIGNS INVOLVING THE NERVOUS SYSTEM: ICD-10-CM

## 2021-06-03 DIAGNOSIS — E03.9 HYPOTHYROIDISM, UNSPECIFIED: ICD-10-CM

## 2021-06-03 DIAGNOSIS — E78.5 HYPERLIPIDEMIA, UNSPECIFIED: ICD-10-CM

## 2021-06-03 DIAGNOSIS — R44.3 HALLUCINATIONS, UNSPECIFIED: ICD-10-CM

## 2021-06-03 DIAGNOSIS — F33.42 MAJOR DEPRESSIVE DISORDER, RECURRENT, IN FULL REMISSION: ICD-10-CM

## 2021-06-03 DIAGNOSIS — I10 ESSENTIAL (PRIMARY) HYPERTENSION: ICD-10-CM

## 2021-06-03 DIAGNOSIS — Z88.0 ALLERGY STATUS TO PENICILLIN: ICD-10-CM

## 2021-06-03 DIAGNOSIS — Z88.5 ALLERGY STATUS TO NARCOTIC AGENT: ICD-10-CM

## 2021-06-03 DIAGNOSIS — N39.0 URINARY TRACT INFECTION, SITE NOT SPECIFIED: ICD-10-CM

## 2021-06-03 DIAGNOSIS — G93.41 METABOLIC ENCEPHALOPATHY: ICD-10-CM

## 2021-06-03 DIAGNOSIS — N17.9 ACUTE KIDNEY FAILURE, UNSPECIFIED: ICD-10-CM

## 2021-06-03 DIAGNOSIS — Z88.8 ALLERGY STATUS TO OTHER DRUGS, MEDICAMENTS AND BIOLOGICAL SUBSTANCES: ICD-10-CM

## 2021-06-18 ENCOUNTER — APPOINTMENT (OUTPATIENT)
Dept: FAMILY MEDICINE | Facility: CLINIC | Age: 76
End: 2021-06-18
Payer: MEDICARE

## 2021-06-18 VITALS
HEART RATE: 70 BPM | DIASTOLIC BLOOD PRESSURE: 55 MMHG | TEMPERATURE: 96.3 F | OXYGEN SATURATION: 97 % | SYSTOLIC BLOOD PRESSURE: 120 MMHG | WEIGHT: 170 LBS | BODY MASS INDEX: 33.38 KG/M2 | HEIGHT: 60 IN

## 2021-06-18 DIAGNOSIS — M25.562 PAIN IN LEFT KNEE: ICD-10-CM

## 2021-06-18 DIAGNOSIS — G89.29 PAIN IN LEFT KNEE: ICD-10-CM

## 2021-06-18 PROCEDURE — 99214 OFFICE O/P EST MOD 30 MIN: CPT

## 2021-06-18 NOTE — HISTORY OF PRESENT ILLNESS
[FreeTextEntry1] : KITTY SHELBY is a 76 year old female here for a follow up visit.\par  [de-identified] : She is here to follow up after hospitalization. She was hospitalized on 5/27 after a fall. This was her third fall in short period of time. The first time she injured her left knee when she tripped and fell in the garage. The second time she tripped again on a ramp into her garage, landing on her hands and bumping her head. The third time she does not know what happened. Her neighbor found her disoriented, lying on the floor of her house. She has no idea how long she was on the floor (she states that her mail was piling up so it sounds like days). This time she again injured her knee. She does not know if she hit her head. She was hospitalized and then went to [facility] on 5/26. She was discharged from [facility] on Monday. She saw her cardiologist (Dr. Olivares) on Tuesday. He did not change any of her medications.\par \par She currently complains of knee pain which she is treating with Tylenol and a topical pain reliever (Dynarub). She also has bilateral lower extremity edema which Dr. Olivares is treating with Lasix and compression stockings. He told her that her heart is strong and does not that that she fell due to cardiac reason. She saw Dr. Nowak for her knee pain and he recommended an MRI of her left knee but he did not order this. She requests that I order the MRI today.

## 2021-06-18 NOTE — REVIEW OF SYSTEMS
[Lower Ext Edema] : lower extremity edema [Fever] : no fever [Chills] : no chills [Vision Problems] : no vision problems [Hearing Loss] : no hearing loss [Chest Pain] : no chest pain [Palpitations] : no palpitations [Shortness Of Breath] : no shortness of breath [Abdominal Pain] : no abdominal pain [Dysuria] : no dysuria [Hematuria] : no hematuria [Skin Rash] : no skin rash [Dizziness] : no dizziness [Anxiety] : no anxiety [Depression] : no depression [FreeTextEntry9] : left knee pain and swelling

## 2021-06-18 NOTE — PLAN
[FreeTextEntry1] : I agreed to order the MRI of her knee. She would like to go to [facility] and request a paper Rx for this today. Will attempt to get hospital records to better understand the workup she had while hospitalized. Will also request records from her cardiologist which may shed some light on the reason for her fall. At this time I do not feel she is unsafe to be a home. She states that her son checks on her regularly and she is using a walker for ambulation which should reduce the risk of future falls.

## 2021-06-18 NOTE — ASSESSMENT
[FreeTextEntry1] : She is here today for hospital follow up. Unfortunately she did not bring any hospital records with her and the discharge paperwork from [facility] is limited. We reviewed her medications today and based upon her list from home nothing has changed. \par \par \par She has had 3 falls in the recent past (she cannot even quantify exactly what time period) and the first two at least sound like mechanical trip and fall incidents. The reason for the third fall is less clear and she is also unclear about how long a time passed between when she fell and when her neighbor found her. The patient does take a number of psychiatric medications which could be affecting her memory of the event. She saw her cardiologist after discharge from rehab and was told that her heart was fine so presumably she was not felt to have a cardiac event as the cause of her fall.\par \par Her only request today is for an MRI of her left knee to evaluate her continued pain and swelling.

## 2021-06-18 NOTE — PHYSICAL EXAM
[Normal] : normal rate, regular rhythm, normal S1 and S2 and no murmur heard [No Carotid Bruits] : no carotid bruits [Non Tender] : non-tender [Soft] : abdomen soft [Grossly Normal Strength/Tone] : grossly normal strength/tone [No Focal Deficits] : no focal deficits [Normal Affect] : the affect was normal [de-identified] : compression stockings on lower legs bilaterally [de-identified] : left knee swelling [de-identified] : walking with a walker

## 2021-06-29 ENCOUNTER — NON-APPOINTMENT (OUTPATIENT)
Age: 76
End: 2021-06-29

## 2021-07-21 NOTE — ED ADULT NURSE NOTE - PAIN RATING/NUMBER SCALE (0-10): ACTIVITY
Caller: Rachel Albaniaalejandra Walters    Relationship: Self    Best call back number: [phone]    What medication are you requesting: WATER PILL    What are your current symptoms: SWELLING OF ANKLES      How long have you been experiencing symptoms: A COUPLE DAYS     Have you had these symptoms before:    [x] Yes  [] No    Have you been treated for these symptoms before:   [x] Yes  [] No    If a prescription is needed, what is your preferred pharmacy and phone number: [facility] [place] [address] [address] [facility] - [phone] [facility] [phone] FX     Additional notes: PATIENT STATED THAT SHE WOULD LIKE TO GET A REFILL ON HER WATER PILL MEDICATION HOWEVER IT IS ON NOT HER LIST    PLEASE ADVISE         0

## 2022-02-24 ENCOUNTER — LABORATORY RESULT (OUTPATIENT)
Age: 77
End: 2022-02-24

## 2022-02-24 ENCOUNTER — APPOINTMENT (OUTPATIENT)
Dept: FAMILY MEDICINE | Facility: CLINIC | Age: 77
End: 2022-02-24
Payer: MEDICARE

## 2022-02-24 VITALS
OXYGEN SATURATION: 97 % | TEMPERATURE: 97.3 F | DIASTOLIC BLOOD PRESSURE: 76 MMHG | SYSTOLIC BLOOD PRESSURE: 130 MMHG | HEART RATE: 74 BPM | HEIGHT: 60 IN | BODY MASS INDEX: 36.12 KG/M2 | WEIGHT: 184 LBS

## 2022-02-24 DIAGNOSIS — R60.0 LOCALIZED EDEMA: ICD-10-CM

## 2022-02-24 DIAGNOSIS — E03.8 OTHER SPECIFIED HYPOTHYROIDISM: ICD-10-CM

## 2022-02-24 DIAGNOSIS — Z86.79 PERSONAL HISTORY OF OTHER DISEASES OF THE CIRCULATORY SYSTEM: ICD-10-CM

## 2022-02-24 DIAGNOSIS — K59.00 CONSTIPATION, UNSPECIFIED: ICD-10-CM

## 2022-02-24 PROCEDURE — 36415 COLL VENOUS BLD VENIPUNCTURE: CPT

## 2022-02-24 PROCEDURE — 99214 OFFICE O/P EST MOD 30 MIN: CPT | Mod: 25

## 2022-02-24 NOTE — HISTORY OF PRESENT ILLNESS
[FreeTextEntry1] : here for f/u thyroid [de-identified] : this is a 77yo pmhx htn/ hld, hx afib now resolved, cardiology Dr. Olivares/ major depression patient of Dr. Drake/ hypothyroid here for f/u thyroid BW and med renewals.\par last seen 06/21 post hospital f/u for fall, had LE swelling and knee pain, now resolved and patient is ambulating with use of cane, reports 1 fall since f/u with no injury "months ago", tripped on item, denies injuries/concerns, reports she has been ambulating with assistance and device sine then, denies falls since then and doing well today.\par o/w in no acute distress, no other major concerns and reports feeling well. \par will evaluate and manage as appropriate.

## 2022-02-24 NOTE — COUNSELING
[Fall prevention counseling provided] : Fall prevention counseling provided [Adequate lighting] : Adequate lighting [No throw rugs] : No throw rugs [Use proper foot wear] : Use proper foot wear [Use recommended devices] : Use recommended devices [Behavioral health counseling provided] : Behavioral health counseling provided [Sleep ___ hours/day] : Sleep [unfilled] hours/day [Engage in a relaxing activity] : Engage in a relaxing activity [Plan in advance] : Plan in advance [de-identified] : Maintain balanced diet of whole grain, fruits and vegetables, lean meats, skinless poultry, fish, beans, soy products, eggs, nuts, and low fat dairy as per FDA dietary guidelines. \par Avoid high calorie/low nutrient foods. \par vegetables/fruits- at least 5 servings/day, \par whole grains- 100% whole grain and at least 3 grams fiber/day.\par reduce/eliminate saturated fat- less than 5% on nutrition labels (ex. mahan, deli meat, hot dogs, fried foods, cookies, ice cream, chips, soft drinks, etc.)\par stay within your FDA recommended daily calorie needs or use the plate method to portion intake. \par Avoid fast food and limit eating out. When eating out choose healthy alternatives (ex. grilled, baked, steamed. low fat dressings. avoid french fries).\par DO NOT CONSUME FOODS YOU ARE ALLERGIC TO DESPITE DIETARY RECOMMENDATIONS.\par \par For more information you can visit www.Health.gov \par \par Incorporate regular physical activity most days of the week. \par Regular aerobic activity- moderate intensity, most days/wk, at least 30min/day- ex. brisk walk 2.5miles/hr, ballroom dancing, water aerobics, light yard work (raking/lawn mowing) actively playing basketball, biking 10miles/hr.\par Muscle strengthening and strength/resistance exercises 2-3days/wk- ex. free weights, resistance bands, your own weight (squats/pull-ups/push-ups).\par Neuro-motor exercises for balance/agility/coordination and flexibility exercises 2-3days/wk, 20-30min/day- ex. yoga and devonte-chi.\par Bone strengthening at least 30min/day, most days of the week- ex. weights, resistance bands, running, brisk walking, jumping rope, stair climbing, tennis.\par Decrease sedentary time. \par LISTEN TO YOUR BODY AND MODIFY ACTIVITY AS NEEDED- DO NOT OVEREXERT YOURSELF DURING PHYSICAL ACTIVITY DESPITE RECOMMENDATION.\par \par For more information you can visit www.Health.gov \par \par Depression/Anxiety are mood disorders. The symptoms of depression/anxiety can affect how you think and feel and how you handle every day activities (work, eating, sleeping). Multiple treatments are available such as psychotherapy/counseling, medications called antidepressants or anxiolytics, or other therapies. \par Some medications can take 2-4 weeks to work and can have side effects; notify our office if you experience any side effects. Suicidal thoughts or attempt may occur in some people, especially if agitated when first initiating medication, before it begins to work; if suicidal thoughts/ideations or suicidal attempt- call 911 for emergency services/go to the nearest emergency department. \par IF IN CRISIS YOU HAVE SUPPORT= CALL 911/EMERGENCY SERVICES, CALL [facility] [phone], CALL OUR OFFICE. \par \par you are a fall risk, falls can cause serious injury and complications- encourage slow positional changes, do not walk/run unattended, walk with assistive devices as appropriate, do not drive; patient should be driven if needed. notify/return to office if worsening/persistent symptoms or new onset complaints/concerns, in the event of any emergency or life threatening condition, go to the nearest emergency department and then notify office.  [None] : None [Good understanding] : Patient has a good understanding of lifestyle changes and steps needed to achieve self management goal

## 2022-02-24 NOTE — PHYSICAL EXAM
[No Acute Distress] : no acute distress [Well Nourished] : well nourished [Well Developed] : well developed [PERRL] : pupils equal round and reactive to light [No Respiratory Distress] : no respiratory distress  [No Accessory Muscle Use] : no accessory muscle use [Clear to Auscultation] : lungs were clear to auscultation bilaterally [No Carotid Bruits] : no carotid bruits [No Edema] : there was no peripheral edema [Normal Posterior Cervical Nodes] : no posterior cervical lymphadenopathy [Normal Anterior Cervical Nodes] : no anterior cervical lymphadenopathy [No CVA Tenderness] : no CVA  tenderness [No Rash] : no rash [Coordination Grossly Intact] : coordination grossly intact [Normal Gait] : normal gait [Normal Mental Status] : the patient's orientation, memory, attention, language and fund of knowledge were normal [Appropriate] : appropriate [Impaired judgment] : intact judgment [Impaired Insight] : intact insight [Normal Rate] : a normal rate [Normal Rhythm] : a normal rhythm [Normal Tone] : normal tone [Normal Volume] : normal volume [Normal] : normal throught processes [Normal Associations] :  no deficiency [Hallucinations] : exhibited no hallucinations [Delusions] : exhibited no delusions [Obsessions] : denied obsessions [Preoccupation with Violence] : denied preoccupation with violent thoughts [Suicidal Ideation] : denied suicidal ideation [Suicidal Intent] : denied suicidal intent [Suicidal Plan] : denied suicidal plans [Homicidal Ideation] : denied homicidal ideation [Homicidal Intent] : denied homicidal intention [Homicidal Plan] : denied homicidal plans

## 2022-02-24 NOTE — REVIEW OF SYSTEMS
[Abdominal Pain] : no abdominal pain [Nausea] : no nausea [Constipation] : constipation [Diarrhea] : diarrhea [Vomiting] : no vomiting [Heartburn] : no heartburn [Melena] : no melena [Skin Rash] : no skin rash [Negative] : Heme/Lymph [FreeTextEntry7] : reports chronic hx of and previous on Linzess and well managed, since discontinued, reports 1 BM every 2 weeks [FreeTextEntry8] : hx neurogenic bladder [de-identified] : well managed

## 2022-02-24 NOTE — ASSESSMENT
[FreeTextEntry1] : hypothyroid\par on [place] thyroid\par check thyroid - "labs drawn in office" \par in no acute distress and o/w offers no complaints \par has medication for now \par \par constipation\par counseled on OTC mgmt for interim/ conservative measures\par f/u with Gastro Dr. Lam for possible restart of Linzess and colonoscopy \par advised notify office if worsening/persistent symptoms or new onset complaints/concerns, in the event of any emergency or life threatening condition, go to the nearest emergency department and then notify office. \par patient verbalized understanding and agrees with plan of care. \par \par htn/ hld, hx afib now resolved\par BP well managed today\par she is on metoprolol, low dose Asa, ezetimibe, reports no longer on Lasix or potassium\par cardiology Dr. Olivares, reports last ECG no afib or cute changes, next f/u 06/22- obtaining reports\par in no acute distress and o/w offers no complaints \par \par hx major depression\par psychiatry Dr. Drake\par well managed \par on Konopin prn, Celexa, and Wellbutrin, Abilify, Remeron HS\par follows up monthly\par in no acute distress and o/w offers no complaints \par \par hx LE edema\par manages conservatively\par no swelling today \par sees Dr. Carrillo vascular prn\par in no acute distress and o/w offers no complaints \par \par preventative health\par annual wellness- scheduling \par shingles- had Zostavax, u/k date\par colonoscopy- hx diverticulosis, scheduling rpt with Dr. Hayward, following up constipation \par ophthalmology dilated eye exam- Dr. Crocker\par GYN for Pap- 2021, nml, Dr. Ottoniel Douglas\par mammo- 2021, nml\par DEXA- 12/18 osteopenia, scheduling \par Dr. Zambrano derm\par podiatry Dr. Wick \par continued follow up with PCP for chronic concerns and preventative health management. \par patient verbalized understanding and agrees with plan of care. \par \par

## 2022-02-24 NOTE — CURRENT MEDS
[Takes medication as prescribed] : takes [None] : Patient does not have any barriers to medication adherence [Yes] : Reviewed medication list for presence of high-risk medications. [Benzodiazepines] : benzodiazepines [FreeTextEntry1] : under mgmt psychiatry, counseled on medications

## 2022-02-24 NOTE — HEALTH RISK ASSESSMENT
[Never] : Never [No] : In the past 12 months have you used drugs other than those required for medical reasons? No [No falls in past year] : Patient reported no falls in the past year [0] : 2) Feeling down, depressed, or hopeless: Not at all (0) [PHQ-2 Negative - No further assessment needed] : PHQ-2 Negative - No further assessment needed [Patient/Caregiver not ready to engage] : , patient/caregiver not ready to engage [de-identified] : trying to be more active [de-identified] : well balanced low fat/ low chol  [[id number]] : 0 [AdvancecareDate] : 02/22

## 2022-02-25 ENCOUNTER — NON-APPOINTMENT (OUTPATIENT)
Age: 77
End: 2022-02-25

## 2022-02-25 LAB
ALBUMIN SERPL ELPH-MCNC: 4.4 G/DL
ALP BLD-CCNC: 96 U/L
ALT SERPL-CCNC: 49 U/L
ANION GAP SERPL CALC-SCNC: 12 MMOL/L
AST SERPL-CCNC: 32 U/L
BILIRUB SERPL-MCNC: <0.2 MG/DL
BUN SERPL-MCNC: 38 MG/DL
CALCIUM SERPL-MCNC: 9.4 MG/DL
CHLORIDE SERPL-SCNC: 108 MMOL/L
CO2 SERPL-SCNC: 20 MMOL/L
CREAT SERPL-MCNC: 0.75 MG/DL
GLUCOSE SERPL-MCNC: 115 MG/DL
POTASSIUM SERPL-SCNC: 4.8 MMOL/L
PROT SERPL-MCNC: 7.1 G/DL
SODIUM SERPL-SCNC: 140 MMOL/L
T3FREE SERPL-MCNC: 2.03 PG/ML
T3RU NFR SERPL: 1.1 TBI
T4 FREE SERPL-MCNC: 0.7 NG/DL
TSH SERPL-ACNC: 2.22 UIU/ML

## 2022-03-01 ENCOUNTER — NON-APPOINTMENT (OUTPATIENT)
Age: 77
End: 2022-03-01

## 2022-04-18 ENCOUNTER — FORM ENCOUNTER (OUTPATIENT)
Age: 77
End: 2022-04-18

## 2022-05-15 ENCOUNTER — FORM ENCOUNTER (OUTPATIENT)
Age: 77
End: 2022-05-15

## 2022-08-09 ENCOUNTER — APPOINTMENT (OUTPATIENT)
Dept: FAMILY MEDICINE | Facility: CLINIC | Age: 77
End: 2022-08-09

## 2022-08-09 VITALS — SYSTOLIC BLOOD PRESSURE: 136 MMHG | DIASTOLIC BLOOD PRESSURE: 80 MMHG

## 2022-08-09 VITALS
BODY MASS INDEX: 35.34 KG/M2 | HEIGHT: 60 IN | SYSTOLIC BLOOD PRESSURE: 142 MMHG | HEART RATE: 68 BPM | DIASTOLIC BLOOD PRESSURE: 82 MMHG | TEMPERATURE: 97 F | WEIGHT: 180 LBS | OXYGEN SATURATION: 96 %

## 2022-08-09 DIAGNOSIS — Z00.00 ENCOUNTER FOR GENERAL ADULT MEDICAL EXAMINATION W/OUT ABNORMAL FINDINGS: ICD-10-CM

## 2022-08-09 DIAGNOSIS — J30.89 OTHER ALLERGIC RHINITIS: ICD-10-CM

## 2022-08-09 PROCEDURE — G0439: CPT

## 2022-08-09 PROCEDURE — 36415 COLL VENOUS BLD VENIPUNCTURE: CPT

## 2022-08-09 RX ORDER — ARIPIPRAZOLE 2 MG/1
2 TABLET ORAL DAILY
Refills: 0 | Status: DISCONTINUED | COMMUNITY
Start: 2021-05-19 | End: 2022-08-09

## 2022-08-09 RX ORDER — TOBRAMYCIN AND DEXAMETHASONE 3; 1 MG/ML; MG/ML
0.3-0.1 SUSPENSION/ DROPS OPHTHALMIC
Qty: 5 | Refills: 0 | Status: DISCONTINUED | COMMUNITY
Start: 2022-05-09

## 2022-08-09 RX ORDER — AZELASTINE HYDROCHLORIDE 137 UG/1
137 SPRAY, METERED NASAL TWICE DAILY
Qty: 1 | Refills: 11 | Status: ACTIVE | COMMUNITY
Start: 2021-05-19 | End: 1900-01-01

## 2022-08-09 RX ORDER — ARIPIPRAZOLE 5 MG/1
5 TABLET ORAL
Qty: 90 | Refills: 0 | Status: ACTIVE | COMMUNITY
Start: 2022-06-30

## 2022-08-09 NOTE — PLAN
[FreeTextEntry1] : Continue all medications as prescribed. Check labs as above. Will adjust any medications based upon lab results. If her cholesterol is elevated she requests 3 months to work on diet and exercise before increasing medication.\par \par Reviewed age-appropriate preventive screening tests with patient.\par \par Discussed clean eating (e.g. Mediterranean style diet) and recommendations for regular exercise/staying as physically active as possible.\par \par Reviewed importance of good self care (e.g. meditation, yoga, adequate rest, regular exercise, magnesium, clean eating, etc.).\par \par Follow up for next physical in one year.

## 2022-08-09 NOTE — HISTORY OF PRESENT ILLNESS
[FreeTextEntry1] : KITTY SHELBY is a 77 year old female here for a physical exam.  [de-identified] : Her last physical exam was 2/2020\par \par Vaccines:\par Tetanus is up to date\par Pneumococcal vaccination is up to date\par Shingrix is up to date (10/2018, 12/2018)\par COVID vaccine is up to date\par \par Her last dentist visit was less than one year ago\par Her last eye doctor appointment was less than one year ago\par Her last dermatologist visit was less than one year ago\par \par GYN visit is up to date (April 25,2022, Dr. Douglas); \par Mammogram is up to date; scheduled for 8/10/2022\par Colon cancer screening is no longer indicated due to her age (last colonoscopy was 12/2016; Cologuard negative in 2022)\par DEXA is up to date (last was 12/10/2018, T-scores: L-spine -0.6, R fem neck- 2.3); scheduled for 8/10/2022\par \par Her diet is healthy overall\par Exercise: cycling, walking

## 2022-08-09 NOTE — ASSESSMENT
[FreeTextEntry1] : KITTY SHELBY is a 77 year old female here for a physical exam.\par \par Patient has a history of gastro-esophageal reflux disease, hypertension, osteopenia, hypothyroidism, hypercholesterolemia, and anxiety. Dr. Drake is her psychiatrist and manages her medications.\par \par She sees a cardiologist (Dr. Olivares) and does not need an EKG today.

## 2022-08-13 LAB
ALBUMIN SERPL ELPH-MCNC: 4.4 G/DL
ALP BLD-CCNC: 84 U/L
ALT SERPL-CCNC: 41 U/L
ANION GAP SERPL CALC-SCNC: 12 MMOL/L
AST SERPL-CCNC: 30 U/L
BILIRUB SERPL-MCNC: 0.4 MG/DL
BUN SERPL-MCNC: 29 MG/DL
CALCIUM SERPL-MCNC: 9.5 MG/DL
CHLORIDE SERPL-SCNC: 101 MMOL/L
CHOLEST SERPL-MCNC: 204 MG/DL
CO2 SERPL-SCNC: 25 MMOL/L
CREAT SERPL-MCNC: 1.07 MG/DL
EGFR: 54 ML/MIN/1.73M2
ESTIMATED AVERAGE GLUCOSE: 120 MG/DL
GLUCOSE SERPL-MCNC: 96 MG/DL
HBA1C MFR BLD HPLC: 5.8 %
HDLC SERPL-MCNC: 46 MG/DL
LDLC SERPL CALC-MCNC: 141 MG/DL
NONHDLC SERPL-MCNC: 158 MG/DL
POTASSIUM SERPL-SCNC: 4.4 MMOL/L
PROT SERPL-MCNC: 7.1 G/DL
SODIUM SERPL-SCNC: 138 MMOL/L
T3FREE SERPL-MCNC: 1.49 PG/ML
T4 FREE SERPL-MCNC: 0.7 NG/DL
TRIGL SERPL-MCNC: 87 MG/DL
TSH SERPL-ACNC: 5.97 UIU/ML

## 2022-08-16 ENCOUNTER — NON-APPOINTMENT (OUTPATIENT)
Age: 77
End: 2022-08-16

## 2022-10-14 ENCOUNTER — NON-APPOINTMENT (OUTPATIENT)
Age: 77
End: 2022-10-14

## 2022-10-17 ENCOUNTER — FORM ENCOUNTER (OUTPATIENT)
Age: 77
End: 2022-10-17

## 2022-11-05 ENCOUNTER — EMERGENCY (EMERGENCY)
Facility: HOSPITAL | Age: 77
LOS: 0 days | Discharge: HOME | End: 2022-11-05
Attending: EMERGENCY MEDICINE
Payer: MEDICARE

## 2022-11-05 VITALS
RESPIRATION RATE: 18 BRPM | DIASTOLIC BLOOD PRESSURE: 87 MMHG | SYSTOLIC BLOOD PRESSURE: 138 MMHG | TEMPERATURE: 98 F | HEART RATE: 70 BPM | OXYGEN SATURATION: 94 %

## 2022-11-05 VITALS — HEIGHT: 60 IN | WEIGHT: 175.05 LBS

## 2022-11-05 DIAGNOSIS — Z88.5 ALLERGY STATUS TO NARCOTIC AGENT: ICD-10-CM

## 2022-11-05 DIAGNOSIS — E78.5 HYPERLIPIDEMIA, UNSPECIFIED: ICD-10-CM

## 2022-11-05 DIAGNOSIS — S00.83XA CONTUSION OF OTHER PART OF HEAD, INITIAL ENCOUNTER: ICD-10-CM

## 2022-11-05 DIAGNOSIS — F32.A DEPRESSION, UNSPECIFIED: ICD-10-CM

## 2022-11-05 DIAGNOSIS — S00.01XA ABRASION OF SCALP, INITIAL ENCOUNTER: ICD-10-CM
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
## 2022-11-05 DIAGNOSIS — Z88.8 ALLERGY STATUS TO OTHER DRUGS, MEDICAMENTS AND BIOLOGICAL SUBSTANCES: ICD-10-CM
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
## 2022-11-05 DIAGNOSIS — I10 ESSENTIAL (PRIMARY) HYPERTENSION: ICD-10-CM

## 2022-11-05 DIAGNOSIS — Z79.82 LONG TERM (CURRENT) USE OF ASPIRIN: ICD-10-CM

## 2022-11-05 DIAGNOSIS — Z88.0 ALLERGY STATUS TO PENICILLIN: ICD-10-CM

## 2022-11-05 PROCEDURE — 99282 EMERGENCY DEPT VISIT SF MDM: CPT

## 2022-11-05 PROCEDURE — 99282 EMERGENCY DEPT VISIT SF MDM: CPT | Mod: FS

## 2022-11-05 NOTE — ED STATDOCS - PROGRESS NOTE DETAILS
pt aware to fu with pmd and return to ED for any worsening of symptoms. pt well appearing on dc and agrees with plan. -Susan Montes PA-C

## 2022-11-05 NOTE — ED STATDOCS - PATIENT PORTAL LINK FT
You can access the FollowMyHealth Patient Portal offered by [facility] by registering at the following website: [email or web address]. By joining [facility]’s FollowMyHealth portal, you will also be able to view your health information using other applications (apps) compatible with our system.

## 2022-11-05 NOTE — ED ADULT TRIAGE NOTE - CHIEF COMPLAINT QUOTE
Pt sent to the ED from urgent care for slight ecchymosis to right side of forehead. Pt denies any fall. Denies head trauma. Pt states that she take a baby aspirin daily.

## 2022-11-05 NOTE — ED STATDOCS - OBJECTIVE STATEMENT
76 yo F with PMHx of HLD, HTN, depression presents to ED from  c/o ecchymosis to the R forehead.  Pt reports waking up with the hematoma this morning and took baby ASA for it. Denies fall or head trauma. Pt usually takes baby ASA, 1 unit.

## 2022-11-05 NOTE — ED STATDOCS - ATTENDING APP SHARED VISIT CONTRIBUTION OF CARE
I, Delfina Tomas MD,  performed the initial face to face bedside interview with this patient regarding history of present illness, review of symptoms and relevant past medical, social and family history.  I completed an independent physical examination.  I was the initial provider who evaluated this patient.   I personally saw the patient and performed a substantive portion of the visit including all aspects of the medical decision making.  I have signed out the follow up of any pending tests (i.e. labs, radiological studies) to the CAR[facility].  I have communicated the patient’s plan of care and disposition with the CARMEN.  The history, relevant review of systems, past medical and surgical history, medical decision making, and physical examination was documented by the scribe in my presence and I attest to the accuracy of the documentation.

## 2022-11-13 ENCOUNTER — FORM ENCOUNTER (OUTPATIENT)
Age: 77
End: 2022-11-13

## 2022-11-16 ENCOUNTER — APPOINTMENT (OUTPATIENT)
Dept: FAMILY MEDICINE | Facility: CLINIC | Age: 77
End: 2022-11-16

## 2022-11-16 VITALS
WEIGHT: 175 LBS | BODY MASS INDEX: 34.18 KG/M2 | SYSTOLIC BLOOD PRESSURE: 126 MMHG | HEART RATE: 75 BPM | DIASTOLIC BLOOD PRESSURE: 78 MMHG | OXYGEN SATURATION: 97 % | TEMPERATURE: 97 F

## 2022-11-16 DIAGNOSIS — K57.90 DIVERTICULOSIS OF INTESTINE, PART UNSPECIFIED, W/OUT PERFORATION OR ABSCESS W/OUT BLEEDING: ICD-10-CM

## 2022-11-16 DIAGNOSIS — E78.00 PURE HYPERCHOLESTEROLEMIA, UNSPECIFIED: ICD-10-CM

## 2022-11-16 DIAGNOSIS — M85.80 OTHER SPECIFIED DISORDERS OF BONE DENSITY AND STRUCTURE, UNSPECIFIED SITE: ICD-10-CM

## 2022-11-16 PROCEDURE — 36415 COLL VENOUS BLD VENIPUNCTURE: CPT

## 2022-11-16 PROCEDURE — 99214 OFFICE O/P EST MOD 30 MIN: CPT | Mod: 25

## 2022-11-16 NOTE — PHYSICAL EXAM
[No Acute Distress] : no acute distress [Well Nourished] : well nourished [Well Developed] : well developed [Well-Appearing] : well-appearing [No JVD] : no jugular venous distention [No Lymphadenopathy] : no lymphadenopathy [Supple] : supple [Thyroid Normal, No Nodules] : the thyroid was normal and there were no nodules present [No Respiratory Distress] : no respiratory distress  [No Accessory Muscle Use] : no accessory muscle use [Normal Rate] : normal rate  [Clear to Auscultation] : lungs were clear to auscultation bilaterally [Regular Rhythm] : with a regular rhythm [Normal S1, S2] : normal S1 and S2 [No Murmur] : no murmur heard [No Carotid Bruits] : no carotid bruits [No Varicosities] : no varicosities [Pedal Pulses Present] : the pedal pulses are present [No Edema] : there was no peripheral edema [No Extremity Clubbing/Cyanosis] : no extremity clubbing/cyanosis [Soft] : abdomen soft [Non Tender] : non-tender [Non-distended] : non-distended [Normal Bowel Sounds] : normal bowel sounds [Normal Posterior Cervical Nodes] : no posterior cervical lymphadenopathy [Normal Anterior Cervical Nodes] : no anterior cervical lymphadenopathy [No CVA Tenderness] : no CVA  tenderness [No Spinal Tenderness] : no spinal tenderness [No Joint Swelling] : no joint swelling [Grossly Normal Strength/Tone] : grossly normal strength/tone [No Rash] : no rash [Coordination Grossly Intact] : coordination grossly intact [No Focal Deficits] : no focal deficits [Normal Gait] : normal gait [Normal Affect] : the affect was normal [Normal Insight/Judgement] : insight and judgment were intact

## 2022-11-16 NOTE — ASSESSMENT
[FreeTextEntry1] : He is here to follow up on diverticulosis, hypertension, hypothyroidism, hypercholesterolemia , impaired glucose tolerance, and osteopenia. \par \par She saw her cardiologist who recommended red yeast rice for her cholesterol. She would like her labs today sent to Dr. Olivares. If her cholesterol is not improved he recommended a PSCK9 inhibitor.\par \par It sounds like she is taking only 135 mg of [place] thyroid so if her TSH is still high, will send a new Rx for 30 mg of [place] thyroid since she was confused about taking two 15 mg tablets in addition to the 120 mg tablet.

## 2022-11-16 NOTE — PLAN
[FreeTextEntry1] : Continue all medications as prescribed. Check labs as above. Will adjust any medications based upon lab results.\par \par Reviewed age-appropriate preventive screening tests with patient. She had a flu shot and COVID booster this Fall.\par \par Discussed clean eating (e.g. Mediterranean style diet) and recommendations for regular exercise/staying as physically active as possible.\par \par Reviewed importance of good self care (e.g. meditation, yoga, adequate rest, regular exercise, magnesium, clean eating, etc.).\par \par Follow up based upon lab results.\par

## 2022-11-16 NOTE — HISTORY OF PRESENT ILLNESS
[FreeTextEntry1] : KITTY SHELBY is a 77 year old female here for a follow up visit.  [de-identified] : He has a history of diverticulosis, hypertension, hypothyroidism, osteopenia, and hypercholesterolemia. Her last visit was 8/9/22 for her annual physical. At that time her cholesterol and HgbA1c were both elevated and she was hypothyroid (elevated TSH, low Free T4 and Free T3). She was advised to increase her [place] Thyroid from 135 to 150 mg daily and to repeat her labs in 6-8 weeks. \par \par She was also going to discuss her elevated cholesterol with her cardiologist. She is only taking Zetia for her cholesterol because she reports side effects from 4 separate statins.

## 2022-11-17 LAB
ALBUMIN SERPL ELPH-MCNC: 4.3 G/DL
ALP BLD-CCNC: 75 U/L
ALT SERPL-CCNC: 36 U/L
ANION GAP SERPL CALC-SCNC: 13 MMOL/L
AST SERPL-CCNC: 29 U/L
BILIRUB SERPL-MCNC: 0.3 MG/DL
BUN SERPL-MCNC: 29 MG/DL
CALCIUM SERPL-MCNC: 9.3 MG/DL
CHLORIDE SERPL-SCNC: 101 MMOL/L
CHOLEST SERPL-MCNC: 166 MG/DL
CO2 SERPL-SCNC: 24 MMOL/L
CREAT SERPL-MCNC: 0.98 MG/DL
EGFR: 60 ML/MIN/1.73M2
ESTIMATED AVERAGE GLUCOSE: 117 MG/DL
GLUCOSE SERPL-MCNC: 98 MG/DL
HBA1C MFR BLD HPLC: 5.7 %
HDLC SERPL-MCNC: 38 MG/DL
LDLC SERPL CALC-MCNC: 107 MG/DL
NONHDLC SERPL-MCNC: 128 MG/DL
POTASSIUM SERPL-SCNC: 4.3 MMOL/L
PROT SERPL-MCNC: 7.2 G/DL
SODIUM SERPL-SCNC: 137 MMOL/L
T3FREE SERPL-MCNC: 1.62 PG/ML
T4 FREE SERPL-MCNC: 0.8 NG/DL
TRIGL SERPL-MCNC: 104 MG/DL
TSH SERPL-ACNC: 3.84 UIU/ML

## 2022-11-18 ENCOUNTER — NON-APPOINTMENT (OUTPATIENT)
Age: 77
End: 2022-11-18

## 2023-01-19 ENCOUNTER — APPOINTMENT (OUTPATIENT)
Dept: ORTHOPEDIC SURGERY | Facility: CLINIC | Age: 78
End: 2023-01-19
Payer: MEDICARE

## 2023-01-19 VITALS
SYSTOLIC BLOOD PRESSURE: 140 MMHG | DIASTOLIC BLOOD PRESSURE: 86 MMHG | HEIGHT: 60 IN | WEIGHT: 175 LBS | HEART RATE: 68 BPM | BODY MASS INDEX: 34.36 KG/M2

## 2023-01-19 DIAGNOSIS — Z60.2 PROBLEMS RELATED TO LIVING ALONE: ICD-10-CM

## 2023-01-19 DIAGNOSIS — M17.12 UNILATERAL PRIMARY OSTEOARTHRITIS, LEFT KNEE: ICD-10-CM

## 2023-01-19 PROCEDURE — 20610 DRAIN/INJ JOINT/BURSA W/O US: CPT | Mod: LT

## 2023-01-19 PROCEDURE — 73560 X-RAY EXAM OF KNEE 1 OR 2: CPT | Mod: LT

## 2023-01-19 SDOH — SOCIAL STABILITY - SOCIAL INSECURITY: PROBLEMS RELATED TO LIVING ALONE: Z60.2

## 2023-01-20 PROBLEM — Z60.2 LIVES ALONE: Status: ACTIVE | Noted: 2023-01-20

## 2023-01-23 VITALS
DIASTOLIC BLOOD PRESSURE: 86 MMHG | WEIGHT: 175 LBS | BODY MASS INDEX: 34.36 KG/M2 | SYSTOLIC BLOOD PRESSURE: 140 MMHG | HEIGHT: 60 IN

## 2023-01-23 NOTE — HISTORY OF PRESENT ILLNESS
[5] : the relief from treatment is 5/10 [4] : the relief from medicine is 4/10 [8] : the ailment interference is 8/10 [(Does not interfere) 0] : the ailment interference is 0/10 (does not interfere) [7] : the ailment interference is 7/10 [9] : the ailment interference is 9/10 [] : Yes [de-identified] : The patient comes in today with complaints of pain to her left knee.  She has a long history of arthritis in both knees.  She had gel injections years ago, but she has not had anything recently.  She developed some increasing complaints of pain and swelling over the last week.   The patient states the pain is intermittent.  The patient describes the pain as sharp on standing.\par  [de-identified] : Bending and standing up [de-identified] : Ice [de-identified] : Physical therapy [de-identified] : Baby aspirin [de-identified] : Fell on stairs last year, none this year.\par

## 2023-01-23 NOTE — DISCUSSION/SUMMARY
[de-identified] : At this time, I recommended ice and elevation for the left knee osteoarthritis.  The patient will be reassessed in 2-3 weeks and we will discuss the potential for further therapy versus viscosupplementation.  \par

## 2023-01-23 NOTE — PHYSICAL EXAM
[de-identified] : Right Knee: Range of Motion in Degrees	\par 	                  Claimant:	Normal:	\par Flexion Active	  135 	                135-degrees	\par Flexion Passive	  135	                135-degrees	\par Extension Active	  0-5	                0-5-degrees	\par Extension Passive	  0-5	                0-5-degrees	\par \par No weakness to flexion/extension. No evidence of instability in the AP plane or varus or valgus stress.  Negative  Lachman.  Negative pivot shift.  Negative anterior drawer test.  Negative posterior drawer test.  Negative Sarita.  Negative Apley grind.  No medial or lateral joint line tenderness.  Positive tenderness over the medial and lateral facet of the patella.  Positive patellofemoral crepitations.  No lateral tilting patella.  No patella apprehension.  Positive crepitation in the medial and lateral femoral condyle.  No proximal or distal swelling, edema or tenderness.  No gross motor or sensory deficits. Mild intra-articular swelling.  2+ DP and PT pulses. No varus or valgus malalignment.  Skin is intact.  No rashes, scars or lesions. \par \par Left Knee: Range of Motion in Degrees	\par 	                  Claimant:	Normal:	\par Flexion Active	  120	                135-degrees	\par Flexion Passive	  120	                135-degrees	\par Extension Active	  0	                0-5-degrees	\par Extension Passive	  0	                0-5-degrees	\par \par No weakness to flexion/extension. No evidence of instability in the AP plane or varus or valgus stress.  Negative  Lachman.  Negative pivot shift.  Negative anterior drawer test.  Negative posterior drawer test.  Negative Sarita.  Negative Apley grind.  No medial or lateral joint line tenderness.  Positive tenderness over the medial and lateral facet of the patella.  Positive patellofemoral crepitations.  No lateral tilting patella.  No patella apprehension.  Positive crepitation in the medial and lateral femoral condyle.  Moderate effusion.  No gross motor or sensory deficits.  2+ DP and PT pulses. No varus or valgus malalignment.  Skin is intact.  No rashes, scars or lesions. \par  [de-identified] : Gait and Station:  Ambulating with a slightly antalgic to antalgic gait.  Normal Station.  [de-identified] : Appearance:  Well developed, well-nourished female in no acute distress.\par   [de-identified] : Radiographs, two views of the left knee taken in the office today, show moderate-to-early severe degenerative changes, in particular the patellofemoral joint.\par

## 2023-01-23 NOTE — CONSULT LETTER
[Dear  ___] : Dear  [unfilled], [Consult Letter:] : I had the pleasure of evaluating your patient, [unfilled]. [Please see my note below.] : Please see my note below. [Consult Closing:] : Thank you very much for allowing me to participate in the care of this patient.  If you have any questions, please do not hesitate to contact me. [Sincerely,] : Sincerely, [FreeTextEntry3] : Damian Cabezas III, MD \par CIERRA/chaz\par

## 2023-01-23 NOTE — PROCEDURE
[de-identified] : \par Indication:\par Osteoarthritis of left knee\par \par Consent: \par At this time, I have recommended an injection to the left knee.  The risks and benefits of the procedure were discussed with the patient in detail.  Upon verbal consent of the patient, we proceeded with the injection as noted below.  \par \par Description of Procedure:  \par After a sterile prep, the patient underwent an injection of approximately 9 mL of 1% Lidocaine 10 mg/mL without epinephrine and 1 mL of Kenalog (40 mg/mL) into the left knee.  The patient tolerated the procedure well.  There were no complications. \par \par : [employer]par Drug Name: Triamcinolone Acetonide Injectable Suspension USP\par NDC#: 0143-9577-10\par Lot#:   [id number]\par Expiration Date: 01/2024\par \par \par

## 2023-02-09 ENCOUNTER — APPOINTMENT (OUTPATIENT)
Dept: ORTHOPEDIC SURGERY | Facility: CLINIC | Age: 78
End: 2023-02-09

## 2023-02-13 ENCOUNTER — NON-APPOINTMENT (OUTPATIENT)
Age: 78
End: 2023-02-13

## 2023-02-25 ENCOUNTER — EMERGENCY (EMERGENCY)
Facility: HOSPITAL | Age: 78
LOS: 0 days | Discharge: HOME | End: 2023-02-25
Attending: EMERGENCY MEDICINE
Payer: MEDICARE

## 2023-02-25 VITALS
HEART RATE: 80 BPM | TEMPERATURE: 98 F | SYSTOLIC BLOOD PRESSURE: 109 MMHG | DIASTOLIC BLOOD PRESSURE: 72 MMHG | OXYGEN SATURATION: 97 % | RESPIRATION RATE: 17 BRPM

## 2023-02-25 VITALS
SYSTOLIC BLOOD PRESSURE: 127 MMHG | OXYGEN SATURATION: 91 % | DIASTOLIC BLOOD PRESSURE: 73 MMHG | HEART RATE: 91 BPM | RESPIRATION RATE: 18 BRPM | TEMPERATURE: 98 F
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
## 2023-02-25 DIAGNOSIS — Z88.5 ALLERGY STATUS TO NARCOTIC AGENT: ICD-10-CM

## 2023-02-25 DIAGNOSIS — T43.226A UNDERDOSING OF SELECTIVE SEROTONIN REUPTAKE INHIBITORS, INITIAL ENCOUNTER: ICD-10-CM

## 2023-02-25 DIAGNOSIS — T46.6X6A UNDERDOSING OF ANTIHYPERLIPIDEMIC AND ANTIARTERIOSCLEROTIC DRUGS, INITIAL ENCOUNTER: ICD-10-CM

## 2023-02-25 DIAGNOSIS — T43.596A UNDERDOSING OF OTHER ANTIPSYCHOTICS AND NEUROLEPTICS, INITIAL ENCOUNTER: ICD-10-CM

## 2023-02-25 DIAGNOSIS — F25.9 SCHIZOAFFECTIVE DISORDER, UNSPECIFIED: ICD-10-CM

## 2023-02-25 DIAGNOSIS — R48.0 DYSLEXIA AND ALEXIA: ICD-10-CM

## 2023-02-25 DIAGNOSIS — F32.A DEPRESSION, UNSPECIFIED: ICD-10-CM

## 2023-02-25 DIAGNOSIS — T42.4X6A UNDERDOSING OF BENZODIAZEPINES, INITIAL ENCOUNTER: ICD-10-CM

## 2023-02-25 DIAGNOSIS — R10.9 UNSPECIFIED ABDOMINAL PAIN: ICD-10-CM

## 2023-02-25 DIAGNOSIS — R10.32 LEFT LOWER QUADRANT PAIN: ICD-10-CM

## 2023-02-25 DIAGNOSIS — T44.7X6A UNDERDOSING OF BETA-ADRENORECEPTOR ANTAGONISTS, INITIAL ENCOUNTER: ICD-10-CM

## 2023-02-25 DIAGNOSIS — E78.00 PURE HYPERCHOLESTEROLEMIA, UNSPECIFIED: ICD-10-CM

## 2023-02-25 DIAGNOSIS — E03.9 HYPOTHYROIDISM, UNSPECIFIED: ICD-10-CM

## 2023-02-25 DIAGNOSIS — Z20.822 CONTACT WITH AND (SUSPECTED) EXPOSURE TO COVID-19: ICD-10-CM

## 2023-02-25 DIAGNOSIS — R06.02 SHORTNESS OF BREATH: ICD-10-CM

## 2023-02-25 DIAGNOSIS — R45.1 RESTLESSNESS AND AGITATION: ICD-10-CM

## 2023-02-25 DIAGNOSIS — I10 ESSENTIAL (PRIMARY) HYPERTENSION: ICD-10-CM

## 2023-02-25 DIAGNOSIS — Z88.0 ALLERGY STATUS TO PENICILLIN: ICD-10-CM

## 2023-02-25 DIAGNOSIS — T43.026A: ICD-10-CM

## 2023-02-25 DIAGNOSIS — T43.296A UNDERDOSING OF OTHER ANTIDEPRESSANTS, INITIAL ENCOUNTER: ICD-10-CM

## 2023-02-25 DIAGNOSIS — Z91.14 PATIENT'S OTHER NONCOMPLIANCE WITH MEDICATION REGIMEN: ICD-10-CM

## 2023-02-25 DIAGNOSIS — Z88.8 ALLERGY STATUS TO OTHER DRUGS, MEDICAMENTS AND BIOLOGICAL SUBSTANCES STATUS: ICD-10-CM

## 2023-02-25 LAB
ALBUMIN SERPL ELPH-MCNC: 3.6 G/DL — SIGNIFICANT CHANGE UP (ref 3.3–5)
ALP SERPL-CCNC: 65 U/L — SIGNIFICANT CHANGE UP (ref 40–120)
ALT FLD-CCNC: 31 U/L — SIGNIFICANT CHANGE UP (ref 12–78)
AMPHET UR-MCNC: NEGATIVE — SIGNIFICANT CHANGE UP
ANION GAP SERPL CALC-SCNC: 5 MMOL/L — SIGNIFICANT CHANGE UP (ref 5–17)
APAP SERPL-MCNC: <2 UG/ML — LOW (ref 10–30)
APPEARANCE UR: CLEAR — SIGNIFICANT CHANGE UP
APTT BLD: 19 SEC — LOW (ref 27.5–35.5)
AST SERPL-CCNC: 39 U/L — HIGH (ref 15–37)
BACTERIA # UR AUTO: NEGATIVE — SIGNIFICANT CHANGE UP
BARBITURATES UR SCN-MCNC: NEGATIVE — SIGNIFICANT CHANGE UP
BASOPHILS # BLD AUTO: 0.03 K/UL — SIGNIFICANT CHANGE UP (ref 0–0.2)
BASOPHILS NFR BLD AUTO: 0.4 % — SIGNIFICANT CHANGE UP (ref 0–2)
BENZODIAZ UR-MCNC: NEGATIVE — SIGNIFICANT CHANGE UP
BILIRUB SERPL-MCNC: 0.5 MG/DL — SIGNIFICANT CHANGE UP (ref 0.2–1.2)
BILIRUB UR-MCNC: NEGATIVE — SIGNIFICANT CHANGE UP
BUN SERPL-MCNC: 26 MG/DL — HIGH (ref 7–23)
CALCIUM SERPL-MCNC: 9.4 MG/DL — SIGNIFICANT CHANGE UP (ref 8.5–10.1)
CHLORIDE SERPL-SCNC: 102 MMOL/L — SIGNIFICANT CHANGE UP (ref 96–108)
CO2 SERPL-SCNC: 25 MMOL/L — SIGNIFICANT CHANGE UP (ref 22–31)
COCAINE METAB.OTHER UR-MCNC: NEGATIVE — SIGNIFICANT CHANGE UP
COLOR SPEC: YELLOW — SIGNIFICANT CHANGE UP
CREAT SERPL-MCNC: 0.88 MG/DL — SIGNIFICANT CHANGE UP (ref 0.5–1.3)
DIFF PNL FLD: ABNORMAL
EGFR: 68 ML/MIN/1.73M2 — SIGNIFICANT CHANGE UP
EOSINOPHIL # BLD AUTO: 0.1 K/UL — SIGNIFICANT CHANGE UP (ref 0–0.5)
EOSINOPHIL NFR BLD AUTO: 1.4 % — SIGNIFICANT CHANGE UP (ref 0–6)
EPI CELLS # UR: NEGATIVE — SIGNIFICANT CHANGE UP
ETHANOL SERPL-MCNC: <10 MG/DL — SIGNIFICANT CHANGE UP (ref 0–10)
FLUAV AG NPH QL: SIGNIFICANT CHANGE UP
FLUBV AG NPH QL: SIGNIFICANT CHANGE UP
GLUCOSE SERPL-MCNC: 120 MG/DL — HIGH (ref 70–99)
GLUCOSE UR QL: NEGATIVE — SIGNIFICANT CHANGE UP
HCT VFR BLD CALC: 41.2 % — SIGNIFICANT CHANGE UP (ref 34.5–45)
HGB BLD-MCNC: 14 G/DL — SIGNIFICANT CHANGE UP (ref 11.5–15.5)
IMM GRANULOCYTES NFR BLD AUTO: 0.4 % — SIGNIFICANT CHANGE UP (ref 0–0.9)
INR BLD: 1.04 RATIO — SIGNIFICANT CHANGE UP (ref 0.88–1.16)
KETONES UR-MCNC: NEGATIVE — SIGNIFICANT CHANGE UP
LEUKOCYTE ESTERASE UR-ACNC: NEGATIVE — SIGNIFICANT CHANGE UP
LIDOCAIN IGE QN: 96 U/L — SIGNIFICANT CHANGE UP (ref 73–393)
LYMPHOCYTES # BLD AUTO: 1.26 K/UL — SIGNIFICANT CHANGE UP (ref 1–3.3)
LYMPHOCYTES # BLD AUTO: 17.2 % — SIGNIFICANT CHANGE UP (ref 13–44)
MAGNESIUM SERPL-MCNC: 1.9 MG/DL — SIGNIFICANT CHANGE UP (ref 1.6–2.6)
MANUAL SMEAR VERIFICATION: SIGNIFICANT CHANGE UP
MCHC RBC-ENTMCNC: 31.9 PG — SIGNIFICANT CHANGE UP (ref 27–34)
MCHC RBC-ENTMCNC: 34 GM/DL — SIGNIFICANT CHANGE UP (ref 32–36)
MCV RBC AUTO: 93.8 FL — SIGNIFICANT CHANGE UP (ref 80–100)
METHADONE UR-MCNC: NEGATIVE — SIGNIFICANT CHANGE UP
MONOCYTES # BLD AUTO: 0.74 K/UL — SIGNIFICANT CHANGE UP (ref 0–0.9)
MONOCYTES NFR BLD AUTO: 10.1 % — SIGNIFICANT CHANGE UP (ref 2–14)
NEUTROPHILS # BLD AUTO: 5.15 K/UL — SIGNIFICANT CHANGE UP (ref 1.8–7.4)
NEUTROPHILS NFR BLD AUTO: 70.5 % — SIGNIFICANT CHANGE UP (ref 43–77)
NITRITE UR-MCNC: NEGATIVE — SIGNIFICANT CHANGE UP
NT-PROBNP SERPL-SCNC: 202 PG/ML — SIGNIFICANT CHANGE UP (ref 0–450)
OPIATES UR-MCNC: NEGATIVE — SIGNIFICANT CHANGE UP
PCP SPEC-MCNC: SIGNIFICANT CHANGE UP
PCP UR-MCNC: NEGATIVE — SIGNIFICANT CHANGE UP
PH UR: 5 — SIGNIFICANT CHANGE UP (ref 5–8)
PLAT MORPH BLD: NORMAL — SIGNIFICANT CHANGE UP
PLATELET # BLD AUTO: 186 K/UL — SIGNIFICANT CHANGE UP (ref 150–400)
POTASSIUM SERPL-MCNC: 4.2 MMOL/L — SIGNIFICANT CHANGE UP (ref 3.5–5.3)
POTASSIUM SERPL-SCNC: 4.2 MMOL/L — SIGNIFICANT CHANGE UP (ref 3.5–5.3)
PROT SERPL-MCNC: 7.2 GM/DL — SIGNIFICANT CHANGE UP (ref 6–8.3)
PROT UR-MCNC: NEGATIVE — SIGNIFICANT CHANGE UP
PROTHROM AB SERPL-ACNC: 12.1 SEC — SIGNIFICANT CHANGE UP (ref 10.5–13.4)
RBC # BLD: 4.39 M/UL — SIGNIFICANT CHANGE UP (ref 3.8–5.2)
RBC # FLD: 12.7 % — SIGNIFICANT CHANGE UP (ref 10.3–14.5)
RBC BLD AUTO: NORMAL — SIGNIFICANT CHANGE UP
RBC CASTS # UR COMP ASSIST: NEGATIVE /HPF — SIGNIFICANT CHANGE UP (ref 0–4)
RSV RNA NPH QL NAA+NON-PROBE: SIGNIFICANT CHANGE UP
SALICYLATES SERPL-MCNC: 4 MG/DL — SIGNIFICANT CHANGE UP (ref 2.8–20)
SARS-COV-2 RNA SPEC QL NAA+PROBE: SIGNIFICANT CHANGE UP
SODIUM SERPL-SCNC: 132 MMOL/L — LOW (ref 135–145)
SP GR SPEC: 1 — LOW (ref 1.01–1.02)
THC UR QL: NEGATIVE — SIGNIFICANT CHANGE UP
TROPONIN I, HIGH SENSITIVITY RESULT: 6.38 NG/L — SIGNIFICANT CHANGE UP
UROBILINOGEN FLD QL: NEGATIVE — SIGNIFICANT CHANGE UP
WBC # BLD: 7.31 K/UL — SIGNIFICANT CHANGE UP (ref 3.8–10.5)
WBC # FLD AUTO: 7.31 K/UL — SIGNIFICANT CHANGE UP (ref 3.8–10.5)
WBC UR QL: NEGATIVE /HPF — SIGNIFICANT CHANGE UP (ref 0–5)

## 2023-02-25 PROCEDURE — 83880 ASSAY OF NATRIURETIC PEPTIDE: CPT

## 2023-02-25 PROCEDURE — 80307 DRUG TEST PRSMV CHEM ANLYZR: CPT

## 2023-02-25 PROCEDURE — 71045 X-RAY EXAM CHEST 1 VIEW: CPT

## 2023-02-25 PROCEDURE — 80053 COMPREHEN METABOLIC PANEL: CPT

## 2023-02-25 PROCEDURE — 96372 THER/PROPH/DIAG INJ SC/IM: CPT

## 2023-02-25 PROCEDURE — 85610 PROTHROMBIN TIME: CPT

## 2023-02-25 PROCEDURE — 0241U: CPT

## 2023-02-25 PROCEDURE — 99285 EMERGENCY DEPT VISIT HI MDM: CPT | Mod: 25

## 2023-02-25 PROCEDURE — 85025 COMPLETE CBC W/AUTO DIFF WBC: CPT

## 2023-02-25 PROCEDURE — 74177 CT ABD & PELVIS W/CONTRAST: CPT | Mod: MA

## 2023-02-25 PROCEDURE — 93005 ELECTROCARDIOGRAM TRACING: CPT

## 2023-02-25 PROCEDURE — 36415 COLL VENOUS BLD VENIPUNCTURE: CPT

## 2023-02-25 PROCEDURE — 84484 ASSAY OF TROPONIN QUANT: CPT

## 2023-02-25 PROCEDURE — 83735 ASSAY OF MAGNESIUM: CPT

## 2023-02-25 PROCEDURE — 71045 X-RAY EXAM CHEST 1 VIEW: CPT | Mod: 26

## 2023-02-25 PROCEDURE — 81001 URINALYSIS AUTO W/SCOPE: CPT

## 2023-02-25 PROCEDURE — 85730 THROMBOPLASTIN TIME PARTIAL: CPT

## 2023-02-25 PROCEDURE — 93010 ELECTROCARDIOGRAM REPORT: CPT

## 2023-02-25 PROCEDURE — 99282 EMERGENCY DEPT VISIT SF MDM: CPT

## 2023-02-25 PROCEDURE — 99285 EMERGENCY DEPT VISIT HI MDM: CPT

## 2023-02-25 PROCEDURE — 83690 ASSAY OF LIPASE: CPT

## 2023-02-25 PROCEDURE — 74177 CT ABD & PELVIS W/CONTRAST: CPT | Mod: 26,MA

## 2023-02-25 RX ORDER — SODIUM CHLORIDE 9 MG/ML
1000 INJECTION INTRAMUSCULAR; INTRAVENOUS; SUBCUTANEOUS ONCE
Refills: 0 | Status: COMPLETED | OUTPATIENT
Start: 2023-02-25 | End: 2023-02-25

## 2023-02-25 RX ORDER — METOPROLOL TARTRATE 50 MG
25 TABLET ORAL ONCE
Refills: 0 | Status: COMPLETED | OUTPATIENT
Start: 2023-02-25 | End: 2023-02-25

## 2023-02-25 RX ORDER — BUPROPION HYDROCHLORIDE 150 MG/1
300 TABLET, EXTENDED RELEASE ORAL ONCE
Refills: 0 | Status: COMPLETED | OUTPATIENT
Start: 2023-02-25 | End: 2023-02-25

## 2023-02-25 RX ORDER — THYROID 120 MG
150 TABLET ORAL ONCE
Refills: 0 | Status: COMPLETED | OUTPATIENT
Start: 2023-02-25 | End: 2023-02-25

## 2023-02-25 RX ORDER — ARIPIPRAZOLE 15 MG/1
5 TABLET ORAL ONCE
Refills: 0 | Status: COMPLETED | OUTPATIENT
Start: 2023-02-25 | End: 2023-02-25

## 2023-02-25 RX ORDER — HALOPERIDOL DECANOATE 100 MG/ML
5 INJECTION INTRAMUSCULAR ONCE
Refills: 0 | Status: COMPLETED | OUTPATIENT
Start: 2023-02-25 | End: 2023-02-25

## 2023-02-25 RX ORDER — ACETAMINOPHEN 500 MG
650 TABLET ORAL ONCE
Refills: 0 | Status: COMPLETED | OUTPATIENT
Start: 2023-02-25 | End: 2023-02-25

## 2023-02-25 RX ORDER — CLONAZEPAM 1 MG
0.5 TABLET ORAL ONCE
Refills: 0 | Status: DISCONTINUED | OUTPATIENT
Start: 2023-02-25 | End: 2023-02-25

## 2023-02-25 RX ORDER — CITALOPRAM 10 MG/1
40 TABLET, FILM COATED ORAL ONCE
Refills: 0 | Status: COMPLETED | OUTPATIENT
Start: 2023-02-25 | End: 2023-02-25

## 2023-02-25 RX ADMIN — Medication 0.5 MILLIGRAM(S): at 06:07

## 2023-02-25 RX ADMIN — CITALOPRAM 40 MILLIGRAM(S): 10 TABLET, FILM COATED ORAL at 13:13

## 2023-02-25 RX ADMIN — Medication 25 MILLIGRAM(S): at 12:59

## 2023-02-25 RX ADMIN — Medication 150 MILLIGRAM(S): at 13:36

## 2023-02-25 RX ADMIN — SODIUM CHLORIDE 1000 MILLILITER(S): 9 INJECTION INTRAMUSCULAR; INTRAVENOUS; SUBCUTANEOUS at 06:26

## 2023-02-25 RX ADMIN — BUPROPION HYDROCHLORIDE 300 MILLIGRAM(S): 150 TABLET, EXTENDED RELEASE ORAL at 13:13

## 2023-02-25 RX ADMIN — HALOPERIDOL DECANOATE 5 MILLIGRAM(S): 100 INJECTION INTRAMUSCULAR at 06:07

## 2023-02-25 RX ADMIN — ARIPIPRAZOLE 5 MILLIGRAM(S): 15 TABLET ORAL at 12:59

## 2023-02-25 NOTE — ED PROVIDER NOTE - NSFOLLOWUPINSTRUCTIONS_ED_ALL_ED_FT
[facility]                                                                                                                            Managing Schizophrenia      If you have been diagnosed with schizophrenia, you may be relieved to know why you have felt or behaved a certain way. Still, you may have questions about the treatment ahead, how to get the support you need, and how to deal with the condition day-to-day. With care and support, you can learn to manage your symptoms and live with schizophrenia.      How to manage lifestyle changes      Managing stress   A person writing in a diary.    Stress is your body's reaction to life changes and events, both good and bad. For people with schizophrenia, stress can be a trigger. This means that stress can cause more severe symptoms to start. Therefore, it is important to learn ways to deal with stress. Your health care provider may suggest techniques, such as:  •Meditation, muscle relaxation, and breathing exercises.      •Music therapy. This can include creating music or listening to music.      •Life skills training. This training is focused on work, self-care, money, house management, and social skills.      Other things you can do to manage stress include:  •Keeping a stress diary. This can help you learn about your triggers, and how you can control your response to those triggers.      •Exercising. Even a short daily walk can help.      •Getting enough sleep.      •Making a schedule to manage your time. Knowing what you will do from day to day helps you avoid feeling overwhelmed by tasks and deadlines.      •Spending time on things that help you relax, such as your favorite hobbies.      Medicines     Antipsychotic medicines are usually prescribed to help manage this condition. Make sure you:  •Talk with your pharmacist or health care provider about all medicines that you take, the possible side effects, and which medicines are safe to take together.      •Make it your goal to take part in all treatment decisions (shared decision-making). Ask about possible side effects of medicines that your health care provider recommends, and tell him or her how you feel about having those side effects. It is best if shared decision-making with your health care provider is part of your treatment plan.      Relationships    Having the support of your family and friends can play a major role in the success of your treatment. The following steps can help you maintain healthy relationships:  •Think about going to couples therapy, family therapy, or family education classes.      •Create a written plan for your treatment, and include close family members and friends in the process.      •Consider bringing your partner or another family member or friend to the appointments you have with your health care provider.        How to recognize changes in your condition    If you find that your condition is getting worse, talk to your health care provider right away. Watch for these signs:  •You hear, see, and taste things that others do not (hallucinate).      •You cannot set aside persistent, unusual thoughts or beliefs, no matter what others may believe.      •Your speech becomes unclear.      •You withdraw from friends and family members.       •You have racing thoughts and have trouble thinking clearly or staying focused.      •You have poor personal hygiene, weight gain or weight loss, or changes in how you are sleeping or eating.        Follow these instructions at home:    •Take over-the-counter and prescription medicines only as told by your health care provider. Do not start new medicines or stop taking medicines before you ask your health care provider if it is safe to make those changes.      •Avoid caffeine, alcohol, and drugs. They can affect how your medicines work and can make your symptoms worse.      •Eat a healthy diet.      •Look for support groups in your area so you can meet other people with your condition. You will learn different methods that others have used to manage schizophrenia.      •Keep all follow-up visits. This is important. Follow-up visits include visits to therapists and counselors.        Where to find support    Talking to others     •Reach out to trusted friends or family members, explain your condition, and let them know that you are working with a health care team.      •Consider giving educational materials to friends and family.      •If you are having trouble telling your friends and family about your condition, keep in mind that honest and open communication can make these conversations easier.      Finances     Check with your insurance carrier to find out what treatment options are covered by your plan. You may also be able to find financial assistance through not-for-profit organizations or local government resources.    You may be able to get the generic form of medicines that you take. Generic medicines are less expensive than brand-name medicines. Some makers of prescription medicines also offer help to patients who cannot afford the medicines that they need.    Therapy and support groups     •Find a counselor or therapist who is familiar with schizophrenia. Meet with your counselor or therapist once a week or more often if needed.      •Find support programs for people with schizophrenia.        Where to find more information    •[place] on Mental Illness: hong.org        Contact a health care provider if:    •You are not able to take your medicines as prescribed.      •Your symptoms get worse.        Get help right away if:    •You have thoughts about hurting yourself or others.      Get help right away if you feel like you may hurt yourself or others, or have thoughts about taking your own life. Go to your nearest emergency room or:   • Call 911.       • Call the National Suicide Prevention Lifeline at 1-590.338.4785 or 165. This is open 24 hours a day.       • Text the Crisis Text Line at 822071.         Summary    •With care and support, you can learn to manage your symptoms and live with schizophrenia.      •Learning ways to deal with stress may help your treatment work better.      •Having the support of your family and friends can help to make your treatment a success.      •If you find that your condition is getting worse, talk to your health care provider right away.      This information is not intended to replace advice given to you by your health care provider. Make sure you discuss any questions you have with your health care provider.      Document Revised: 11/13/2022 Document Reviewed: 11/13/2022    Elsevier Patient Education © 2023 Elsevier Inc.

## 2023-02-25 NOTE — ED PROVIDER NOTE - PHYSICAL EXAMINATION
Constitutional: NAD AAOx3  Eyes: PERRLA EOMI  Head: Normocephalic atraumatic  Mouth: MMM  Cardiac: regular rate   Resp: Lungs CTAB  GI: Abd s/nd + mild ttp llq no rebound or guarding no cvat  Neuro: CN2-12 intact  Skin: No visible rashes

## 2023-02-25 NOTE — ED PROVIDER NOTE - OBJECTIVE STATEMENT
77-year-old female history of hypertension high cholesterol depression dyslexia diverticulitis presents to the emergency department for abdominal pain.  Patient states for the past couple days she has been having abdominal discomfort.  States she has been eating a lot of seeds and thinks this may be aggravating diverticulitis.  No nausea vomiting or fevers no constipation or diarrhea no chest pain patient states she has mild shortness of breath no cough.

## 2023-02-25 NOTE — ED ADULT NURSE REASSESSMENT NOTE - NS ED NURSE REASSESS COMMENT FT1
pt able to ambulate independently
Pt awaiting psych consult/SW; agitated, acting irrational, screaming at staff. Pt yelled at multiple RNs to wear sterile gloves or she will call infection control. Pt has unsteady gait stating she is going to "walk home AMA" Pt states she will not get back into the bed unless "a black person gives her all clean sheets." Verbal deescalation attempts made, unsuccessful. pt medicated as per eMAR,  placed on 1:1 for safety. Pt not endorsing SI/HI.

## 2023-02-25 NOTE — ED PROVIDER NOTE - NS ED ROS FT
I have reviewed and confirmed nurses' notes...
Constitutional: No fever or chills  Eyes: No visual changes  HEENT: No throat pain  CV: No chest pain  Resp: + SOB no cough  GI: + abd pain, no nausea or vomiting  : No dysuria  MSK: No musculoskeletal pain  Skin: No rash  Neuro: No headache

## 2023-02-25 NOTE — ED PROVIDER NOTE - PATIENT PORTAL LINK FT
You can access the FollowMyHealth Patient Portal offered by [facility] by registering at the following website: [email or web address]. By joining [employer]’s FollowMyHealth portal, you will also be able to view your health information using other applications (apps) compatible with our system.

## 2023-02-25 NOTE — ED PROVIDER NOTE - PROGRESS NOTE DETAILS
Luis Eduardo MOORE: I received s/o from Dr. Spangler pending psych consult; S/o to Dr. Romo pending repeat evaluation by psychiatry; per Dr. Zepeda- can be re-evaluated at 3pm and if no acute psychiatric issue- can be discharged. Patient was administered her home meds and is being observed at this time. Pt signed out by Dr. Hoff as pending psych reassess. Dr. Zepeda re-evaluated patient and is doing much better, pt is cleared for d/c, and will f/u w her psychiatrist Dr. Drake.

## 2023-02-25 NOTE — ED ADULT NURSE NOTE - OBJECTIVE STATEMENT
Pt w hx of diverticulitis presents to ED c/o abdominal pain. Pt states she has been having abd pain for the last couple of days. Pt states she "ate a lot of flax seeds" and states she thinks "one is stuck in the lining of her stomach". No n/v/d, fevers, CP. Pt denies SOB/cough to this RN.

## 2023-02-25 NOTE — ED BEHAVIORAL HEALTH ASSESSMENT NOTE - CURRENT MEDICATION
Wellbutrin  mg; Celexa 40 mg daily; Abilify 5 mg daily; Klonopin 0.5 mg Toprol XL 25 mg BID. [place] Thyroid 150 mg daily

## 2023-02-25 NOTE — ED BEHAVIORAL HEALTH ASSESSMENT NOTE - HPI (INCLUDE ILLNESS QUALITY, SEVERITY, DURATION, TIMING, CONTEXT, MODIFYING FACTORS, ASSOCIATED SIGNS AND SYMPTOMS)
Patient a 76 y/o single female, domiciled alone, hx of Schizophrenia/SAD, under care of Dr. Mazin Drake her Psychiatrist for many years, no prior SI/SA, no drug abuse hx, no legal issues, medically has HTN; Hypo-thyroid, was BIB/ENS due to above complaints.    Patient in bed was agitated and was medicated with Haldol 5 mg IM and also received Klonopin 0.5 mg x 1 dose. She was sedated when writer tried to see her and was able to wake her up later on. She is SAAOX3, endorses that she stopped her meds for 2 days for reasons unclear, denied being depressed, not S/h and never tried to commit suicide in the past. She denied A/V/H, was focused on her abdominal pain, and was informed that her CT scan was normal and there are no abnormalities which she accepted. Writer spoke with her Psychiatrist Dr. Drake at [phone] and informed of her stay in ED. He endorses that she looked off and informed of her meds and agreed to give her 1 dose of her regular meds and to observe in ED and if she does well to discharge her home and with f/u with Dr. Drake ASAP. In ED she received a dose of 1000 ml Fluid of Nacl.    Writer also spoke with her uncle with whom she shares a trust find @ [phone]. As per her uncle no prior SI/SA, takes her meds as ordered and is under care of Dr. Drake for 30 + years and last hospitalization was more than 5 years back. She called EMS through [facility] and she stopped her Klonopin for reasons unknown, which patient later told that she felt drowsy so she stopped her Klonopin. She was advised to take Klonopin only at night. 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 Mazin Drake her Psychiatrist for many years, no prior SI/SA, no drug abuse hx, no legal issues, medically has HTN; Hypo-thyroid, was BIB/ENS due to above complaints.    Patient in bed was agitated and was medicated with Haldol 5 mg IM and also received Klonopin 0.5 mg x 1 dose. She was sedated when writer tried to see her and was able to wake her up later on. She is SAAOX3, endorses that she stopped her meds for 2 days for reasons unclear, denied being depressed, not S/h and never tried to commit suicide in the past. She denied A/V/H, was focused on her abdominal pain, and was informed that her CT scan was normal and there are no abnormalities which she accepted. Writer spoke with her Psychiatrist Dr. Drake at [phone] and informed of her stay in ED. He endorses that she looked off and informed of her meds and agreed to give her 1 dose of her regular meds and to observe in ED and if she does well to discharge her home and with f/u with Dr. Drake ASA. In ED she received a dose of 1000 ml Fluid of Nacl.    Writer also spoke with her uncle with whom she shares a trust find @ [phone]. As per her uncle no prior SI/SA, takes her meds as ordered and is under care of Dr. Drake for 30 + years and last hospitalization was more than 5 years back. She called EMS through [facility] and she stopped her Klonopin for reasons unknown, which patient later told that she felt drowsy so she stopped her Klonopin. She was advised to take Klonopin only at night. She worked as a PT for many years.    Addendum: Patient was observed for 3 hours after giving her regular AM meds. Mood in control, AAOX3, endorses that she feels much better , not S/h and able to communicate well. She was advised to take her meds regularly, she worked as an occupational therapist for many years. Not S/H and has no perceptual experiences, she os able to ambulate as well with assistance. Spoke with Dr. Drake @ [phone] and informed him of discharge planning and to f/u with Dr. Drake in 5 days after discharge

## 2023-02-25 NOTE — ED PROVIDER NOTE - CLINICAL SUMMARY MEDICAL DECISION MAKING FREE TEXT BOX
77-year-old female history of hypertension high cholesterol diverticulitis presents to the ED with abdominal pain in the setting of eating seeds.  Patient thinks she may have exacerbated diverticulitis.  Exam with mild left lower quadrant tenderness to palpation.  Will CT to rule out diverticulitis lower suspicion UTI.  No chest pain no concern for ACS no signs or concern for PE doubt pneumonia.  Will check labs x-ray urine CT reassess 77-year-old female history of hypertension high cholesterol diverticulitis presents to the ED with abdominal pain in the setting of eating seeds.  Patient thinks she may have exacerbated diverticulitis.  Exam with mild left lower quadrant tenderness to palpation.  Will CT to rule out diverticulitis lower suspicion UTI.  No chest pain no concern for ACS no signs or concern for PE doubt pneumonia.  Will check labs x-ray urine CT reassess    522 - spoke with patients family, Toi. states that patient has a form of schizophrenia and is supposed to be on Klonopin 0.5mg Celexa 40mg abilify 5mg daily, wellbutrin 300mg daily, remeron 30mg daily, toprolol 25mg BID, Zetia 10mg daily but hasn't been taking her meds. States that she is "off" now/decompenstated and this has happened before when not taking her meds. Pts psychiatrist is Dr. Mazin Drake . Here patient is agitated, yelling at staff to wear sterile gloves and irrational. Will place consult for psych and SW. Pt lives alone.

## 2023-02-25 NOTE — ED BEHAVIORAL HEALTH ASSESSMENT NOTE - SUMMARY
Patient a 76 y/o single female, domiciled alone, hx of Schizophrenia/SAD, under care of Dr. Mazin Drake her Psychiatrist for many years, no prior SI/SA, no drug abuse hx, no legal issues, medically has HTN; Hypo-thyroid, was BIB/ENS due to above complaints.    Patient in bed was agitated and was medicated with Haldol 5 mg IM and also received Klonopin 0.5 mg x 1 dose. She was sedated when writer tried to see her and was able to wake her up later on. She is SAAOX3, endorses that she stopped her meds for 2 days for reasons unclear, denied being depressed, not S/h and never tried to commit suicide in the past. She denied A/V/H, was focused on her abdominal pain, and was informed that her CT scan was normal and there are no abnormalities which she accepted. Writer spoke with her Psychiatrist Dr. Drake at [phone] and informed of her stay in ED. He endorses that she looked off and informed of her meds and agreed to give her 1 dose of her regular meds and to observe in ED and if she does well to discharge her home and with f/u with Dr. Drake ASAP. In ED she received a dose of 1000 ml Fluid of Nacl.    Writer also spoke with her uncle with whom she shares a trust find @ [phone]. As per her uncle no prior SI/SA, takes her meds as ordered and is under care of Dr. Drake for 30 + years and last hospitalization was more than 5 years back. She called EMS through [facility] and she stopped her Klonopin for reasons unknown, which patient later told that she felt drowsy so she stopped her Klonopin. She was advised to take Klonopin only at night. She worked as a PT for many years.    Plan: Hold In Ed for reassessment  later

## 2023-02-25 NOTE — ED ADULT TRIAGE NOTE - CHIEF COMPLAINT QUOTE
pt BIBEMS c/o abdominal pain. pt endorses hx of diverticulitis and "ate a lot of flax seeds" today, says abdomen feels "distended". endorses passing gas, last BM today. took mylanta PTA. endorses SOB, 91% on RA.

## 2023-02-25 NOTE — ED BEHAVIORAL HEALTH ASSESSMENT NOTE - DETAILS
ED atttending aware HTN None Hypo-Thyroid ED Team aware ED attending aware Discussed with patient/Psychiatrist

## 2023-02-25 NOTE — ED ADULT TRIAGE NOTE - GLASGOW COMA SCALE: [employer] RESPONSE, MLM
FMLA or Disability forms were received and faxed to the Forms Completion Department today at [phone]. (Please include all appropriate authorization forms with your fax)    Did you have the patient complete (in full) and sign the \"Authorization for Disclosure of Health Information Forms Completion\" form?  No   (if not, please give reason) Received forms via fax    Have you communicated that the forms completion process may take up to 14 days?  No    If you have questions about this encounter, please contact the Forms Completion Department at [phone], ask for Medical Records,  and press option 1 for forms.  
Faxed back    
Form received at [place] on 1/3/2023.     Please note that it takes 7-10 business days for completion.     Signed authorization received with form.  
Given to pcp     
Received signed and completed form from Physician's Office.    Complete form was faxed to [place]  
The form was sent to the Physician's Nurse MSG Pool via In-Basket for review and signature.    Completed form will be faxed to [facility]  
(M6) obeys commands

## 2023-02-25 NOTE — ED BEHAVIORAL HEALTH NOTE - BEHAVIORAL HEALTH NOTE
Psych Sw was informed by Psych MD Pt has outpt appt sometime next week with Psychiatrist Mazin Drake MD  (Address: [address] [place] [address] Phone: [phone]). Pt reports neighbor will provide transportation home as Pt has been cleared to d/c home with appt for services.  Pt agreeable to d/c plan. No further needs. Ed made aware.

## 2023-02-27 ENCOUNTER — EMERGENCY (EMERGENCY)
Facility: HOSPITAL | Age: 78
LOS: 0 days | Discharge: HOME | End: 2023-02-28
Attending: EMERGENCY MEDICINE
Payer: MEDICARE

## 2023-02-27 VITALS
OXYGEN SATURATION: 97 % | DIASTOLIC BLOOD PRESSURE: 78 MMHG | HEART RATE: 95 BPM | RESPIRATION RATE: 19 BRPM | HEIGHT: 64 IN | WEIGHT: 164.91 LBS | TEMPERATURE: 99 F | SYSTOLIC BLOOD PRESSURE: 122 MMHG
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
## 2023-02-27 DIAGNOSIS — I10 ESSENTIAL (PRIMARY) HYPERTENSION: ICD-10-CM

## 2023-02-27 DIAGNOSIS — T43.506A UNDERDOSING OF UNSPECIFIED ANTIPSYCHOTICS AND NEUROLEPTICS, INITIAL ENCOUNTER: ICD-10-CM

## 2023-02-27 DIAGNOSIS — F29 UNSPECIFIED PSYCHOSIS NOT DUE TO A SUBSTANCE OR KNOWN PHYSIOLOGICAL CONDITION: ICD-10-CM

## 2023-02-27 DIAGNOSIS — F32.A DEPRESSION, UNSPECIFIED: ICD-10-CM

## 2023-02-27 DIAGNOSIS — R10.11 RIGHT UPPER QUADRANT PAIN: ICD-10-CM
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
## 2023-02-27 DIAGNOSIS — R82.998 OTHER ABNORMAL FINDINGS IN URINE: ICD-10-CM

## 2023-02-27 DIAGNOSIS — F03.90 UNSPECIFIED DEMENTIA WITHOUT BEHAVIORAL DISTURBANCE: ICD-10-CM

## 2023-02-27 DIAGNOSIS — E78.5 HYPERLIPIDEMIA, UNSPECIFIED: ICD-10-CM

## 2023-02-27 DIAGNOSIS — Z88.8 ALLERGY STATUS TO OTHER DRUGS, MEDICAMENTS AND BIOLOGICAL SUBSTANCES STATUS: ICD-10-CM

## 2023-02-27 DIAGNOSIS — Z88.5 ALLERGY STATUS TO NARCOTIC AGENT: ICD-10-CM

## 2023-02-27 DIAGNOSIS — Z91.14 PATIENT'S OTHER NONCOMPLIANCE WITH MEDICATION REGIMEN: ICD-10-CM

## 2023-02-27 DIAGNOSIS — R45.1 RESTLESSNESS AND AGITATION: ICD-10-CM

## 2023-02-27 DIAGNOSIS — E03.9 HYPOTHYROIDISM, UNSPECIFIED: ICD-10-CM

## 2023-02-27 DIAGNOSIS — F20.9 SCHIZOPHRENIA, UNSPECIFIED: ICD-10-CM

## 2023-02-27 DIAGNOSIS — Z88.0 ALLERGY STATUS TO PENICILLIN: ICD-10-CM

## 2023-02-27 DIAGNOSIS — F05 DELIRIUM DUE TO KNOWN PHYSIOLOGICAL CONDITION: ICD-10-CM

## 2023-02-27 DIAGNOSIS — Z20.822 CONTACT WITH AND (SUSPECTED) EXPOSURE TO COVID-19: ICD-10-CM

## 2023-02-27 LAB
ALBUMIN SERPL ELPH-MCNC: 3.1 G/DL — LOW (ref 3.3–5)
ALP SERPL-CCNC: 49 U/L — SIGNIFICANT CHANGE UP (ref 40–120)
ALT FLD-CCNC: 28 U/L — SIGNIFICANT CHANGE UP (ref 12–78)
ANION GAP SERPL CALC-SCNC: 4 MMOL/L — LOW (ref 5–17)
AST SERPL-CCNC: 32 U/L — SIGNIFICANT CHANGE UP (ref 15–37)
BASOPHILS # BLD AUTO: 0.03 K/UL — SIGNIFICANT CHANGE UP (ref 0–0.2)
BASOPHILS NFR BLD AUTO: 0.7 % — SIGNIFICANT CHANGE UP (ref 0–2)
BILIRUB SERPL-MCNC: 0.4 MG/DL — SIGNIFICANT CHANGE UP (ref 0.2–1.2)
BUN SERPL-MCNC: 18 MG/DL — SIGNIFICANT CHANGE UP (ref 7–23)
CALCIUM SERPL-MCNC: 8.9 MG/DL — SIGNIFICANT CHANGE UP (ref 8.5–10.1)
CHLORIDE SERPL-SCNC: 108 MMOL/L — SIGNIFICANT CHANGE UP (ref 96–108)
CO2 SERPL-SCNC: 26 MMOL/L — SIGNIFICANT CHANGE UP (ref 22–31)
CREAT SERPL-MCNC: 0.86 MG/DL — SIGNIFICANT CHANGE UP (ref 0.5–1.3)
EGFR: 70 ML/MIN/1.73M2 — SIGNIFICANT CHANGE UP
EOSINOPHIL # BLD AUTO: 0.07 K/UL — SIGNIFICANT CHANGE UP (ref 0–0.5)
EOSINOPHIL NFR BLD AUTO: 1.6 % — SIGNIFICANT CHANGE UP (ref 0–6)
FLUAV AG NPH QL: SIGNIFICANT CHANGE UP
FLUBV AG NPH QL: SIGNIFICANT CHANGE UP
GLUCOSE SERPL-MCNC: 93 MG/DL — SIGNIFICANT CHANGE UP (ref 70–99)
HCT VFR BLD CALC: 36.9 % — SIGNIFICANT CHANGE UP (ref 34.5–45)
HGB BLD-MCNC: 12.3 G/DL — SIGNIFICANT CHANGE UP (ref 11.5–15.5)
IMM GRANULOCYTES NFR BLD AUTO: 0.2 % — SIGNIFICANT CHANGE UP (ref 0–0.9)
LIDOCAIN IGE QN: 79 U/L — SIGNIFICANT CHANGE UP (ref 73–393)
LYMPHOCYTES # BLD AUTO: 1.38 K/UL — SIGNIFICANT CHANGE UP (ref 1–3.3)
LYMPHOCYTES # BLD AUTO: 31.6 % — SIGNIFICANT CHANGE UP (ref 13–44)
MCHC RBC-ENTMCNC: 31.6 PG — SIGNIFICANT CHANGE UP (ref 27–34)
MCHC RBC-ENTMCNC: 33.3 GM/DL — SIGNIFICANT CHANGE UP (ref 32–36)
MCV RBC AUTO: 94.9 FL — SIGNIFICANT CHANGE UP (ref 80–100)
MONOCYTES # BLD AUTO: 0.51 K/UL — SIGNIFICANT CHANGE UP (ref 0–0.9)
MONOCYTES NFR BLD AUTO: 11.7 % — SIGNIFICANT CHANGE UP (ref 2–14)
NEUTROPHILS # BLD AUTO: 2.37 K/UL — SIGNIFICANT CHANGE UP (ref 1.8–7.4)
NEUTROPHILS NFR BLD AUTO: 54.2 % — SIGNIFICANT CHANGE UP (ref 43–77)
PLATELET # BLD AUTO: 182 K/UL — SIGNIFICANT CHANGE UP (ref 150–400)
POTASSIUM SERPL-MCNC: 3.7 MMOL/L — SIGNIFICANT CHANGE UP (ref 3.5–5.3)
POTASSIUM SERPL-SCNC: 3.7 MMOL/L — SIGNIFICANT CHANGE UP (ref 3.5–5.3)
PROT SERPL-MCNC: 6.1 GM/DL — SIGNIFICANT CHANGE UP (ref 6–8.3)
RBC # BLD: 3.89 M/UL — SIGNIFICANT CHANGE UP (ref 3.8–5.2)
RBC # FLD: 12.8 % — SIGNIFICANT CHANGE UP (ref 10.3–14.5)
RSV RNA NPH QL NAA+NON-PROBE: SIGNIFICANT CHANGE UP
SARS-COV-2 RNA SPEC QL NAA+PROBE: SIGNIFICANT CHANGE UP
SODIUM SERPL-SCNC: 138 MMOL/L — SIGNIFICANT CHANGE UP (ref 135–145)
WBC # BLD: 4.37 K/UL — SIGNIFICANT CHANGE UP (ref 3.8–10.5)
WBC # FLD AUTO: 4.37 K/UL — SIGNIFICANT CHANGE UP (ref 3.8–10.5)

## 2023-02-27 PROCEDURE — 99291 CRITICAL CARE FIRST HOUR: CPT

## 2023-02-27 PROCEDURE — 0241U: CPT

## 2023-02-27 PROCEDURE — 80053 COMPREHEN METABOLIC PANEL: CPT

## 2023-02-27 PROCEDURE — 99291 CRITICAL CARE FIRST HOUR: CPT | Mod: 25

## 2023-02-27 PROCEDURE — 96372 THER/PROPH/DIAG INJ SC/IM: CPT

## 2023-02-27 PROCEDURE — 81001 URINALYSIS AUTO W/SCOPE: CPT

## 2023-02-27 PROCEDURE — 85025 COMPLETE CBC W/AUTO DIFF WBC: CPT

## 2023-02-27 PROCEDURE — 76705 ECHO EXAM OF ABDOMEN: CPT

## 2023-02-27 PROCEDURE — 87086 URINE CULTURE/COLONY COUNT: CPT

## 2023-02-27 PROCEDURE — 83690 ASSAY OF LIPASE: CPT

## 2023-02-27 PROCEDURE — 76705 ECHO EXAM OF ABDOMEN: CPT | Mod: 26

## 2023-02-27 PROCEDURE — 36415 COLL VENOUS BLD VENIPUNCTURE: CPT

## 2023-02-27 RX ORDER — SODIUM CHLORIDE 9 MG/ML
1000 INJECTION INTRAMUSCULAR; INTRAVENOUS; SUBCUTANEOUS ONCE
Refills: 0 | Status: COMPLETED | OUTPATIENT
Start: 2023-02-27 | End: 2023-02-27

## 2023-02-27 RX ORDER — ARIPIPRAZOLE 15 MG/1
5 TABLET ORAL ONCE
Refills: 0 | Status: COMPLETED | OUTPATIENT
Start: 2023-02-27 | End: 2023-02-28

## 2023-02-27 RX ORDER — BUPROPION HYDROCHLORIDE 150 MG/1
150 TABLET, EXTENDED RELEASE ORAL ONCE
Refills: 0 | Status: COMPLETED | OUTPATIENT
Start: 2023-02-27 | End: 2023-02-28

## 2023-02-27 RX ORDER — HALOPERIDOL DECANOATE 100 MG/ML
5 INJECTION INTRAMUSCULAR ONCE
Refills: 0 | Status: COMPLETED | OUTPATIENT
Start: 2023-02-27 | End: 2023-02-27

## 2023-02-27 RX ADMIN — SODIUM CHLORIDE 1000 MILLILITER(S): 9 INJECTION INTRAMUSCULAR; INTRAVENOUS; SUBCUTANEOUS at 22:06

## 2023-02-27 RX ADMIN — HALOPERIDOL DECANOATE 5 MILLIGRAM(S): 100 INJECTION INTRAMUSCULAR at 20:37

## 2023-02-27 RX ADMIN — Medication 2 MILLIGRAM(S): at 20:37

## 2023-02-27 NOTE — ED PROVIDER NOTE - CRITICAL CARE ATTENDING CONTRIBUTION TO CARE
Elements for critical care include direct patient care (not related to procedure), additional history taking, interpretation of diagnostic studies, documentation, consultation with other physicians, directly relating to pts condition

## 2023-02-27 NOTE — ED PROVIDER NOTE - OBJECTIVE STATEMENT
77 year old female with hx of gallbladder issues, HTN, HLD, depression, dyslexia, diverticulitis, hypothyroid, Schizophrenia/SAD presents to the ED c/o RLQ pain after eating. Denies hx of abd surgery. Patient poor historian, hx limited.

## 2023-02-27 NOTE — ED ADULT TRIAGE NOTE - CHIEF COMPLAINT QUOTE
Patient BIBEMS from home with c/o RLQ abdominal pain that has been going on for 1 month. Pain worsens when sitting and is relieved when standing. Hx of gall bladder issues. Denies chest pain, SOB

## 2023-02-27 NOTE — ED PROVIDER NOTE - PHYSICAL EXAMINATION
General: AA agitated, NAD  HEENT: NCAT  Cardiac: Normal rate and rhythym, no murmurs, normal peripheral perfusion  Respiratory: Normal rate and effort. CTAB  GI: Soft, nondistended, +mild RUQ tenderness  Neuro: No focal deficits. ANTON equally x4, sensation to light touch intact throughout  MSK: FROMx4, no focal bony tenderness, no peripheral edema  Skin: No rash

## 2023-02-27 NOTE — ED PROVIDER NOTE - PROGRESS NOTE DETAILS
Alvaro - pt reported to me she has not been compliant with psych meds at home. Pt required medications to treat acute psychosis for safety of patient as well as staff so received from Dr. John, pt sleeping after getting medicated for behavior, awaiting re evaluation when awakens, may be d/c to home, all labs and radiology negative AYDEE Scott DO Luis Eduardo MOOER: Received s/o from Dr. SHINE; psych consulted 2/2 to patient's known psychiatric hx- cleared for d/c home at this time. UA dirty sample- no complaints- would not treat; urine culture sent. Strict return precautions given.

## 2023-02-27 NOTE — ED PROVIDER NOTE - NS ED ROS FT
Constitutional: No fevers, chills, or sweats.  Cardiac: No chest pain, exertional dyspnea, orthopnea  Respiratory: No shortness of breath, no cough  GI: +RUQ pain, no N/V/D  Neuro: No headaches, no neck pain/stiffness, no numbness  All other systems reviewed and are negative unless otherwise stated in the HPI.

## 2023-02-27 NOTE — ED PROVIDER NOTE - PATIENT PORTAL LINK FT
You can access the FollowMyHealth Patient Portal offered by [facility] by registering at the following website: [email or web address]. By joining [employer]’s FollowMyHealth portal, you will also be able to view your health information using other applications (apps) compatible with our system.

## 2023-02-27 NOTE — ED PROVIDER NOTE - CLINICAL SUMMARY MEDICAL DECISION MAKING FREE TEXT BOX
Patient with above hx pw RUQ abd pain after eating. Pain is intermittent. patient poor historian, hx limited. Mild tenderness on exam. Plan for labs, ultrasound, and reassess. Patient with above hx pw RUQ abd pain after eating. Pain is intermittent. patient poor historian, hx limited. Mild tenderness on exam. Plan for labs, ultrasound, and reassess.    Alvaro ELLER - pt with acute psychosis on arrival with imminent danger to self and to staff requiring immediate intervention, sedation for acute psychosis. Medical workup unremarkable. Transferred to next provider pending reevaluation when awake

## 2023-02-28 VITALS
SYSTOLIC BLOOD PRESSURE: 128 MMHG | HEART RATE: 78 BPM | TEMPERATURE: 98 F | DIASTOLIC BLOOD PRESSURE: 80 MMHG | OXYGEN SATURATION: 99 % | RESPIRATION RATE: 18 BRPM

## 2023-02-28 DIAGNOSIS — F05 DELIRIUM DUE TO KNOWN PHYSIOLOGICAL CONDITION: ICD-10-CM

## 2023-02-28 LAB
APPEARANCE UR: CLEAR — SIGNIFICANT CHANGE UP
BILIRUB UR-MCNC: NEGATIVE — SIGNIFICANT CHANGE UP
COLOR SPEC: YELLOW — SIGNIFICANT CHANGE UP
DIFF PNL FLD: ABNORMAL
GLUCOSE UR QL: NEGATIVE — SIGNIFICANT CHANGE UP
KETONES UR-MCNC: ABNORMAL
LEUKOCYTE ESTERASE UR-ACNC: ABNORMAL
NITRITE UR-MCNC: NEGATIVE — SIGNIFICANT CHANGE UP
PH UR: 5 — SIGNIFICANT CHANGE UP (ref 5–8)
PROT UR-MCNC: NEGATIVE — SIGNIFICANT CHANGE UP
SP GR SPEC: 1.01 — SIGNIFICANT CHANGE UP (ref 1.01–1.02)
UROBILINOGEN FLD QL: NEGATIVE — SIGNIFICANT CHANGE UP

## 2023-02-28 RX ORDER — METOPROLOL TARTRATE 50 MG
1 TABLET ORAL
Qty: 0 | Refills: 0 | DISCHARGE

## 2023-02-28 RX ORDER — CLONAZEPAM 1 MG
1 TABLET ORAL
Qty: 0 | Refills: 0 | DISCHARGE

## 2023-02-28 RX ORDER — EZETIMIBE 10 MG/1
1 TABLET ORAL
Qty: 0 | Refills: 0 | DISCHARGE

## 2023-02-28 RX ORDER — CLONAZEPAM 1 MG
0.5 TABLET ORAL
Refills: 0 | Status: DISCONTINUED | OUTPATIENT
Start: 2023-02-28 | End: 2023-02-28

## 2023-02-28 RX ORDER — THYROID 120 MG
1 TABLET ORAL
Qty: 0 | Refills: 0 | DISCHARGE

## 2023-02-28 RX ORDER — ARIPIPRAZOLE 15 MG/1
1 TABLET ORAL
Qty: 0 | Refills: 0 | DISCHARGE

## 2023-02-28 RX ORDER — CITALOPRAM 10 MG/1
1 TABLET, FILM COATED ORAL
Qty: 0 | Refills: 0 | DISCHARGE

## 2023-02-28 RX ORDER — KETOCONAZOLE 20 MG/G
1 AEROSOL, FOAM TOPICAL
Qty: 0 | Refills: 0 | DISCHARGE

## 2023-02-28 RX ORDER — FUROSEMIDE 40 MG
1 TABLET ORAL
Qty: 0 | Refills: 0 | DISCHARGE

## 2023-02-28 RX ADMIN — Medication 0.5 MILLIGRAM(S): at 11:50

## 2023-02-28 RX ADMIN — ARIPIPRAZOLE 5 MILLIGRAM(S): 15 TABLET ORAL at 07:36

## 2023-02-28 RX ADMIN — BUPROPION HYDROCHLORIDE 150 MILLIGRAM(S): 150 TABLET, EXTENDED RELEASE ORAL at 07:36

## 2023-02-28 NOTE — ED ADULT NURSE NOTE - CAS EDN DISCHARGE INTERVENTIONS
[FreeTextEntry1] : MARILYN MORELOS is a 66 year old male prediabetic, who presents for consultation for BPH/LUTS on rapaflo/finasteride previously seen by outside urologist. Patient has been on finasteride since 2018. PSA 4.96 yielding a PSA density of 0.101 from 10/2021. \par \par Pt reports he is experiencing urinary frequency during the day and night at this time. Reports he is continuing taking Rapaflo and finasteride. States he is having a good force of stream. Pt reports no hesitancy at this time. \par Pt reports he was started on Myrbetriq in the past and reports he had difficulty urinating. Pt self d/c Myrbetriq. \par Denies any gross hematuria or dysuria, states he is voiding completely. \par Patient reports rare episodes of urge incontinence\par \par PSA=2.26, % free = 33%  from 2/2022 \par \par PSA= 1.7 percent free 41 % from 2/24/2022 \par \par Previously: \par Most recent PSA 2.48 (4.96, corrected for finasteride)\par \par MRI prostate images demonstrating 49 g prostate with mild intravesical protrusion no obvious T2 peripheral zone lesions however significant artifact from hip prosthesis limits evaluation for clinically significant prostate cancer as diffusion-weighted imaging is limited.\par \par Bladder ultrasound images from 10/26/2021 demonstrates a 43 g prostate with a prevoid volume of 170 cc and the postvoid volume of 45g,  yielding a PSA density of 0.115, intravesical mild wide protrusion\par \par Denies  PMH including previous kidney stones, recurrent UTIs. \par Family History: No  malignancies. Brother had TURP procedure. \par Social History: School safety. Pt reports he is not consuming bladder irritants. \par \par PSA=3.0  percent free 30 from 12/2020 \par PSA=4.5  11/ 2018 \par PSA=6  10/ 2018 \par \par Prostate TRUS: 45 grams prostate from 2014 \par \par UDS from 2018 demonstrates: normal Bladder compliance, max V=121 cc. Voiding phase, max detrusor pressure 194 cc, Qmax= 8 ml/sec. Tracing were reviewed: there is a sustained detrusor contraction, machine does not appear to be completely calibrated, somewhat difficult to interpret. \par \par RBUS images 7/2020: no hydronephrosis bL no stones BL, bladder unremarkable KVJ=138 cc. \par Prostate V not measured however appears to have intravesical protrusion. \par \par 
IV discontinued, cath removed intact

## 2023-02-28 NOTE — ED BEHAVIORAL HEALTH ASSESSMENT NOTE - HPI (INCLUDE ILLNESS QUALITY, SEVERITY, DURATION, TIMING, CONTEXT, MODIFYING FACTORS, ASSOCIATED SIGNS AND SYMPTOMS)
Patient a 78 y/o single female, domiciled alone, ? hx of Schizophrenia/SAD, under care of Dr. Mazin Drake her Psychiatrist for many years, no prior SI/SA, no drug abuse hx, no legal issues, medically has HTN; Hypo-thyroid, was BIB/ENS due to above complaints. PT also has hx fo delirium and per records "reported hx fo dementia". It si not cleared if dementia dg was ever established.  The pt was in  ED on 2/25 - assessed by Dr Zepeda and d/c home with f/u with Dr Drake. PT was in ED for abdominal pain and today presents with same complaints.   On 25th Patient  was agitated and was medicated with Haldol 5 mg IM and also received Klonopin 0.5 mg x 1 dose. The same happened last night.   This morning  she  endorses that she stopped her meds if she mendez not have them but now taking them again.   Denied being depressed, not S/H and never tried to commit suicide in the past. She denied A/V/H, was focused on her abdominal pain. Writer spoke with aftab Cm who has no safety complaints and  reports pt has no hx fo SA or SI. never hospitalised. Left a message for  her Psychiatrist Dr. Drake at [phone]     Per [id number] for 2 days ago  no prior SI/SA, takes her meds as ordered and is under care of Dr. Drake for 30 + years and last hospitalization was more than 5 years back. She called EMS through [facility] and she stopped her Klonopin for reasons unknown, which patient later told that she felt drowsy so she stopped her Klonopin. She was advised to take Klonopin only at night. She worked as a PT for many years.    U/A was negative on 25 and today moderate leukoprotease.   Pt states she wasts to hire an aide and need help with that.

## 2023-02-28 NOTE — ED BEHAVIORAL HEALTH ASSESSMENT NOTE - SUMMARY
Patient a 78 y/o single female, domiciled alone, ? hx of Schizophrenia/SAD, under care of Dr. Mazin Drake her Psychiatrist for many years, no prior SI/SA, no drug abuse hx, no legal issues, medically has HTN; Hypo-thyroid, was BIB/ENS due to above complaints. PT also has hx fo delirium and per records "reported hx fo dementia". It si not cleared if dementia dg was ever established.  The pt was in  ED on 2/25 - assessed by Dr Zepeda and d/c home with f/u with Dr Drake. PT was in ED for abdominal pain and today presents with same complaints.   On 25th Patient  was agitated and was medicated with Haldol 5 mg IM and also received Klonopin 0.5 mg x 1 dose. The same happened last night.   This morning  she  endorses that she stopped her meds if she mendez not have them but now taking them again.   Denied being depressed, not S/H and never tried to commit suicide in the past. She denied A/V/H, was focused on her abdominal pain. Writer spoke with aftab Cm who has no safety complaints and  reports pt has no hx fo SA or SI. never hospitalised. Left a message for  her Psychiatrist Dr. Drake at [phone]   Per [id number] for 2 days ago  no prior SI/SA, takes her meds as ordered and is under care of Dr. Drake for 30 + years and last hospitalization was more than 5 years back. She called EMS through [facility] and she stopped her Klonopin for reasons unknown, which patient later told that she felt drowsy so she stopped her Klonopin. She was advised to take Klonopin only at night. She worked as a PT for many years.  U/A was negative on 25 and today moderate leukoprotease.   Pt states she wants to hire an aide and need help with that.    Pt current presentation is likely related to UTI and delirium.   She is on 3 antidepressant and would recommend d/ one of them and I would leave that choice up to her outside provider Dr Drake.     PT does not need inpatient psych admission and she can continue to see Dr Drake in community.

## 2023-02-28 NOTE — ED BEHAVIORAL HEALTH ASSESSMENT NOTE - CURRENT PASSIVE IDEATION:
Problem: Patient Care Overview  Goal: Plan of Care Review  Outcome: Ongoing (interventions implemented as appropriate)  A&Ox3. Continent of b&b, ambulates to bathroom. C/o pain to LL abd, good pain control with IV meds. C/o nausea prn phenergan supp adm, med effective. Cont on IV fluids. IV abt in progress, afebrile. Remains free from falls. Bed in low position, call light within reach. Verbalized understanding of plan of care.        None known

## 2023-02-28 NOTE — ED BEHAVIORAL HEALTH ASSESSMENT NOTE - DESCRIPTION
Single female, domiciled alone HTN; Hypo-Thyroid PT received haldol 5 and ativan 2 mg last night aground 20:00h with good results. This morning she is calm and cooperative.    Home Medications:  ARIPiprazole 5 mg oral tablet: 1 tab(s) orally once a day (28 Feb 2023 10:02)  buPROPion 300 mg/24 hours (XL) oral tablet, extended release: 1 tab(s) orally every 24 hours (28 Feb 2023 10:02)  citalopram 40 mg oral tablet: 1 tab(s) orally once a day (28 Feb 2023 10:02)  clonazePAM 0.5 mg oral tablet: 1 tab(s) orally 3 times a day, As Needed (28 Feb 2023 10:02)  ezetimibe 10 mg oral tablet: 1 tab(s) orally once a day (28 Feb 2023 10:02)  furosemide 20 mg oral tablet: 1 tab(s) orally once a day (28 Feb 2023 10:02)  metoprolol succinate 25 mg oral tablet, extended release: 1 tab(s) orally 2 times a day  (28 Feb 2023 10:02)  mirtazapine 30 mg oral tablet: 1 tab(s) orally once a day (at bedtime) (28 Feb 2023 10:02)  thyroid desiccated 120 mg oral tablet: 1 tab(s) orally once a day    **total daily dose 150 mg** (28 Feb 2023 10:02)  thyroid desiccated 30 mg oral tablet: 1 tab(s) orally once a day (28 Feb 2023 10:02)  triamcinolone 0.1% topical cream: Apply topically to affected area as directed (28 Feb 2023 10:01)

## 2023-02-28 NOTE — PHARMACOTHERAPY INTERVENTION NOTE - COMMENTS
Med history complete, patient unable to provide medication history, called patient primary care contact with no answer, confirmed medication list with doctor [facility] med profile and [facility] records

## 2023-02-28 NOTE — ED BEHAVIORAL HEALTH ASSESSMENT NOTE - CURRENT MEDICATION
Dr Palacio:   ARIPiprazole 5 mg oral tablet: 1 tab(s) orally once a day (28 Feb 2023 10:02)  buPROPion 300 mg/24 hours (XL) oral tablet, extended release: 1 tab(s) orally every 24 hours (28 Feb 2023 10:02)  citalopram 40 mg oral tablet: 1 tab(s) orally once a day (28 Feb 2023 10:02)  clonazePAM 0.5 mg oral tablet: 1 tab(s) orally 3 times a day, As Needed (28 Feb 2023 10:0

## 2023-02-28 NOTE — ED BEHAVIORAL HEALTH ASSESSMENT NOTE - DETAILS
Coordinated with Dr Hoff. None Hypo-Thyroid HTN Discussed with patient and nephew. Psychiatrist did not return call.

## 2023-03-01 ENCOUNTER — EMERGENCY (EMERGENCY)
Facility: HOSPITAL | Age: 78
LOS: 0 days | Discharge: HOME | End: 2023-03-02
Attending: EMERGENCY MEDICINE
Payer: MEDICARE

## 2023-03-01 VITALS
RESPIRATION RATE: 16 BRPM | HEIGHT: 64 IN | OXYGEN SATURATION: 95 % | DIASTOLIC BLOOD PRESSURE: 81 MMHG | TEMPERATURE: 98 F | HEART RATE: 102 BPM | SYSTOLIC BLOOD PRESSURE: 148 MMHG

## 2023-03-01 DIAGNOSIS — Z88.0 ALLERGY STATUS TO PENICILLIN: ICD-10-CM

## 2023-03-01 DIAGNOSIS — Q43.8 OTHER SPECIFIED CONGENITAL MALFORMATIONS OF INTESTINE: ICD-10-CM

## 2023-03-01 DIAGNOSIS — R10.31 RIGHT LOWER QUADRANT PAIN: ICD-10-CM

## 2023-03-01 DIAGNOSIS — Z20.822 CONTACT WITH AND (SUSPECTED) EXPOSURE TO COVID-19: ICD-10-CM

## 2023-03-01 DIAGNOSIS — I10 ESSENTIAL (PRIMARY) HYPERTENSION: ICD-10-CM

## 2023-03-01 DIAGNOSIS — F03.90 UNSPECIFIED DEMENTIA, UNSPECIFIED SEVERITY, WITHOUT BEHAVIORAL DISTURBANCE, PSYCHOTIC DISTURBANCE, MOOD DISTURBANCE, AND ANXIETY: ICD-10-CM

## 2023-03-01 DIAGNOSIS — Z88.5 ALLERGY STATUS TO NARCOTIC AGENT: ICD-10-CM

## 2023-03-01 DIAGNOSIS — F20.9 SCHIZOPHRENIA, UNSPECIFIED: ICD-10-CM

## 2023-03-01 DIAGNOSIS — E78.5 HYPERLIPIDEMIA, UNSPECIFIED: ICD-10-CM

## 2023-03-01 DIAGNOSIS — R41.82 ALTERED MENTAL STATUS, UNSPECIFIED: ICD-10-CM

## 2023-03-01 DIAGNOSIS — Z87.19 PERSONAL HISTORY OF OTHER DISEASES OF THE DIGESTIVE SYSTEM: ICD-10-CM

## 2023-03-01 DIAGNOSIS — R48.0 DYSLEXIA AND ALEXIA: ICD-10-CM

## 2023-03-01 DIAGNOSIS — E03.9 HYPOTHYROIDISM, UNSPECIFIED: ICD-10-CM

## 2023-03-01 DIAGNOSIS — K63.89 OTHER SPECIFIED DISEASES OF INTESTINE: ICD-10-CM

## 2023-03-01 DIAGNOSIS — F32.A DEPRESSION, UNSPECIFIED: ICD-10-CM

## 2023-03-01 LAB
ADD ON TEST-SPECIMEN IN LAB: SIGNIFICANT CHANGE UP
ALBUMIN SERPL ELPH-MCNC: 3.3 G/DL — SIGNIFICANT CHANGE UP (ref 3.3–5)
ALP SERPL-CCNC: 55 U/L — SIGNIFICANT CHANGE UP (ref 40–120)
ALT FLD-CCNC: 29 U/L — SIGNIFICANT CHANGE UP (ref 12–78)
ANION GAP SERPL CALC-SCNC: 4 MMOL/L — LOW (ref 5–17)
APAP SERPL-MCNC: <2 UG/ML — LOW (ref 10–30)
AST SERPL-CCNC: 26 U/L — SIGNIFICANT CHANGE UP (ref 15–37)
BASOPHILS # BLD AUTO: 0.02 K/UL — SIGNIFICANT CHANGE UP (ref 0–0.2)
BASOPHILS NFR BLD AUTO: 0.3 % — SIGNIFICANT CHANGE UP (ref 0–2)
BILIRUB SERPL-MCNC: 0.5 MG/DL — SIGNIFICANT CHANGE UP (ref 0.2–1.2)
BUN SERPL-MCNC: 14 MG/DL — SIGNIFICANT CHANGE UP (ref 7–23)
CALCIUM SERPL-MCNC: 9.4 MG/DL — SIGNIFICANT CHANGE UP (ref 8.5–10.1)
CHLORIDE SERPL-SCNC: 107 MMOL/L — SIGNIFICANT CHANGE UP (ref 96–108)
CK SERPL-CCNC: 210 U/L — HIGH (ref 26–192)
CO2 SERPL-SCNC: 26 MMOL/L — SIGNIFICANT CHANGE UP (ref 22–31)
CREAT SERPL-MCNC: 0.77 MG/DL — SIGNIFICANT CHANGE UP (ref 0.5–1.3)
CULTURE RESULTS: SIGNIFICANT CHANGE UP
EGFR: 80 ML/MIN/1.73M2 — SIGNIFICANT CHANGE UP
EOSINOPHIL # BLD AUTO: 0.03 K/UL — SIGNIFICANT CHANGE UP (ref 0–0.5)
EOSINOPHIL NFR BLD AUTO: 0.5 % — SIGNIFICANT CHANGE UP (ref 0–6)
ETHANOL SERPL-MCNC: <10 MG/DL — SIGNIFICANT CHANGE UP (ref 0–10)
GLUCOSE SERPL-MCNC: 102 MG/DL — HIGH (ref 70–99)
HCT VFR BLD CALC: 42.6 % — SIGNIFICANT CHANGE UP (ref 34.5–45)
HGB BLD-MCNC: 14.6 G/DL — SIGNIFICANT CHANGE UP (ref 11.5–15.5)
IMM GRANULOCYTES NFR BLD AUTO: 0.3 % — SIGNIFICANT CHANGE UP (ref 0–0.9)
LACTATE SERPL-SCNC: 0.6 MMOL/L — LOW (ref 0.7–2)
LYMPHOCYTES # BLD AUTO: 0.88 K/UL — LOW (ref 1–3.3)
LYMPHOCYTES # BLD AUTO: 15.1 % — SIGNIFICANT CHANGE UP (ref 13–44)
MCHC RBC-ENTMCNC: 31.9 PG — SIGNIFICANT CHANGE UP (ref 27–34)
MCHC RBC-ENTMCNC: 34.3 GM/DL — SIGNIFICANT CHANGE UP (ref 32–36)
MCV RBC AUTO: 93.2 FL — SIGNIFICANT CHANGE UP (ref 80–100)
MONOCYTES # BLD AUTO: 0.5 K/UL — SIGNIFICANT CHANGE UP (ref 0–0.9)
MONOCYTES NFR BLD AUTO: 8.6 % — SIGNIFICANT CHANGE UP (ref 2–14)
NEUTROPHILS # BLD AUTO: 4.37 K/UL — SIGNIFICANT CHANGE UP (ref 1.8–7.4)
NEUTROPHILS NFR BLD AUTO: 75.2 % — SIGNIFICANT CHANGE UP (ref 43–77)
PLATELET # BLD AUTO: 191 K/UL — SIGNIFICANT CHANGE UP (ref 150–400)
POTASSIUM SERPL-MCNC: 4.2 MMOL/L — SIGNIFICANT CHANGE UP (ref 3.5–5.3)
POTASSIUM SERPL-SCNC: 4.2 MMOL/L — SIGNIFICANT CHANGE UP (ref 3.5–5.3)
PROT SERPL-MCNC: 6.7 GM/DL — SIGNIFICANT CHANGE UP (ref 6–8.3)
RAPID RVP RESULT: SIGNIFICANT CHANGE UP
RBC # BLD: 4.57 M/UL — SIGNIFICANT CHANGE UP (ref 3.8–5.2)
RBC # FLD: 12.7 % — SIGNIFICANT CHANGE UP (ref 10.3–14.5)
SALICYLATES SERPL-MCNC: 3.4 MG/DL — SIGNIFICANT CHANGE UP (ref 2.8–20)
SARS-COV-2 RNA SPEC QL NAA+PROBE: SIGNIFICANT CHANGE UP
SODIUM SERPL-SCNC: 137 MMOL/L — SIGNIFICANT CHANGE UP (ref 135–145)
SPECIMEN SOURCE: SIGNIFICANT CHANGE UP
TSH SERPL-MCNC: 0.58 UU/ML — SIGNIFICANT CHANGE UP (ref 0.34–4.82)
WBC # BLD: 5.82 K/UL — SIGNIFICANT CHANGE UP (ref 3.8–10.5)
WBC # FLD AUTO: 5.82 K/UL — SIGNIFICANT CHANGE UP (ref 3.8–10.5)

## 2023-03-01 PROCEDURE — 36415 COLL VENOUS BLD VENIPUNCTURE: CPT

## 2023-03-01 PROCEDURE — 0225U NFCT DS DNA&RNA 21 SARSCOV2: CPT

## 2023-03-01 PROCEDURE — 99285 EMERGENCY DEPT VISIT HI MDM: CPT | Mod: 25

## 2023-03-01 PROCEDURE — 71045 X-RAY EXAM CHEST 1 VIEW: CPT

## 2023-03-01 PROCEDURE — 82962 GLUCOSE BLOOD TEST: CPT

## 2023-03-01 PROCEDURE — 70450 CT HEAD/BRAIN W/O DYE: CPT | Mod: 26,MA

## 2023-03-01 PROCEDURE — 71045 X-RAY EXAM CHEST 1 VIEW: CPT | Mod: 26

## 2023-03-01 PROCEDURE — 83605 ASSAY OF LACTIC ACID: CPT

## 2023-03-01 PROCEDURE — 70450 CT HEAD/BRAIN W/O DYE: CPT | Mod: MA

## 2023-03-01 PROCEDURE — 96376 TX/PRO/DX INJ SAME DRUG ADON: CPT

## 2023-03-01 PROCEDURE — 96374 THER/PROPH/DIAG INJ IV PUSH: CPT | Mod: XU

## 2023-03-01 PROCEDURE — 84443 ASSAY THYROID STIM HORMONE: CPT

## 2023-03-01 PROCEDURE — 85025 COMPLETE CBC W/AUTO DIFF WBC: CPT

## 2023-03-01 PROCEDURE — 74177 CT ABD & PELVIS W/CONTRAST: CPT | Mod: 26,MA

## 2023-03-01 PROCEDURE — 74177 CT ABD & PELVIS W/CONTRAST: CPT | Mod: MA

## 2023-03-01 PROCEDURE — 87186 SC STD MICRODIL/AGAR DIL: CPT

## 2023-03-01 PROCEDURE — 80053 COMPREHEN METABOLIC PANEL: CPT

## 2023-03-01 PROCEDURE — 81001 URINALYSIS AUTO W/SCOPE: CPT

## 2023-03-01 PROCEDURE — 82550 ASSAY OF CK (CPK): CPT

## 2023-03-01 PROCEDURE — 99285 EMERGENCY DEPT VISIT HI MDM: CPT

## 2023-03-01 PROCEDURE — 80307 DRUG TEST PRSMV CHEM ANLYZR: CPT

## 2023-03-01 PROCEDURE — 87086 URINE CULTURE/COLONY COUNT: CPT

## 2023-03-01 RX ORDER — KETOROLAC TROMETHAMINE 30 MG/ML
15 SYRINGE (ML) INJECTION ONCE
Refills: 0 | Status: DISCONTINUED | OUTPATIENT
Start: 2023-03-01 | End: 2023-03-01

## 2023-03-01 RX ADMIN — Medication 15 MILLIGRAM(S): at 23:26

## 2023-03-01 NOTE — ED PROVIDER NOTE - PHYSICAL EXAMINATION
Constitutional: NAD AOx1 slow to respond   Eyes: PERRL EOMI  Head: Normocephalic atraumatic  Mouth: MMM  Cardiac: regular rate and rhythm  Resp: Lungs CTAB  GI: Abd s/nd/nt  Neuro: CN2-12 grossly intact, ANTON x 4  psych denies hi/si  Extrem: b/l symmetric pitting edema with erythema, no warmth , from above ankle to below knee  Skin: No visible rashes

## 2023-03-01 NOTE — ED PROVIDER NOTE - CARE PROVIDER_API CALL
Daniel Kahn; PhD)  Neurosurgery  [address] [facility], [address] [place] [address]  Phone: [phone]  Fax: [phone]  Follow Up Time: 1-3 Days

## 2023-03-01 NOTE — ED PROVIDER NOTE - OBJECTIVE STATEMENT
76 yo F from home HTN, HLD, depression, dyslexia, diverticulitis, hypothyroid, Schizophrenia/SAD BIBEMS after a well check. Pt found with AMS sitting in chair.  pt repeats phraases but also answers some quesiotns. c/o rlq abd pain. no ha no cp  no sob, unknown if took her meds today or if she ate today. denies head injury. not on blood thinners, denies alcohol. Here for similar 2/27 and 2/25.

## 2023-03-01 NOTE — ED PROVIDER NOTE - NSFOLLOWUPCLINICS_GEN_ALL_ED_FT
[facility]  Family Medicine  [address] [place] [address]  Phone: [phone]  Fax:

## 2023-03-01 NOTE — ED PROVIDER NOTE - PATIENT PORTAL LINK FT
You can access the FollowMyHealth Patient Portal offered by [facility] by registering at the following website: [email or web address]. By joining [employer]’s FollowMyHealth portal, you will also be able to view your health information using other applications (apps) compatible with our system.

## 2023-03-01 NOTE — ED PROVIDER NOTE - CLINICAL SUMMARY MEDICAL DECISION MAKING FREE TEXT BOX
plan for medical clearance for psych eval. recent 2 medical work ups without acute findings. pt still has RLQ pain. plan for medical clearance for psych eval. recent 2 medical work ups without acute findings. pt still has RLQ pain.    see progress notes for further MDM details plan for medical clearance for psych eval. recent 2 medical work ups without acute findings. pt still has RLQ pain.    see progress notes for further MDM details    Alvaro ELLER - pt with acute psychosis on arrival with imminent danger to self and to staff requiring immediate intervention, sedation for acute psychosis. Medical workup unremarkable. Transferred to next provider pending reevaluation when awake

## 2023-03-01 NOTE — ED ADULT TRIAGE NOTE - CHIEF COMPLAINT QUOTE
pt presents to ED from home. neighbors called for wellness check. no LKW. pt found in house in chair. pt with AMS. pt repeats questions back when asked. states name. baseline mental status unknown. medical hx unknown. bgm performed in triage.

## 2023-03-01 NOTE — ED PROVIDER NOTE - PROGRESS NOTE DETAILS
d/w pt's emmanuel Cm [phone] MD BRANDIE d/w pt's nephew Toi [phone] . pt not herself this week. lives alone independently. usually up and walking 2 miles a day. administers her own meds. meds reconciled on previous ed admit with dr child and dr padron.  friends came by today and noted her confusion and called 911.  unknown if pt has been eating. MD BRANDIE pt with epiploic appnedagitis on ct abd . no intervention besides nsaids. not seen on ct on 2/25/23. abd remains benign in ED. Could be cause of pt's delerium? UA pending from today. neg last 2 days. also with NPH on ct head, however no change from prior. d/w hospitlaist , recommend hold in ED for SW for possible placement in the AM. No psych consult on this visit, pt calm, cooperative, and confused. last 2 ed visits had a psych consult when pt belligerent and aggressive in ED. meds were managed and she was sent home. will place SW consult for AM. MD BRANDIE pt ambulates with steady gait, sw discussed with fam will dc, refer to f/u with nsg for poss NPH    ED evaluation and management discussed with the patient and family (if available) in detail.  Close PMD follow up encouraged.  Strict ED return instructions discussed in detail and patient given the opportunity to ask any questions about their discharge diagnosis and instructions. Patient verbalized understanding.

## 2023-03-02 VITALS
HEART RATE: 97 BPM | SYSTOLIC BLOOD PRESSURE: 143 MMHG | DIASTOLIC BLOOD PRESSURE: 84 MMHG | TEMPERATURE: 98 F | OXYGEN SATURATION: 94 % | RESPIRATION RATE: 16 BRPM

## 2023-03-02 LAB
AMPHET UR-MCNC: NEGATIVE — SIGNIFICANT CHANGE UP
APPEARANCE UR: CLEAR — SIGNIFICANT CHANGE UP
BACTERIA # UR AUTO: ABNORMAL
BARBITURATES UR SCN-MCNC: NEGATIVE — SIGNIFICANT CHANGE UP
BENZODIAZ UR-MCNC: NEGATIVE — SIGNIFICANT CHANGE UP
BILIRUB UR-MCNC: NEGATIVE — SIGNIFICANT CHANGE UP
COCAINE METAB.OTHER UR-MCNC: NEGATIVE — SIGNIFICANT CHANGE UP
COLOR SPEC: YELLOW — SIGNIFICANT CHANGE UP
DIFF PNL FLD: NEGATIVE — SIGNIFICANT CHANGE UP
EPI CELLS # UR: SIGNIFICANT CHANGE UP
GLUCOSE UR QL: NEGATIVE — SIGNIFICANT CHANGE UP
KETONES UR-MCNC: ABNORMAL
LEUKOCYTE ESTERASE UR-ACNC: ABNORMAL
METHADONE UR-MCNC: NEGATIVE — SIGNIFICANT CHANGE UP
NITRITE UR-MCNC: NEGATIVE — SIGNIFICANT CHANGE UP
OPIATES UR-MCNC: NEGATIVE — SIGNIFICANT CHANGE UP
PCP SPEC-MCNC: SIGNIFICANT CHANGE UP
PCP UR-MCNC: NEGATIVE — SIGNIFICANT CHANGE UP
PH UR: 5 — SIGNIFICANT CHANGE UP (ref 5–8)
PROT UR-MCNC: NEGATIVE — SIGNIFICANT CHANGE UP
RBC CASTS # UR COMP ASSIST: ABNORMAL /HPF (ref 0–4)
SP GR SPEC: 1.01 — SIGNIFICANT CHANGE UP (ref 1.01–1.02)
THC UR QL: NEGATIVE — SIGNIFICANT CHANGE UP
UROBILINOGEN FLD QL: NEGATIVE — SIGNIFICANT CHANGE UP
WBC UR QL: SIGNIFICANT CHANGE UP /HPF (ref 0–5)

## 2023-03-02 RX ORDER — KETOROLAC TROMETHAMINE 30 MG/ML
15 SYRINGE (ML) INJECTION ONCE
Refills: 0 | Status: DISCONTINUED | OUTPATIENT
Start: 2023-03-02 | End: 2023-03-02

## 2023-03-02 RX ORDER — CLONAZEPAM 1 MG
0.5 TABLET ORAL ONCE
Refills: 0 | Status: DISCONTINUED | OUTPATIENT
Start: 2023-03-02 | End: 2023-03-02

## 2023-03-02 RX ORDER — ARIPIPRAZOLE 15 MG/1
5 TABLET ORAL ONCE
Refills: 0 | Status: COMPLETED | OUTPATIENT
Start: 2023-03-02 | End: 2023-03-02

## 2023-03-02 RX ORDER — CITALOPRAM 10 MG/1
40 TABLET, FILM COATED ORAL ONCE
Refills: 0 | Status: COMPLETED | OUTPATIENT
Start: 2023-03-02 | End: 2023-03-02

## 2023-03-02 RX ADMIN — Medication 0.5 MILLIGRAM(S): at 15:04

## 2023-03-02 RX ADMIN — Medication 15 MILLIGRAM(S): at 14:59

## 2023-03-02 RX ADMIN — CITALOPRAM 40 MILLIGRAM(S): 10 TABLET, FILM COATED ORAL at 15:41

## 2023-03-02 RX ADMIN — ARIPIPRAZOLE 5 MILLIGRAM(S): 15 TABLET ORAL at 14:59

## 2023-03-02 NOTE — ED ADULT NURSE NOTE - NS ED NOTE ABUSE RESPONSE YN
Bernarda Lizama  January 17, 2023  Plan of Care Hand-off Note     Patient Care Path: Inpatient Mental Health    Plan for Care:     As patient continues to endorse suicidal ideation and her ongoing impulsive behaviors continue to place her at increased risk of harm she will be hospitalized for stabilization.  Patient is not currently engaging in any mental health treatment and will require medication management along with stabilization to encourage stability in the community.  Patient notes persistent suicidal ideation with recent impulsive actions which could lead to serious injury or death.  Patient is willing to engage in treatment but notes that she is unable to remain in safe in an outpatient setting at this time.      Critical Safety Issues: Impulsive self harm    Overview:  This patient is a child/adolescent: No    This patient has additional special visitor precautions: No    Legal Status: Voluntary    Contacts:   Low Lizama, Sister: Contact [phone]    Psychiatry Consult:  Psychiatry Consult not requested because Patient will require inpatient hospitalization    Updated Attending Provider regarding plan of care.    Mell Dawn, DEMETRIASW      
Yes

## 2023-03-02 NOTE — ED BEHAVIORAL HEALTH ASSESSMENT NOTE - HPI (INCLUDE ILLNESS QUALITY, SEVERITY, DURATION, TIMING, CONTEXT, MODIFYING FACTORS, ASSOCIATED SIGNS AND SYMPTOMS)
Patient a 76 y/o single female, domiciled alone, hx of Schizophrenia/SAD, under care of Dr. Mazin Drake her Psychiatrist for many years, no prior SI/SA, no drug abuse hx, no legal issues, medically has HTN; Hypo-thyroid. PT also has hx fo delirium and per records "reported hx fo dementia". It si not cleared if dementia dg was ever established. The pt was in  ED on 2/25 - assessed by Dr Zepeda and 2/27 assessed by Dr. Castillo and d/c home with f/u with Dr Drake. Denied being depressed, not S/H and never tried to commit suicide in the past. She denied A/V/H. Reports she is in the process of working with Dr. Drake to arrange for live in help with ADL's etc, as she does admit she could use help tending to such needs at her age. Writer spoke with nephew Toi who has no safety complaints and reports pt has no hx fo SA or SI.  Left a message for  her Psychiatrist Dr. Drake at [phone]

## 2023-03-02 NOTE — ED ADULT NURSE NOTE - DOES PATIENT HAVE ADVANCE DIRECTIVE
Phu Navarro is a 44 year old female presenting for   Chief Complaint   Patient presents with   • Follow-up     anxiety and anemia   Electric scooter-needs new order.   Denies Latex allergy or sensitivity.    Medication verified, no changes  Refills needed today: No    Health Maintenance Due   Topic Date Due   • Pneumococcal Vaccine 0-64 (1 of 4 - PCV13) Never done   • COVID-19 Vaccine (1) Never done   • Diabetes Foot Exam  Never done   • Hepatitis B Vaccine (1 of 3 - Risk 3-dose series) Never done   • Diabetes Eye Exam  10/11/2017   • DTaP/Tdap/Td Vaccine (1 - Tdap) 04/09/2021   • Medicare Advantage- Medicare Wellness Visit  01/01/2022       Patient is due for topics as listed above but is not proceeding with Immunization(s) COVID-19, Dtap/Tdap/Td and Pneumococcal, Diabetes Eye Exam, Diabetes Foot Exam and MWV (Medicare Wellness Visit) at this time. Will discuss with Dr. Gaines.       Unaddressed Risk Adjusted [facility] Categories and Diagnoses  HCC 18 - Diabetes with Chronic Complications  - Unaddressed Dx:Type 2 Diabetes Mellitus With Morbid Obesity (Cms/[facility])  HCC 48 - Coagulation Defects and Other Specified Hematological Disorders  - Unaddressed Dx:Hypercoagulable State (Cms/[facility])   - Vascular Disease with Complications  - Unaddressed Dx:Pulmonary Embolism And Infarction (Cms/[facility])      Last lab results:   Hemoglobin A1C (%)   Date Value   02/11/2022 5.8 (H)     Cholesterol (mg/dL)   Date Value   11/18/2021 181     HDL (mg/dL)   Date Value   11/18/2021 52     Triglycerides (mg/dL)   Date Value   11/18/2021 115     LDL (mg/dL)   Date Value   11/18/2021 106     Creatinine, Urine (mg/dL)   Date Value   11/18/2021 70.80     Microalbumin/ Creatinine Ratio (mg/g)   Date Value   11/18/2021 8.1     No results found for: IFOB              Depression Screening:  Recent Review Flowsheet Data     Date 1/11/2022    Adult PHQ 2 Score 0    Adult PHQ 2 Interpretation No further screening needed    Little interest or pleasure  in activity? Not at all    Feeling down, depressed or hopeless? Not at all           Advance Directives:  not discussed     No

## 2023-03-02 NOTE — ED BEHAVIORAL HEALTH ASSESSMENT NOTE - PERSONAL COLLATERAL PHONE
Spoke with pt.  Toe is still quite sore, especially when she hangs it down (not elevated).  No h/o MRSA.  Will change AB to dicloxacillin and put in ID referral.                 [phone]

## 2023-03-02 NOTE — ED BEHAVIORAL HEALTH ASSESSMENT NOTE - DESCRIPTION
Single female, domiciled alone Home Medications:  ARIPiprazole 5 mg oral tablet: 1 tab(s) orally once a day (28 Feb 2023 10:02)  buPROPion 300 mg/24 hours (XL) oral tablet, extended release: 1 tab(s) orally every 24 hours (28 Feb 2023 10:02)  citalopram 40 mg oral tablet: 1 tab(s) orally once a day (28 Feb 2023 10:02)  clonazePAM 0.5 mg oral tablet: 1 tab(s) orally 3 times a day, As Needed (28 Feb 2023 10:02)  ezetimibe 10 mg oral tablet: 1 tab(s) orally once a day (28 Feb 2023 10:02)  furosemide 20 mg oral tablet: 1 tab(s) orally once a day (28 Feb 2023 10:02)  metoprolol succinate 25 mg oral tablet, extended release: 1 tab(s) orally 2 times a day  (28 Feb 2023 10:02)  mirtazapine 30 mg oral tablet: 1 tab(s) orally once a day (at bedtime) (28 Feb 2023 10:02)  thyroid desiccated 120 mg oral tablet: 1 tab(s) orally once a day    **total daily dose 150 mg** (28 Feb 2023 10:02)  thyroid desiccated 30 mg oral tablet: 1 tab(s) orally once a day (28 Feb 2023 10:02)  triamcinolone 0.1% topical cream: Apply topically to affected area as directed (28 Feb 2023 10:01) HTN; Hypo-Thyroid

## 2023-03-02 NOTE — ED BEHAVIORAL HEALTH ASSESSMENT NOTE - SUMMARY
Patient a 76 y/o single female, domiciled alone, hx of Schizophrenia/SAD, under care of Dr. Mazin Drake her Psychiatrist for many years, no prior SI/SA, no drug abuse hx, no legal issues, medically has HTN; Hypo-thyroid. PT also has hx fo delirium and per records "reported hx fo dementia". It si not cleared if dementia dg was ever established. The pt was in  ED on 2/25 - assessed by Dr Zepeda and 2/27 assessed by Dr. Castillo and d/c home with f/u with Dr Drake. Denied being depressed, not S/H and never tried to commit suicide in the past. She denied A/V/H. Reports she is in the process of working with Dr. Drake to arrange for live in help with ADL's etc, as she does admit she could use help tending to such needs at her age. Writer spoke with nephew Toi who has no safety complaints and reports pt has no hx fo SA or SI.  Left a message for  her Psychiatrist Dr. Drake at [phone]    U/A was negative on 25 and today small leukoprotease.   Pt states she is in the process of hiring an aide to help with ADL's etc.    Pt current presentation is likely related to UTI and delirium.   She is on 3 antidepressants and would recommend d/ one of them and I would leave that choice up to her outside provider Dr Drake.     PT does not need inpatient psych admission and she can continue to see Dr Drake in community.

## 2023-03-02 NOTE — ED BEHAVIORAL HEALTH ASSESSMENT NOTE - NSBHATTESTAPPBILLTIME_PSY_A_CORE
I attest my time as CARMEN is greater than 50% of the total combined time spent on qualifying patient care activities. I have reviewed and verified the documentation.

## 2023-03-02 NOTE — ED ADULT NURSE NOTE - OBJECTIVE STATEMENT
76 yo F from home HTN, HLD, depression, dyslexia, diverticulitis, hypothyroid, Schizophrenia/SAD BIBEMS after a well check. patient has no complaints.

## 2023-03-02 NOTE — ED ADULT NURSE REASSESSMENT NOTE - NS ED NURSE REASSESS COMMENT FT1
patient sleeping, arousable to voice. unsteady on feet while attempting to ambulate independently. assisted in walking to bathroom multiple times throughout night. medicated for mild headache pain, currently no complaints while awake. awaiting sw consult in AM. will continue to monitor.

## 2023-03-02 NOTE — ED BEHAVIORAL HEALTH ASSESSMENT NOTE - DETAILS
None Hypothyroid Coordinated with Dr Stokes HTN Discussed with patient and nephew. Psychiatrist did not return call.

## 2023-03-06 RX ORDER — CEPHALEXIN 500 MG
1 CAPSULE ORAL
Qty: 21 | Refills: 0
Start: 2023-03-06

## 2023-03-06 NOTE — ED POST DISCHARGE NOTE - DETAILS
Patient not yet notified. LM for call back. ~Isidoro Sherman PA-C Called home number on file x 2 to discuss UTI results and PCN allergy.  Pt answered phone x 2.  ? could not hear me, but was not answering my questions.  I called back pharmacist and he informed me that he discussed allergy with patient today.  She reported that she got a brown tonuge with PCN in the past.  Will go ahead with Keflex for UTI treatment, and pharmacist will tell pt of plan to treat UTI.  Starla Mariee PA-C

## 2023-03-09 ENCOUNTER — APPOINTMENT (OUTPATIENT)
Dept: FAMILY MEDICINE | Facility: CLINIC | Age: 78
End: 2023-03-09
Payer: MEDICARE

## 2023-03-09 VITALS
DIASTOLIC BLOOD PRESSURE: 84 MMHG | SYSTOLIC BLOOD PRESSURE: 120 MMHG | BODY MASS INDEX: 34.36 KG/M2 | HEART RATE: 63 BPM | TEMPERATURE: 97.9 F | OXYGEN SATURATION: 98 % | WEIGHT: 175 LBS | HEIGHT: 60 IN

## 2023-03-09 DIAGNOSIS — F20.9 SCHIZOPHRENIA, UNSPECIFIED: ICD-10-CM

## 2023-03-09 DIAGNOSIS — N30.00 ACUTE CYSTITIS W/OUT HEMATURIA: ICD-10-CM

## 2023-03-09 DIAGNOSIS — I10 ESSENTIAL (PRIMARY) HYPERTENSION: ICD-10-CM

## 2023-03-09 PROCEDURE — 99214 OFFICE O/P EST MOD 30 MIN: CPT

## 2023-03-09 NOTE — HISTORY OF PRESENT ILLNESS
[FreeTextEntry1] : ER NICOLAS from  [de-identified] : 77-year-old female with history of schizophrenia, hypertension, hypothyroidism, neurogenic bladder presents for follow-up from ER visit with diagnosis of UTI.  She is being treated with a cephalosporin and symptoms are resolving\par \par She now has a home care aide.  Would like to have it covered through her insurance and requires a referral..\par \par She still complains of bilateral lower extremity edema.  She states her cardiologist is aware of this and she was prescribed Lasix for which she has not started.  Her vascular doctor Dr. Gabriel also prescribed compression stockings.  However they are too tight and she is unable to get them on.  She states she has a very expensive handicap bicycle that she needs to start using.\par \par In addition to the aide she has a lifeline bracelet.  She lives alone

## 2023-03-14 ENCOUNTER — APPOINTMENT (OUTPATIENT)
Dept: VASCULAR SURGERY | Facility: CLINIC | Age: 78
End: 2023-03-14
Payer: MEDICARE

## 2023-03-14 ENCOUNTER — NON-APPOINTMENT (OUTPATIENT)
Age: 78
End: 2023-03-14

## 2023-03-14 VITALS
HEIGHT: 60 IN | WEIGHT: 175 LBS | BODY MASS INDEX: 34.36 KG/M2 | HEART RATE: 62 BPM | DIASTOLIC BLOOD PRESSURE: 82 MMHG | SYSTOLIC BLOOD PRESSURE: 143 MMHG

## 2023-03-14 PROCEDURE — 99212 OFFICE O/P EST SF 10 MIN: CPT

## 2023-03-14 RX ORDER — CLONAZEPAM 0.5 MG/1
0.5 TABLET ORAL TWICE DAILY
Refills: 0 | Status: COMPLETED | COMMUNITY
Start: 2021-05-19 | End: 2023-03-14

## 2023-03-14 RX ORDER — FLUTICASONE PROPIONATE 50 UG/1
50 SPRAY, METERED NASAL DAILY
Qty: 1 | Refills: 11 | Status: COMPLETED | COMMUNITY
Start: 2021-05-19 | End: 2023-03-14

## 2023-03-14 NOTE — ASSESSMENT
[employer] grid at home. MVI/AREDS discussed. Patient to call if any changes in vision or grid card. [FreeTextEntry1] : 77-year-old with 4-month history of lower extremity edema and cellulitic appearing lower extremities.  I noted my findings to the patient and recommended doxycycline 100 mg twice daily.  In addition, she will undergo venous duplex imaging.\par \par All questions were answered.

## 2023-03-14 NOTE — HISTORY OF PRESENT ILLNESS
[FreeTextEntry1] : Patient and aid present for routine reevaluation of her lower extremities.  She notes for the last 4 months she has developed significant edema of both lower extremities.  More recently, she notes erythema of the distal lower extremities but denies fever, chills or other signs of infection.  She recently was seen at  for abdominal discomfort and was prescribed antibiotics for a urinary tract infection which she has recently completed.

## 2023-03-14 NOTE — PHYSICAL EXAM
[FreeTextEntry1] : 1+ to 2/4 dorsalis pedis pulses bilaterally; cellulitis of both lower extremities but no lymphangitis; 1-2+ edema both lower extremities; no phlebitic segments, Homans' sign or calf tenderness

## 2023-03-15 ENCOUNTER — APPOINTMENT (OUTPATIENT)
Dept: VASCULAR SURGERY | Facility: CLINIC | Age: 78
End: 2023-03-15
Payer: MEDICARE

## 2023-03-15 PROCEDURE — 93970 EXTREMITY STUDY: CPT

## 2023-03-22 ENCOUNTER — APPOINTMENT (OUTPATIENT)
Dept: VASCULAR SURGERY | Facility: CLINIC | Age: 78
End: 2023-03-22
Payer: MEDICARE

## 2023-03-22 VITALS
DIASTOLIC BLOOD PRESSURE: 78 MMHG | BODY MASS INDEX: 34.36 KG/M2 | WEIGHT: 175 LBS | HEIGHT: 60 IN | SYSTOLIC BLOOD PRESSURE: 125 MMHG | HEART RATE: 80 BPM

## 2023-03-22 PROCEDURE — 99212 OFFICE O/P EST SF 10 MIN: CPT

## 2023-03-22 NOTE — PHYSICAL EXAM
[FreeTextEntry1] : Bilateral 1-2+ edema; no phlebitic segments, Homans' sign, or calf tenderness; stasis dermatitis changes of both legs extending above the knee

## 2023-03-22 NOTE — HISTORY OF PRESENT ILLNESS
[FreeTextEntry1] : Patient presents for evaluation discussion of the results of the venous duplex study performed March 15, 2023.  This study demonstrated no evidence of deep or superficial thrombophlebitis bilaterally and no valvular incompetence bilaterally as well.\par \par The patient was instructed to utilize Lasix but has not been particularly consistent with this as she at multiple issues at home that would prevent her secondary to frequent bathroom visits.\par \par She denies fever or chills.

## 2023-03-22 NOTE — ASSESSMENT
[FreeTextEntry1] : 77-year-old female with bilateral lower extremity edema and no evidence of venous valvular incompetence or DVT on recent exam.  I reviewed the results of the venous duplex study with the patient and suggested that she should adhere to her cardiologist recommendation of utilizing diuretics.\par \par In addition, I suggested utilizing her compression stockings as much as possible.  Also, when not ambulating, I suggested elevation of her legs as much as possible also.\par \par All questions were answered.

## 2023-03-31 ENCOUNTER — EMERGENCY (EMERGENCY)
Facility: HOSPITAL | Age: 78
LOS: 0 days | Discharge: HOME | End: 2023-03-31
Attending: EMERGENCY MEDICINE
Payer: MEDICARE

## 2023-03-31 VITALS
DIASTOLIC BLOOD PRESSURE: 60 MMHG | OXYGEN SATURATION: 98 % | SYSTOLIC BLOOD PRESSURE: 129 MMHG | TEMPERATURE: 98 F | RESPIRATION RATE: 19 BRPM | HEART RATE: 60 BPM

## 2023-03-31 VITALS — HEIGHT: 64 IN
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
## 2023-03-31 DIAGNOSIS — R51.9 HEADACHE, UNSPECIFIED: ICD-10-CM

## 2023-03-31 DIAGNOSIS — Z88.8 ALLERGY STATUS TO OTHER DRUGS, MEDICAMENTS AND BIOLOGICAL SUBSTANCES: ICD-10-CM

## 2023-03-31 DIAGNOSIS — M54.2 CERVICALGIA: ICD-10-CM

## 2023-03-31 DIAGNOSIS — E78.5 HYPERLIPIDEMIA, UNSPECIFIED: ICD-10-CM

## 2023-03-31 DIAGNOSIS — Z79.82 LONG TERM (CURRENT) USE OF ASPIRIN: ICD-10-CM

## 2023-03-31 DIAGNOSIS — M54.9 DORSALGIA, UNSPECIFIED: ICD-10-CM

## 2023-03-31 DIAGNOSIS — Z88.0 ALLERGY STATUS TO PENICILLIN: ICD-10-CM

## 2023-03-31 DIAGNOSIS — Z20.822 CONTACT WITH AND (SUSPECTED) EXPOSURE TO COVID-19: ICD-10-CM

## 2023-03-31 DIAGNOSIS — Z88.5 ALLERGY STATUS TO NARCOTIC AGENT: ICD-10-CM
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
## 2023-03-31 DIAGNOSIS — F32.A DEPRESSION, UNSPECIFIED: ICD-10-CM

## 2023-03-31 DIAGNOSIS — I10 ESSENTIAL (PRIMARY) HYPERTENSION: ICD-10-CM

## 2023-03-31 LAB
ALBUMIN SERPL ELPH-MCNC: 3.5 G/DL — SIGNIFICANT CHANGE UP (ref 3.3–5)
ALP SERPL-CCNC: 65 U/L — SIGNIFICANT CHANGE UP (ref 40–120)
ALT FLD-CCNC: 26 U/L — SIGNIFICANT CHANGE UP (ref 12–78)
ANION GAP SERPL CALC-SCNC: 5 MMOL/L — SIGNIFICANT CHANGE UP (ref 5–17)
APTT BLD: 29 SEC — SIGNIFICANT CHANGE UP (ref 27.5–35.5)
AST SERPL-CCNC: 27 U/L — SIGNIFICANT CHANGE UP (ref 15–37)
BASOPHILS # BLD AUTO: 0.02 K/UL — SIGNIFICANT CHANGE UP (ref 0–0.2)
BASOPHILS NFR BLD AUTO: 0.4 % — SIGNIFICANT CHANGE UP (ref 0–2)
BILIRUB SERPL-MCNC: 0.4 MG/DL — SIGNIFICANT CHANGE UP (ref 0.2–1.2)
BUN SERPL-MCNC: 19 MG/DL — SIGNIFICANT CHANGE UP (ref 7–23)
CALCIUM SERPL-MCNC: 9.4 MG/DL — SIGNIFICANT CHANGE UP (ref 8.5–10.1)
CHLORIDE SERPL-SCNC: 104 MMOL/L — SIGNIFICANT CHANGE UP (ref 96–108)
CO2 SERPL-SCNC: 30 MMOL/L — SIGNIFICANT CHANGE UP (ref 22–31)
CREAT SERPL-MCNC: 0.91 MG/DL — SIGNIFICANT CHANGE UP (ref 0.5–1.3)
EGFR: 65 ML/MIN/1.73M2 — SIGNIFICANT CHANGE UP
EOSINOPHIL # BLD AUTO: 0.17 K/UL — SIGNIFICANT CHANGE UP (ref 0–0.5)
EOSINOPHIL NFR BLD AUTO: 3.2 % — SIGNIFICANT CHANGE UP (ref 0–6)
ETHANOL SERPL-MCNC: <10 MG/DL — SIGNIFICANT CHANGE UP (ref 0–10)
FLUAV AG NPH QL: SIGNIFICANT CHANGE UP
FLUBV AG NPH QL: SIGNIFICANT CHANGE UP
GLUCOSE SERPL-MCNC: 117 MG/DL — HIGH (ref 70–99)
HCT VFR BLD CALC: 39.5 % — SIGNIFICANT CHANGE UP (ref 34.5–45)
HGB BLD-MCNC: 12.8 G/DL — SIGNIFICANT CHANGE UP (ref 11.5–15.5)
IMM GRANULOCYTES NFR BLD AUTO: 0.4 % — SIGNIFICANT CHANGE UP (ref 0–0.9)
INR BLD: 1.02 RATIO — SIGNIFICANT CHANGE UP (ref 0.88–1.16)
LACTATE SERPL-SCNC: 1.1 MMOL/L — SIGNIFICANT CHANGE UP (ref 0.7–2)
LIDOCAIN IGE QN: 88 U/L — SIGNIFICANT CHANGE UP (ref 73–393)
LYMPHOCYTES # BLD AUTO: 1.14 K/UL — SIGNIFICANT CHANGE UP (ref 1–3.3)
LYMPHOCYTES # BLD AUTO: 21.6 % — SIGNIFICANT CHANGE UP (ref 13–44)
MCHC RBC-ENTMCNC: 31.7 PG — SIGNIFICANT CHANGE UP (ref 27–34)
MCHC RBC-ENTMCNC: 32.4 GM/DL — SIGNIFICANT CHANGE UP (ref 32–36)
MCV RBC AUTO: 97.8 FL — SIGNIFICANT CHANGE UP (ref 80–100)
MONOCYTES # BLD AUTO: 0.55 K/UL — SIGNIFICANT CHANGE UP (ref 0–0.9)
MONOCYTES NFR BLD AUTO: 10.4 % — SIGNIFICANT CHANGE UP (ref 2–14)
NEUTROPHILS # BLD AUTO: 3.37 K/UL — SIGNIFICANT CHANGE UP (ref 1.8–7.4)
NEUTROPHILS NFR BLD AUTO: 64 % — SIGNIFICANT CHANGE UP (ref 43–77)
PLATELET # BLD AUTO: 192 K/UL — SIGNIFICANT CHANGE UP (ref 150–400)
POTASSIUM SERPL-MCNC: 4.7 MMOL/L — SIGNIFICANT CHANGE UP (ref 3.5–5.3)
POTASSIUM SERPL-SCNC: 4.7 MMOL/L — SIGNIFICANT CHANGE UP (ref 3.5–5.3)
PROT SERPL-MCNC: 7.3 GM/DL — SIGNIFICANT CHANGE UP (ref 6–8.3)
PROTHROM AB SERPL-ACNC: 11.8 SEC — SIGNIFICANT CHANGE UP (ref 10.5–13.4)
RBC # BLD: 4.04 M/UL — SIGNIFICANT CHANGE UP (ref 3.8–5.2)
RBC # FLD: 13.6 % — SIGNIFICANT CHANGE UP (ref 10.3–14.5)
RSV RNA NPH QL NAA+NON-PROBE: SIGNIFICANT CHANGE UP
SARS-COV-2 RNA SPEC QL NAA+PROBE: SIGNIFICANT CHANGE UP
SODIUM SERPL-SCNC: 139 MMOL/L — SIGNIFICANT CHANGE UP (ref 135–145)
TROPONIN I, HIGH SENSITIVITY RESULT: 5.68 NG/L — SIGNIFICANT CHANGE UP
WBC # BLD: 5.27 K/UL — SIGNIFICANT CHANGE UP (ref 3.8–10.5)
WBC # FLD AUTO: 5.27 K/UL — SIGNIFICANT CHANGE UP (ref 3.8–10.5)

## 2023-03-31 PROCEDURE — 72170 X-RAY EXAM OF PELVIS: CPT | Mod: 26

## 2023-03-31 PROCEDURE — 74177 CT ABD & PELVIS W/CONTRAST: CPT | Mod: MA

## 2023-03-31 PROCEDURE — 70450 CT HEAD/BRAIN W/O DYE: CPT | Mod: 26,MA

## 2023-03-31 PROCEDURE — 85730 THROMBOPLASTIN TIME PARTIAL: CPT

## 2023-03-31 PROCEDURE — 83605 ASSAY OF LACTIC ACID: CPT

## 2023-03-31 PROCEDURE — 72170 X-RAY EXAM OF PELVIS: CPT

## 2023-03-31 PROCEDURE — 71260 CT THORAX DX C+: CPT | Mod: 26,MA

## 2023-03-31 PROCEDURE — 99284 EMERGENCY DEPT VISIT MOD MDM: CPT

## 2023-03-31 PROCEDURE — 85610 PROTHROMBIN TIME: CPT

## 2023-03-31 PROCEDURE — 83690 ASSAY OF LIPASE: CPT

## 2023-03-31 PROCEDURE — 71045 X-RAY EXAM CHEST 1 VIEW: CPT | Mod: 26

## 2023-03-31 PROCEDURE — 96374 THER/PROPH/DIAG INJ IV PUSH: CPT | Mod: XU

## 2023-03-31 PROCEDURE — 99285 EMERGENCY DEPT VISIT HI MDM: CPT | Mod: 25

## 2023-03-31 PROCEDURE — 70450 CT HEAD/BRAIN W/O DYE: CPT | Mod: MA

## 2023-03-31 PROCEDURE — 74177 CT ABD & PELVIS W/CONTRAST: CPT | Mod: 26,MA

## 2023-03-31 PROCEDURE — 80053 COMPREHEN METABOLIC PANEL: CPT

## 2023-03-31 PROCEDURE — 73562 X-RAY EXAM OF KNEE 3: CPT | Mod: 50

## 2023-03-31 PROCEDURE — 71260 CT THORAX DX C+: CPT | Mod: MA

## 2023-03-31 PROCEDURE — 73562 X-RAY EXAM OF KNEE 3: CPT | Mod: 26,50

## 2023-03-31 PROCEDURE — 85025 COMPLETE CBC W/AUTO DIFF WBC: CPT

## 2023-03-31 PROCEDURE — 72125 CT NECK SPINE W/O DYE: CPT | Mod: 26,MA

## 2023-03-31 PROCEDURE — 93005 ELECTROCARDIOGRAM TRACING: CPT

## 2023-03-31 PROCEDURE — 84484 ASSAY OF TROPONIN QUANT: CPT

## 2023-03-31 PROCEDURE — 36415 COLL VENOUS BLD VENIPUNCTURE: CPT

## 2023-03-31 PROCEDURE — 93010 ELECTROCARDIOGRAM REPORT: CPT

## 2023-03-31 PROCEDURE — 0241U: CPT

## 2023-03-31 PROCEDURE — 72125 CT NECK SPINE W/O DYE: CPT | Mod: MA

## 2023-03-31 PROCEDURE — 71045 X-RAY EXAM CHEST 1 VIEW: CPT

## 2023-03-31 PROCEDURE — 80307 DRUG TEST PRSMV CHEM ANLYZR: CPT

## 2023-03-31 RX ORDER — ACETAMINOPHEN 500 MG
1000 TABLET ORAL ONCE
Refills: 0 | Status: COMPLETED | OUTPATIENT
Start: 2023-03-31 | End: 2023-03-31

## 2023-03-31 RX ADMIN — Medication 400 MILLIGRAM(S): at 15:23

## 2023-03-31 NOTE — ED PROVIDER NOTE - CLINICAL SUMMARY MEDICAL DECISION MAKING FREE TEXT BOX
Pt with mechanical fall presenting with pain throughout spine. Will obtain trauma scans, pain control, reassess. Pt with mechanical fall presenting with pain throughout spine. Will obtain trauma scans, pain control, reassess.    03-31-23 @ 17:18 Kevon Hickman Progress Note: symptoms improved, okay for dc, CT's neg.

## 2023-03-31 NOTE — ED ADULT TRIAGE NOTE - CCCP TRG CHIEF CMPLNT
Patient will come to office to : prescription.   Patient was advised of location and hours: Yes.   Patient was advised to bring photo ID: Yes.   Patient elects another party to  item: no.      fall

## 2023-03-31 NOTE — ED PROVIDER NOTE - PATIENT PORTAL LINK FT
You can access the FollowMyHealth Patient Portal offered by [facility] by registering at the following website: [email or web address]. By joining "[employer]"’s FollowMyHealth portal, you will also be able to view your health information using other applications (apps) compatible with our system.

## 2023-03-31 NOTE — ED ADULT NURSE NOTE - NSIMPLEMENTINTERV_GEN_ALL_ED
Implemented All Fall Risk Interventions:  [place] to call system. Call bell, personal items and telephone within reach. Instruct patient to call for assistance. Room bathroom lighting operational. Non-slip footwear when patient is off stretcher. Physically safe environment: no spills, clutter or unnecessary equipment. Stretcher in lowest position, wheels locked, appropriate side rails in place. Provide visual cue, wrist band, yellow gown, etc. Monitor gait and stability. Monitor for mental status changes and reorient to person, place, and time. Review medications for side effects contributing to fall risk. Reinforce activity limits and safety measures with patient and family.

## 2023-03-31 NOTE — ED PROVIDER NOTE - PHYSICAL EXAMINATION
GEN - NAD; well appearing; A+O x3   HEAD - NC/AT     EYES - EOMI, no conjunctival pallor, no scleral icterus  ENT -   mucous membranes  moist , no discharge      NECK - Neck supple  PULM - CTA b/l,  symmetric breath sounds  COR -  RRR, S1 S2, no murmurs  ABD - , ND, NT, soft, no guarding, no rebound, no masses    BACK - tender over entire spine  EXTREMS - no edema, no deformity, warm and well perfused    SKIN - no rash or bruising      NEUROLOGIC - alert, sensation nl, motor 5/5 RUE/LUE/RLE/LLE

## 2023-03-31 NOTE — ED PROVIDER NOTE - OBJECTIVE STATEMENT
76 y/o female with PMHx HTN and HLD presents to ED with aide s/p mechanical fall today, +  Headstrike. Pt reports three falls within the last week. Uses cane for ambulation baseline. Pt c/o back, head, and neck pain, notes head pain is the most severe. On aspirin.

## 2023-03-31 NOTE — ED PROVIDER NOTE - NS ED ROS FT
Gen: No fever, normal appetite  Eyes: No eye irritation or discharge  ENT: No ear pain, congestion, sore throat  Resp: No cough or trouble breathing  Cardiovascular: No chest pain or palpitation  Gastroenteric: No nausea/vomiting, diarrhea, constipation  :  No change in urine output; no dysuria  MS: + Head and back pain  Skin: No rashes  Neuro: No headache; no abnormal movements  Remainder negative, except as per the HPI

## 2023-03-31 NOTE — ED PROVIDER NOTE - NSFOLLOWUPINSTRUCTIONS_ED_ALL_ED_FT
Fall Prevention in the Home, Adult      Falls can cause injuries and affect people of all ages. There are many simple things that you can do to make your home safe and to help prevent falls. Ask for help when making these changes, if needed.      What actions can I take to prevent falls?    General instructions     •Use good lighting in all rooms. Replace any light bulbs that burn out, turn on lights if it is dark, and use night-lights.      •Place frequently used items in easy-to-reach places. Lower the shelves around your home if necessary.      •Set up furniture so that there are clear paths around it. Avoid moving your furniture around.      •Remove throw rugs and other tripping hazards from the floor.      •Avoid walking on wet floors.      •Fix any uneven floor surfaces.      •Add color or contrast paint or tape to grab bars and handrails in your home. Place contrasting color strips on the first and last steps of staircases.      •When you use a stepladder, make sure that it is completely opened and that the sides and supports are firmly locked. Have someone hold the ladder while you are using it. Do not climb a closed stepladder.      •Know where your pets are when moving through your home.        What can I do in the bathroom?                   •Keep the floor dry. Immediately clean up any water that is on the floor.      •Remove soap buildup in the tub or shower regularly.      •Use nonskid mats or decals on the floor of the tub or shower.      •Attach bath mats securely with double-sided, nonslip rug tape.      •If you need to sit down while you are in the shower, use a plastic, nonslip stool.      •Install grab bars by the toilet and in the tub and shower. Do not use towel bars as grab bars.      What can I do in the bedroom?     •Make sure that a bedside light is easy to reach.      • Do not use oversized bedding that reaches the floor.      •Have a firm chair that has side arms to use for getting dressed.      What can I do in the kitchen?     •Clean up any spills right away.      •If you need to reach for something above you, use a sturdy step stool that has a grab bar.      •Keep electrical cables out of the way.      • Do not use floor polish or wax that makes floors slippery. If you must use wax, make sure that it is non-skid floor wax.      What can I do with my stairs?     • Do not leave any items on the stairs.      •Make sure that you have a light switch at the top and the bottom of the stairs. Have them installed if you do not have them.      •Make sure that there are handrails on both sides of the stairs. Fix handrails that are broken or loose. Make sure that handrails are as long as the staircases.      •Install non-slip stair treads on all stairs in your home.      •Avoid having throw rugs at the top or bottom of stairs, or secure the rugs with carpet tape to prevent them from moving.      •Choose a carpet design that does not hide the edge of steps on the stairs.      •Check any carpeting to make sure that it is firmly attached to the stairs. Fix any carpet that is loose or worn.      What can I do on the outside of my home?     •Use bright outdoor lighting.      •Regularly repair the edges of walkways and driveways and fix any cracks.      •Remove high doorway thresholds.      •Trim any shrubbery on the main path into your home.      •Regularly check that handrails are securely fastened and in good repair. Both sides of all steps should have handrails.      •Install guardrails along the edges of any raised decks or porches.      •Clear walkways of debris and clutter, including tools and rocks.      •Have leaves, snow, and ice cleared regularly.      •Use sand or salt on walkways during winter months.      •In the garage, clean up any spills right away, including grease or oil spills.      What other actions can I take?     •Wear closed-toe shoes that fit well and support your feet. Wear shoes that have rubber soles or low heels.      •Use mobility aids as needed, such as canes, walkers, scooters, and crutches.      •Review your medicines with your health care provider. Some medicines can cause dizziness or changes in blood pressure, which increase your risk of falling.      Talk with your health care provider about other ways that you can decrease your risk of falls. This may include working with a physical therapist or  to improve your strength, balance, and endurance.      Where to find more information    •Centers for Disease Control and Prevention, [place]: www.cdc.gov      •[place] on Aging: www.fina.nih.gov        Contact a health care provider if:    •You are afraid of falling at home.      •You feel weak, drowsy, or dizzy at home.      •You fall at home.        Summary    •There are many simple things that you can do to make your home safe and to help prevent falls.      •Ways to make your home safe include removing tripping hazards and installing grab bars in the bathroom.      •Ask for help when making these changes in your home.      This information is not intended to replace advice given to you by your health care provider. Make sure you discuss any questions you have with your health care provider.      Document Revised: 09/19/2022 Document Reviewed: 07/21/2021    Elsevier Patient Education © 2022 Elsevier Inc.

## 2023-03-31 NOTE — ED ADULT TRIAGE NOTE - CHIEF COMPLAINT QUOTE
Pt c/o mechanical fall on ceramic tile this afternoon. +head strike. On 81mg ASA. Endorses head pain, back pain, and b/l arm pain. Denies LOC. Trauma alert called 1453. Dr. Franklin aware.

## 2023-04-05 ENCOUNTER — NON-APPOINTMENT (OUTPATIENT)
Age: 78
End: 2023-04-05

## 2023-04-11 ENCOUNTER — APPOINTMENT (OUTPATIENT)
Dept: FAMILY MEDICINE | Facility: CLINIC | Age: 78
End: 2023-04-11
Payer: MEDICARE

## 2023-04-11 VITALS
OXYGEN SATURATION: 97 % | HEIGHT: 60 IN | DIASTOLIC BLOOD PRESSURE: 70 MMHG | SYSTOLIC BLOOD PRESSURE: 128 MMHG | HEART RATE: 76 BPM | WEIGHT: 175 LBS | BODY MASS INDEX: 34.36 KG/M2 | TEMPERATURE: 97.8 F

## 2023-04-11 DIAGNOSIS — R26.81 UNSTEADINESS ON FEET: ICD-10-CM

## 2023-04-11 PROCEDURE — 99213 OFFICE O/P EST LOW 20 MIN: CPT

## 2023-04-11 NOTE — HISTORY OF PRESENT ILLNESS
[Admitted on: ___] : The patient was admitted on [unfilled] [Discharged on ___] : discharged on [unfilled] [Discharge Summary] : discharge summary [Discharge Med List] : discharge medication list [Med Reconciliation] : medication reconciliation has been completed [FreeTextEntry2] : 77-year-old female with history of schizophrenia, hypertension, hypothyroidism, neurogenic bladder presents for follow-up from ER visit for mechanical fall due to dehydration/weakness from diarrhea.  Her aides states she took too much laxative for constipation, causing diarrhea and weakness.  She had 3 falls that week.  She hit her head, which prompted her to be evaluated at ER.  ER records reviewed.  CT head/neck without acute findings. \par She reports no headaches, dizziness, neck pain.\par She states she ambulates with cane and walker.  She has balance trouble and leg weakness.  She states she had PT many years ago. She has an aide who can now drive her to PT

## 2023-04-20 ENCOUNTER — FORM ENCOUNTER (OUTPATIENT)
Age: 78
End: 2023-04-20

## 2023-05-05 ENCOUNTER — APPOINTMENT (OUTPATIENT)
Dept: VASCULAR SURGERY | Facility: CLINIC | Age: 78
End: 2023-05-05
Payer: MEDICARE

## 2023-05-05 VITALS
WEIGHT: 175 LBS | HEART RATE: 66 BPM | SYSTOLIC BLOOD PRESSURE: 134 MMHG | DIASTOLIC BLOOD PRESSURE: 81 MMHG | HEIGHT: 71 IN | BODY MASS INDEX: 24.5 KG/M2

## 2023-05-05 DIAGNOSIS — R60.9 EDEMA, UNSPECIFIED: ICD-10-CM

## 2023-05-05 PROCEDURE — 99212 OFFICE O/P EST SF 10 MIN: CPT

## 2023-05-05 NOTE — HISTORY OF PRESENT ILLNESS
[FreeTextEntry1] : Patient presents for routine reevaluation.  She is not utilizing compression stockings.  She is able to ambulate albeit with some challenges utilizing a cane.

## 2023-05-05 NOTE — ASSESSMENT
[FreeTextEntry1] : 28-year-old with lower extremity stasis changes and trace to 1+ edema probably related to her consistent legs in a dependent position.  However, she has no limb threatening symptoms or signs and I recommended ambulation is much as possible and then elevation when not ambulating.\par \par The patient will follow-up with me in 6 months time for routine reevaluation or sooner should circumstances require.

## 2023-05-05 NOTE — PHYSICAL EXAM
[FreeTextEntry1] : Mild stasis changes of both lower extremities; no evidence of ulcers or gangrenous changes

## 2023-05-08 ENCOUNTER — FORM ENCOUNTER (OUTPATIENT)
Age: 78
End: 2023-05-08

## 2023-06-19 ENCOUNTER — FORM ENCOUNTER (OUTPATIENT)
Age: 78
End: 2023-06-19

## 2023-06-20 ENCOUNTER — OFFICE (OUTPATIENT)
Dept: URBAN - METROPOLITAN AREA CLINIC 102 | Facility: CLINIC | Age: 78
Setting detail: OPHTHALMOLOGY
End: 2023-06-20
Payer: MEDICARE

## 2023-06-20 DIAGNOSIS — H40.033: ICD-10-CM

## 2023-06-20 DIAGNOSIS — D31.30: ICD-10-CM

## 2023-06-20 DIAGNOSIS — H25.13: ICD-10-CM

## 2023-06-20 DIAGNOSIS — H35.363: ICD-10-CM

## 2023-06-20 PROCEDURE — 92083 EXTENDED VISUAL FIELD XM: CPT | Performed by: OPHTHALMOLOGY

## 2023-06-20 PROCEDURE — 92020 GONIOSCOPY: CPT | Performed by: OPHTHALMOLOGY

## 2023-06-20 PROCEDURE — 92014 COMPRE OPH EXAM EST PT 1/>: CPT | Performed by: OPHTHALMOLOGY

## 2023-06-20 PROCEDURE — 92133 CPTRZD OPH DX IMG PST SGM ON: CPT | Performed by: OPHTHALMOLOGY

## 2023-06-20 ASSESSMENT — REFRACTION_MANIFEST
OS_VA1: 20/25
OD_CYLINDER: -0.75
OD_AXIS: 80
OS_CYLINDER: -1.25
OD_ADD: +2.50
OD_CYLINDER: -0.75
OS_CYLINDER: -1.25
OD_SPHERE: +1.50
OS_AXIS: 100
OS_SPHERE: +1.50
OS_ADD: +2.75
OS_SPHERE: +1.50
OS_ADD: +2.50
OD_VA1: 20/25
OS_VA1: 20/25
OD_AXIS: 80
OD_SPHERE: +1.50
OD_VA1: 20/25
OS_AXIS: 100
OD_ADD: +2.75

## 2023-06-20 ASSESSMENT — SPHEQUIV_DERIVED
OD_SPHEQUIV: 1.125
OD_SPHEQUIV: 1.125
OS_SPHEQUIV: 0.875
OS_SPHEQUIV: 0.875
OD_SPHEQUIV: 1.25
OS_SPHEQUIV: 1.875

## 2023-06-20 ASSESSMENT — REFRACTION_CURRENTRX
OD_SPHERE: +1.50
OD_OVR_VA: 20/
OS_OVR_VA: 20/
OD_CYLINDER: -0.75
OD_ADD: +2.50
OS_VPRISM_DIRECTION: PROGS
OS_SPHERE: +1.75
OD_VPRISM_DIRECTION: PROGS
OS_CYLINDER: -1.25
OS_ADD: +2.50
OD_AXIS: 80
OS_AXIS: 100

## 2023-06-20 ASSESSMENT — KERATOMETRY
OS_K1POWER_DIOPTERS: 43.75
OD_K1POWER_DIOPTERS: 44.00
OD_AXISANGLE_DEGREES: 008
OS_AXISANGLE_DEGREES: 175
OD_K2POWER_DIOPTERS: 44.50
OS_K2POWER_DIOPTERS: 44.75
METHOD_AUTO_MANUAL: AUTO

## 2023-06-20 ASSESSMENT — REFRACTION_AUTOREFRACTION
OS_SPHERE: +2.75
OD_AXIS: 097
OD_SPHERE: +1.75
OD_CYLINDER: -1.00
OS_CYLINDER: -1.75
OS_AXIS: 095

## 2023-06-20 ASSESSMENT — CONFRONTATIONAL VISUAL FIELD TEST (CVF)
OS_FINDINGS: FULL
OD_FINDINGS: FULL

## 2023-06-20 ASSESSMENT — VISUAL ACUITY
OD_BCVA: 20/25-2
OS_BCVA: 20/30

## 2023-06-20 ASSESSMENT — TONOMETRY
OS_IOP_MMHG: 17
OD_IOP_MMHG: 17

## 2023-06-20 ASSESSMENT — AXIALLENGTH_DERIVED
OD_AL: 22.8533
OS_AL: 22.6271
OS_AL: 22.9912
OS_AL: 22.9912
OD_AL: 22.8991
OD_AL: 22.8991

## 2023-08-08 ENCOUNTER — APPOINTMENT (OUTPATIENT)
Dept: FAMILY MEDICINE | Facility: CLINIC | Age: 78
End: 2023-08-08
Payer: MEDICARE

## 2023-08-08 ENCOUNTER — NON-APPOINTMENT (OUTPATIENT)
Age: 78
End: 2023-08-08

## 2023-08-08 VITALS
SYSTOLIC BLOOD PRESSURE: 124 MMHG | OXYGEN SATURATION: 98 % | DIASTOLIC BLOOD PRESSURE: 76 MMHG | TEMPERATURE: 97.4 F | HEART RATE: 69 BPM | WEIGHT: 175 LBS | BODY MASS INDEX: 33.04 KG/M2 | HEIGHT: 61 IN

## 2023-08-08 DIAGNOSIS — R73.09 OTHER ABNORMAL GLUCOSE: ICD-10-CM

## 2023-08-08 DIAGNOSIS — E03.9 HYPOTHYROIDISM, UNSPECIFIED: ICD-10-CM

## 2023-08-08 DIAGNOSIS — Z91.81 HISTORY OF FALLING: ICD-10-CM

## 2023-08-08 DIAGNOSIS — R60.0 LOCALIZED EDEMA: ICD-10-CM

## 2023-08-08 PROCEDURE — 99214 OFFICE O/P EST MOD 30 MIN: CPT | Mod: 25

## 2023-08-08 PROCEDURE — 36415 COLL VENOUS BLD VENIPUNCTURE: CPT

## 2023-08-08 RX ORDER — DOXYCYCLINE HYCLATE 100 MG/1
100 CAPSULE ORAL
Qty: 14 | Refills: 0 | Status: DISCONTINUED | COMMUNITY
Start: 2023-03-14 | End: 2023-08-08

## 2023-08-08 NOTE — HISTORY OF PRESENT ILLNESS
[FreeTextEntry1] : Pt is here for a follow up.  [de-identified] : 78-year-old woman with history of schizophrenia, hypothyroidism, HTN, chronic venous stasis of lower extremities.  Presents for follow-up.  Last seen 4 months ago for a trip and fall.  She states she successfully completed 14 sessions of physical therapy.  She is very happy with the outcome.  She feels improved balance and strength. Her home health aide continues to come twice a week, as this is what she can afford. She wears a life alert bracelet. Her family lives in [place]  Today she complains that her legs are swollen and she is concerned about a scab on the leg.  She does not always elevate her legs.  She also does not wear compression stockings as she states they are too tight and hard to get on.  She has been following with Dr. Cuellar every 6 months.  She takes Lasix as needed.  She states for the last 4 nights she has had trouble sleeping due to increased stress and worry over finances.  Her psychiatrist prescribes trazodone and Klonopin to help with sleep, which she has been taking.  She has an upcoming appointment in 2 weeks with her psychiatrist to discuss this.  She would like to resume swimming/physical exercise at the pool and the [facility].

## 2023-08-08 NOTE — HEALTH RISK ASSESSMENT
[0] : 2) Feeling down, depressed, or hopeless: Not at all (0) [PHQ-2 Negative - No further assessment needed] : PHQ-2 Negative - No further assessment needed [Never] : Never [[id number]] : 0

## 2023-08-08 NOTE — PHYSICAL EXAM
[Normal] : normal rate, regular rhythm, normal S1 and S2 and no murmur heard [de-identified] : Bilateral lower extremity pitting edema +1.  No signs of cellulitis or weeping

## 2023-08-10 LAB
ALBUMIN SERPL ELPH-MCNC: 4.4 G/DL
ALP BLD-CCNC: 66 U/L
ALT SERPL-CCNC: 17 U/L
ANION GAP SERPL CALC-SCNC: 10 MMOL/L
AST SERPL-CCNC: 23 U/L
BILIRUB SERPL-MCNC: 0.2 MG/DL
BUN SERPL-MCNC: 29 MG/DL
CALCIUM SERPL-MCNC: 9.8 MG/DL
CHLORIDE SERPL-SCNC: 97 MMOL/L
CO2 SERPL-SCNC: 29 MMOL/L
CREAT SERPL-MCNC: 0.91 MG/DL
EGFR: 65 ML/MIN/1.73M2
ESTIMATED AVERAGE GLUCOSE: 114 MG/DL
GLUCOSE SERPL-MCNC: 92 MG/DL
HBA1C MFR BLD HPLC: 5.6 %
POTASSIUM SERPL-SCNC: 4.2 MMOL/L
PROT SERPL-MCNC: 7 G/DL
SODIUM SERPL-SCNC: 136 MMOL/L
TSH SERPL-ACNC: 1.14 UIU/ML

## 2023-08-16 NOTE — ED STATDOCS - SCRIBE NAME
DISCHARGE SUMMARY and INSTRUCTIONS:    You are being discharged undelivered because you and your baby are in stable condition.    STATUS NOTE:  Date:  8/15/2023  Time: 2120  Destination: Home    ACTIVITY:  As tolerated    DIET:  Continue to eat a healthy pregnancy diet  Be sure to drink 8-10 glasses of water a day  Don't go more than 8-10 hours without eating or drinking    CONTACT YOUR DOCTOR OR MIDWIFE IF YOU HAVE ANY OF THESE WARNING SIGNS OF PREGNANCY:  Sudden and/or constant pain  Vaginal bleeding  Sudden gush or leaking fluid from the vagina  Fainting  Headache that will not go away with your normal comfort measures  Any changes in your vision, such as blurry vision, double vision or spots/stars before your eyes  Severe nausea and vomiting  Little or no urine, pain and burning with urination, or blood in your urine  Chills or fever  Increase or change in vaginal discharge  Your baby moves less than usual (See Fetal Movement Counting below.)      FETAL MOVEMENT COUNTING:  One way you can know your baby is doing well during pregnancy is to pay attention to his or her movements.  We recommend counting your baby's movements once each day beginning in the third trimester, or about the 26th week of pregnancy.      How to count baby's movements:  Choose a time that is convenient for you when the baby is active, such as after a meal.  Try to do it at the same time each day.  Sit comfortably or lie on your side.  Note the time on the clock when you're ready to begin counting.  Count each movement until the baby has moved 10 times.   Count all movements that are either seen or felt, including shifting, rolling, or kicking.  Do not count baby's hiccoughs.  If you have counted 10 movements in less than 2 hours, resume your normal activities and count again tomorrow.    If it takes longer than 1 hour to count 4 movements, try these suggestions:  Eat something if you haven't eaten within 2 to 3 hours before you count.  Try  to lie down in a quiet, undisturbed location, on either your right or left side.  Place your hands on your belly and focus on your baby's movements.  Continue your count from where you left off before, until you feel 10 movements.  If it took longer than 2 hours to count 10 movements, call your midwife or doctor right away for recommendations.    Remember:  By counting and staying aware of your baby's movements, you will be helping your caregivers provide the best possible care for you and your baby.      A copy of this form was provided to and reviewed with Gary Huynh by  RN, 8/15/2023.  Patient verbalized understanding and has no further questions at this time.     Vonda Lucio

## 2023-09-06 ENCOUNTER — APPOINTMENT (OUTPATIENT)
Dept: VASCULAR SURGERY | Facility: CLINIC | Age: 78
End: 2023-09-06

## 2023-11-08 ENCOUNTER — APPOINTMENT (OUTPATIENT)
Dept: VASCULAR SURGERY | Facility: CLINIC | Age: 78
End: 2023-11-08

## 2023-11-30 ENCOUNTER — OFFICE (OUTPATIENT)
Dept: URBAN - METROPOLITAN AREA CLINIC 102 | Facility: CLINIC | Age: 78
Setting detail: OPHTHALMOLOGY
End: 2023-11-30
Payer: MEDICARE

## 2023-11-30 DIAGNOSIS — D31.30: ICD-10-CM

## 2023-11-30 DIAGNOSIS — H40.033: ICD-10-CM

## 2023-11-30 DIAGNOSIS — H35.363: ICD-10-CM

## 2023-11-30 DIAGNOSIS — H25.13: ICD-10-CM

## 2023-11-30 PROCEDURE — 92014 COMPRE OPH EXAM EST PT 1/>: CPT | Performed by: OPHTHALMOLOGY

## 2023-11-30 PROCEDURE — 92020 GONIOSCOPY: CPT | Performed by: OPHTHALMOLOGY

## 2023-11-30 ASSESSMENT — REFRACTION_CURRENTRX
OD_ADD: +2.50
OS_AXIS: 100
OS_OVR_VA: 20/
OD_CYLINDER: -0.75
OS_ADD: +2.50
OD_VPRISM_DIRECTION: PROGS
OD_OVR_VA: 20/
OS_SPHERE: +1.75
OS_VPRISM_DIRECTION: PROGS
OD_SPHERE: +1.50
OS_CYLINDER: -1.25
OD_AXIS: 80

## 2023-11-30 ASSESSMENT — REFRACTION_MANIFEST
OS_SPHERE: +1.50
OS_CYLINDER: -1.25
OD_VA1: 20/25
OD_SPHERE: +1.50
OD_AXIS: 80
OD_VA1: 20/25
OS_ADD: +2.50
OD_ADD: +2.75
OS_AXIS: 100
OS_AXIS: 100
OD_AXIS: 80
OS_ADD: +2.75
OS_VA1: 20/25
OD_CYLINDER: -0.75
OS_CYLINDER: -1.25
OS_VA1: 20/25
OD_CYLINDER: -0.75
OD_ADD: +2.50
OD_SPHERE: +1.50
OS_SPHERE: +1.50

## 2023-11-30 ASSESSMENT — REFRACTION_AUTOREFRACTION
OS_CYLINDER: -1.25
OD_SPHERE: +2.00
OS_SPHERE: +2.25
OD_CYLINDER: -0.75
OD_AXIS: 084
OS_AXIS: 095

## 2023-11-30 ASSESSMENT — SPHEQUIV_DERIVED
OS_SPHEQUIV: 1.625
OD_SPHEQUIV: 1.625
OD_SPHEQUIV: 1.125
OS_SPHEQUIV: 0.875
OD_SPHEQUIV: 1.125
OS_SPHEQUIV: 0.875

## 2023-11-30 ASSESSMENT — CONFRONTATIONAL VISUAL FIELD TEST (CVF)
OD_FINDINGS: FULL
OS_FINDINGS: FULL

## 2023-12-29 ENCOUNTER — APPOINTMENT (OUTPATIENT)
Dept: INTERNAL MEDICINE | Facility: CLINIC | Age: 78
End: 2023-12-29
Payer: MEDICARE

## 2023-12-29 VITALS
OXYGEN SATURATION: 98 % | WEIGHT: 200 LBS | HEIGHT: 61 IN | HEART RATE: 73 BPM | TEMPERATURE: 97.3 F | SYSTOLIC BLOOD PRESSURE: 122 MMHG | DIASTOLIC BLOOD PRESSURE: 70 MMHG | BODY MASS INDEX: 37.76 KG/M2

## 2023-12-29 DIAGNOSIS — L03.032 CELLULITIS OF LEFT TOE: ICD-10-CM

## 2023-12-29 PROCEDURE — 99213 OFFICE O/P EST LOW 20 MIN: CPT

## 2023-12-29 NOTE — HISTORY OF PRESENT ILLNESS
[FreeTextEntry1] : reports blue toe nail [de-identified] : 78-year-old woman with history of schizophrenia, hypothyroidism, HTN, chronic venous stasis of lower extremities presents for acute visit.  Reports the left big toenail has been painful for 1 month, pain 8/10 when worse, sharp in nature. Reports pain worse with wearing shoes. She reports now there is yellowish discharge from the area. She denies fever chill, n/v, abdominal pain, sob, cp.  She reports not getting an appointment with her podiatrist till ruy/10/24 so felt needed evaluation, refuses to go to ER

## 2023-12-29 NOTE — REVIEW OF SYSTEMS
[Fever] : no fever [Chills] : no chills [Night Sweats] : no night sweats [Vision Problems] : no vision problems [Nasal Discharge] : no nasal discharge [Chest Pain] : no chest pain [Palpitations] : no palpitations [Leg Claudication] : no leg claudication [Shortness Of Breath] : no shortness of breath [Wheezing] : no wheezing [Cough] : no cough [Abdominal Pain] : no abdominal pain [Vomiting] : no vomiting [Dysuria] : no dysuria [Hematuria] : no hematuria [Joint Pain] : no joint pain [Muscle Pain] : no muscle pain [Itching] : no itching [Skin Rash] : no skin rash [Headache] : no headache [Dizziness] : no dizziness [Suicidal] : not suicidal [FreeTextEntry1] : Left big toe pain and discharge

## 2024-01-19 PROBLEM — H11.151 PINGUECULA ; RIGHT EYE: Status: ACTIVE | Noted: 2024-01-19

## 2024-05-08 ENCOUNTER — APPOINTMENT (OUTPATIENT)
Dept: ORTHOPEDIC SURGERY | Facility: CLINIC | Age: 79
End: 2024-05-08
Payer: MEDICARE

## 2024-05-08 VITALS
HEART RATE: 67 BPM | BODY MASS INDEX: 37.3 KG/M2 | SYSTOLIC BLOOD PRESSURE: 127 MMHG | DIASTOLIC BLOOD PRESSURE: 79 MMHG | HEIGHT: 60 IN | WEIGHT: 190 LBS

## 2024-05-08 PROCEDURE — 73560 X-RAY EXAM OF KNEE 1 OR 2: CPT | Mod: 50

## 2024-05-08 PROCEDURE — 99213 OFFICE O/P EST LOW 20 MIN: CPT

## 2024-05-08 RX ORDER — HYALURONATE SODIUM 10 MG/ML
25 SYRINGE (ML) INTRAARTICULAR
Qty: 10 | Refills: 0 | Status: ACTIVE | OUTPATIENT
Start: 2024-05-08

## 2024-05-14 RX ORDER — THYROID, PORCINE 30 MG/1
30 TABLET ORAL
Qty: 90 | Refills: 0 | Status: ACTIVE | COMMUNITY
Start: 2021-05-19 | End: 1900-01-01

## 2024-05-20 ENCOUNTER — APPOINTMENT (OUTPATIENT)
Dept: ORTHOPEDIC SURGERY | Facility: CLINIC | Age: 79
End: 2024-05-20
Payer: MEDICARE

## 2024-05-20 PROCEDURE — 20610 DRAIN/INJ JOINT/BURSA W/O US: CPT | Mod: 50

## 2024-05-20 NOTE — PROCEDURE
[de-identified] :  Indications: Osteoarthritis of right knee  Osteoarthritis of left knee   Consent: The risks and benefits of the procedure were discussed with the patient in detail.  Upon verbal consent of the patient, we proceeded with the GenVisc-850 injections as noted below.    Description of Procedure: After a sterile prep, the patient underwent a GenVisc-850 injection of 25 mg of Sodium Hyaluronate in a 2.5 mL syringe into the right and left knee.  The patient tolerated the procedures well.  There were no complications.    :  [employer] NDC# [address]-43 Lot#:   [id number]  Expiration Date:  09/30/2026  Plan: I have recommended ice and elevation.  The patient will be reassessed in one week for the next GenVisc-850 injection for osteoarthritis of the right and left knee.

## 2024-05-20 NOTE — PHYSICAL EXAM
[de-identified] : Right Knee: Knee: Range of Motion in Degrees      Claimant: Normal:  Flexion Active  120    135-degrees  Flexion Passive  120   135-degrees  Extension Active  0   0-5-degrees  Extension Passive  0   0-5-degrees   No weakness to flexion/extension. No evidence of instability in the AP plane or varus or valgus stress. Negative Lachman. Negative pivot shift. Negative anterior drawer test. Negative posterior drawer test. Negative Sarita. Negative Apley grind. No medial or lateral joint line tenderness. Positive tenderness over the medial and lateral facet of the patella. Positive patellofemoral crepitations. No lateral tilting patella. No patella apprehension. Positive crepitation in the medial and lateral femoral condyle. No proximal or distal swelling, edema or tenderness. No gross motor or sensory deficits. Mild intra-articular swelling. 2+ DP and PT pulses. No varus or valgus malalignment. Skin is intact. No rashes, scars or lesions.  Left Knee: Knee: Range of Motion in Degrees      Claimant: Normal:  Flexion Active  120    135-degrees  Flexion Passive  120   135-degrees  Extension Active  0   0-5-degrees  Extension Passive  0   0-5-degrees   No weakness to flexion/extension. No evidence of instability in the AP plane or varus or valgus stress. Negative Lachman. Negative pivot shift. Negative anterior drawer test. Negative posterior drawer test. Negative Sarita. Negative Apley grind. No medial or lateral joint line tenderness. Positive tenderness over the medial and lateral facet of the patella. Positive patellofemoral crepitations. No lateral tilting patella. No patella apprehension. Positive crepitation in the medial and lateral femoral condyle. No proximal or distal swelling, edema or tenderness. No gross motor or sensory deficits. Mild intra-articular swelling. 2+ DP and PT pulses. No varus or valgus malalignment. Skin is intact. No rashes, scars or lesions.

## 2024-05-20 NOTE — ADDENDUM
[FreeTextEntry1] : This note was written by Jair Sierra on 05/20/2024, acting as a scribe for Damian Cabezas III, MD

## 2024-05-29 ENCOUNTER — APPOINTMENT (OUTPATIENT)
Dept: ORTHOPEDIC SURGERY | Facility: CLINIC | Age: 79
End: 2024-05-29
Payer: MEDICARE

## 2024-05-29 PROCEDURE — 20610 DRAIN/INJ JOINT/BURSA W/O US: CPT | Mod: 50

## 2024-05-29 NOTE — PHYSICAL EXAM
[de-identified] : Right Knee: Knee: Range of Motion in Degrees	 	                  Claimant:	Normal:	 Flexion Active	  120 	                135-degrees	 Flexion Passive	  120	                135-degrees	 Extension Active	  0	                0-5-degrees	 Extension Passive	  0	                0-5-degrees	  No weakness to flexion/extension.  No evidence of instability in the AP plane or varus or valgus stress.  Negative  Lachman.  Negative pivot shift.  Negative anterior drawer test.  Negative posterior drawer test.  Negative Sarita.  Negative Apley grind.  No medial or lateral joint line tenderness.  Positive tenderness over the medial and lateral facet of the patella.  Positive patellofemoral crepitations.  No lateral tilting patella.  No patella apprehension.  Positive crepitation in the medial and lateral femoral condyle.  No proximal or distal swelling, edema or tenderness.  No gross motor or sensory deficits.  Mild intra-articular swelling.  2+ DP and PT pulses.  No varus or valgus malalignment.  Skin is intact.  No rashes, scars or lesions.    Left Knee: Knee: Range of Motion in Degrees	 	                  Claimant:	Normal:	 Flexion Active	  120 	                135-degrees	 Flexion Passive	  120	                135-degrees	 Extension Active	  0	                0-5-degrees	 Extension Passive	  0	                0-5-degrees	  No weakness to flexion/extension.  No evidence of instability in the AP plane or varus or valgus stress.  Negative  Lachman.  Negative pivot shift.  Negative anterior drawer test.  Negative posterior drawer test.  Negative Sarita.  Negative Apley grind.  No medial or lateral joint line tenderness.  Positive tenderness over the medial and lateral facet of the patella.  Positive patellofemoral crepitations.  No lateral tilting patella.  No patella apprehension.  Positive crepitation in the medial and lateral femoral condyle.  No proximal or distal swelling, edema or tenderness.  No gross motor or sensory deficits.  Mild intra-articular swelling.  2+ DP and PT pulses.  No varus or valgus malalignment.  Skin is intact.  No rashes, scars or lesions.   [de-identified] : Gait and Station:  Ambulating with a slightly antalgic to antalgic gait.  Station:  Normal.  [de-identified] : Appearance:  Well-developed, well-nourished female in no acute distress.   [de-identified] : Radiographs, one to two views of the bilateral knees with AP standing taken in the office today, show severe arthritis, worse at the patellofemoral joints.

## 2024-05-29 NOTE — DISCUSSION/SUMMARY
[de-identified] : At this time, due to osteoarthritis of the bilateral knees, I recommend a course of viscosupplementation, to be scheduled at this time.

## 2024-05-29 NOTE — ADDENDUM
[FreeTextEntry1] : This note was written by Georgiana Galdamez on 05/09/2024 acting as scribe for Damian Cabezas III, MD

## 2024-05-29 NOTE — HISTORY OF PRESENT ILLNESS
[de-identified] : The patient comes in today with complaints of pain to her bilateral knees.  She has not been seen in a while.

## 2024-05-31 PROBLEM — H35.363 DRUSEN; BOTH EYES: Status: ACTIVE | Noted: 2024-05-31

## 2024-06-03 NOTE — PROCEDURE
[de-identified] :  Indications: Osteoarthritis of right knee  Osteoarthritis of left knee   Consent: The risks and benefits of the procedure were discussed with the patient in detail.  Upon verbal consent of the patient, we proceeded with the GenVisc-850 injections as noted below.    Description of Procedure: After a sterile prep, the patient underwent a GenVisc-850 injection of 25 mg of Sodium Hyaluronate in a 2.5 mL syringe into the right and left knee.  The patient tolerated the procedures well.  There were no complications.    :  [employer] NDC# [address]-25 Lot#:   [id number]   Expiration Date:  09/30/2026  Plan: I have recommended ice and elevation.  The patient will be reassessed in one week for the next GenVisc-850 injection for osteoarthritis of the right and left knee.

## 2024-06-03 NOTE — PHYSICAL EXAM
[de-identified] : Right Knee: Knee: Range of Motion in Degrees Claimant: Normal: Flexion Active 120 135-degrees Flexion Passive 120 135-degrees Extension Active 0 0-5-degrees Extension Passive 0 0-5-degrees  No weakness to flexion/extension. No evidence of instability in the AP plane or varus or valgus stress. Negative Lachman. Negative pivot shift. Negative anterior drawer test. Negative posterior drawer test. Negative Sarita. Negative Apley grind. No medial or lateral joint line tenderness. Positive tenderness over the medial and lateral facet of the patella. Positive patellofemoral crepitations. No lateral tilting patella. No patella apprehension. Positive crepitation in the medial and lateral femoral condyle. No proximal or distal swelling, edema or tenderness. No gross motor or sensory deficits. Mild intra-articular swelling. 2+ DP and PT pulses. No varus or valgus malalignment. Skin is intact. No rashes, scars or lesions.  Left Knee: Knee: Range of Motion in Degrees Claimant: Normal: Flexion Active 120 135-degrees Flexion Passive 120 135-degrees Extension Active 0 0-5-degrees Extension Passive 0 0-5-degrees  No weakness to flexion/extension. No evidence of instability in the AP plane or varus or valgus stress. Negative Lachman. Negative pivot shift. Negative anterior drawer test. Negative posterior drawer test. Negative Sarita. Negative Apley grind. No medial or lateral joint line tenderness. Positive tenderness over the medial and lateral facet of the patella. Positive patellofemoral crepitations. No lateral tilting patella. No patella apprehension. Positive crepitation in the medial and lateral femoral condyle. No proximal or distal swelling, edema or tenderness. No gross motor or sensory deficits. Mild intra-articular swelling. 2+ DP and PT pulses. No varus or valgus malalignment. Skin is intact. No rashes, scars or lesions.

## 2024-06-03 NOTE — ADDENDUM
[FreeTextEntry1] : This note was written by Jair Sierra on 06/03/2024, acting as a scribe for Damian Cabezas III, MD

## 2024-06-05 ENCOUNTER — APPOINTMENT (OUTPATIENT)
Dept: ORTHOPEDIC SURGERY | Facility: CLINIC | Age: 79
End: 2024-06-05
Payer: MEDICARE

## 2024-06-05 PROCEDURE — 20610 DRAIN/INJ JOINT/BURSA W/O US: CPT | Mod: 50

## 2024-06-06 NOTE — PROCEDURE
[de-identified] : Indications: Osteoarthritis of right knee  Osteoarthritis of left knee   Consent: The risks and benefits of the procedures were discussed with the patient in detail.  Upon verbal consent of the patient, we proceeded with the GenVisc 850 injections as noted below.    Description of Procedure: After a sterile prep, the patient underwent a GenVisc 850 injection of 25 mg of Sodium Hyaluronate in a 2.5 mL syringe into the right and left knee.  The patient tolerated the procedures well.  There were no complications.    :  [employer] NDC# [address]-95 Lot#:   [id number]   Expiration Date:  09/30/2026  Plan: I have recommended ice and elevation.  The patient will be reassessed in one week for the next GenVisc 850 injection for osteoarthritis of the right and left knee.

## 2024-06-06 NOTE — ADDENDUM
[FreeTextEntry1] : This note was written by Georgiana Galdamez on 06/06/2024 acting as scribe for Damian Cabezas III, MD

## 2024-06-06 NOTE — PHYSICAL EXAM
[de-identified] : Right Knee: Knee: Range of Motion in Degrees: Claimant: Normal: Flexion Active 120 135-degrees Flexion Passive 120 135-degrees Extension Active 0 0-5-degrees Extension Passive 0 0-5-degrees  No weakness to flexion/extension. No evidence of instability in the AP plane or varus or valgus stress. Negative Lachman. Negative pivot shift. Negative anterior drawer test. Negative posterior drawer test. Negative Sarita. Negative Apley grind. No medial or lateral joint line tenderness. Positive tenderness over the medial and lateral facet of the patella. Positive patellofemoral crepitations. No lateral tilting patella. No patella apprehension. Positive crepitation in the medial and lateral femoral condyle. No proximal or distal swelling, edema or tenderness. No gross motor or sensory deficits. Mild intra-articular swelling. 2+ DP and PT pulses. No varus or valgus malalignment. Skin is intact. No rashes, scars or lesions.  Left Knee: Knee: Range of Motion in Degrees: Claimant: Normal: Flexion Active 120 135-degrees Flexion Passive 120 135-degrees Extension Active 0 0-5-degrees Extension Passive 0 0-5-degrees  No weakness to flexion/extension. No evidence of instability in the AP plane or varus or valgus stress. Negative Lachman. Negative pivot shift. Negative anterior drawer test. Negative posterior drawer test. Negative Sarita. Negative Apley grind. No medial or lateral joint line tenderness. Positive tenderness over the medial and lateral facet of the patella. Positive patellofemoral crepitations. No lateral tilting patella. No patella apprehension. Positive crepitation in the medial and lateral femoral condyle. No proximal or distal swelling, edema or tenderness. No gross motor or sensory deficits. Mild intra-articular swelling. 2+ DP and PT pulses. No varus or valgus malalignment. Skin is intact. No rashes, scars or lesions.

## 2024-06-12 ENCOUNTER — APPOINTMENT (OUTPATIENT)
Dept: ORTHOPEDIC SURGERY | Facility: CLINIC | Age: 79
End: 2024-06-12
Payer: MEDICARE

## 2024-06-12 PROCEDURE — 20610 DRAIN/INJ JOINT/BURSA W/O US: CPT | Mod: 50

## 2024-06-12 NOTE — PROCEDURE
[de-identified] : Indications: Osteoarthritis of right knee Osteoarthritis of left knee  Consent: The risks and benefits of the procedures were discussed with the patient in detail. Upon verbal consent of the patient, we proceeded with the GenVisc 850 injections as noted below.  Description of Procedure: After a sterile prep, the patient underwent a GenVisc 850 injection of 25 mg of Sodium Hyaluronate in a 2.5 mL syringe into the right and left knee. The patient tolerated the procedures well. There were no complications.  : [employer] NDC# [address]-25 Lot#: [id number] Expiration Date: 09/30/2026  Plan: I have recommended ice and elevation. The patient will be reassessed in one week for the next GenVisc 850 injection for osteoarthritis of the right and left knee.

## 2024-06-12 NOTE — PHYSICAL EXAM
[de-identified] : Right Knee: Range of Motion in Degrees: Claimant: Normal: Flexion Active 120 135-degrees Flexion Passive 120 135-degrees Extension Active 0 0-5-degrees Extension Passive 0 0-5-degrees  No weakness to flexion/extension. No evidence of instability in the AP plane or varus or valgus stress. Negative Lachman. Negative pivot shift. Negative anterior drawer test. Negative posterior drawer test. Negative Sarita. Negative Apley grind. No medial or lateral joint line tenderness. Positive tenderness over the medial and lateral facet of the patella. Positive patellofemoral crepitations. No lateral tilting patella. No patella apprehension. Positive crepitation in the medial and lateral femoral condyle. No proximal or distal swelling, edema or tenderness. No gross motor or sensory deficits. Mild intra-articular swelling. 2+ DP and PT pulses. No varus or valgus malalignment. Skin is intact. No rashes, scars or lesions.  Left Knee: Range of Motion in Degrees: Claimant: Normal: Flexion Active 120 135-degrees Flexion Passive 120 135-degrees Extension Active 0 0-5-degrees Extension Passive 0 0-5-degrees  No weakness to flexion/extension. No evidence of instability in the AP plane or varus or valgus stress. Negative Lachman. Negative pivot shift. Negative anterior drawer test. Negative posterior drawer test. Negative Sarita. Negative Apley grind. No medial or lateral joint line tenderness. Positive tenderness over the medial and lateral facet of the patella. Positive patellofemoral crepitations. No lateral tilting patella. No patella apprehension. Positive crepitation in the medial and lateral femoral condyle. No proximal or distal swelling, edema or tenderness. No gross motor or sensory deficits. Mild intra-articular swelling. 2+ DP and PT pulses. No varus or valgus malalignment. Skin is intact. No rashes, scars or lesions.

## 2024-06-12 NOTE — ADDENDUM
[FreeTextEntry1] : This note was written by Viri Chris on 06/12/2024 acting as a scribe for JACOB CANNON III, MD

## 2024-06-13 RX ORDER — THYROID, PORCINE 120 MG/1
120 TABLET ORAL DAILY
Qty: 30 | Refills: 0 | Status: ACTIVE | COMMUNITY
Start: 2021-05-19 | End: 1900-01-01

## 2024-06-19 ENCOUNTER — APPOINTMENT (OUTPATIENT)
Dept: ORTHOPEDIC SURGERY | Facility: CLINIC | Age: 79
End: 2024-06-19

## 2024-06-19 DIAGNOSIS — M17.0 BILATERAL PRIMARY OSTEOARTHRITIS OF KNEE: ICD-10-CM

## 2024-06-19 PROCEDURE — 20610 DRAIN/INJ JOINT/BURSA W/O US: CPT | Mod: 50

## 2024-06-20 PROBLEM — M17.0 BILATERAL PRIMARY OSTEOARTHRITIS OF KNEE: Status: ACTIVE | Noted: 2024-05-08

## 2024-06-20 NOTE — PHYSICAL EXAM
[de-identified] : Right Knee: Knee:  Range of Motion in Degrees: Claimant: Normal: Flexion Active 120 135-degrees Flexion Passive 120 135-degrees Extension Active 0 0-5-degrees Extension Passive 0 0-5-degrees  No weakness to flexion/extension. No evidence of instability in the AP plane or varus or valgus stress. Negative Lachman. Negative pivot shift. Negative anterior drawer test. Negative posterior drawer test. Negative Sarita. Negative Apley grind. No medial or lateral joint line tenderness. Positive tenderness over the medial and lateral facet of the patella. Positive patellofemoral crepitations. No lateral tilting patella. No patella apprehension. Positive crepitation in the medial and lateral femoral condyle. No proximal or distal swelling, edema or tenderness. No gross motor or sensory deficits. Mild intra-articular swelling. 2+ DP and PT pulses. No varus or valgus malalignment. Skin is intact. No rashes, scars or lesions.  Left Knee: Knee:  Range of Motion in Degrees: Claimant: Normal: Flexion Active 120 135-degrees Flexion Passive 120 135-degrees Extension Active 0 0-5-degrees Extension Passive 0 0-5-degrees  No weakness to flexion/extension. No evidence of instability in the AP plane or varus or valgus stress. Negative Lachman. Negative pivot shift. Negative anterior drawer test. Negative posterior drawer test. Negative Sarita. Negative Apley grind. No medial or lateral joint line tenderness. Positive tenderness over the medial and lateral facet of the patella. Positive patellofemoral crepitations. No lateral tilting patella. No patella apprehension. Positive crepitation in the medial and lateral femoral condyle. No proximal or distal swelling, edema or tenderness. No gross motor or sensory deficits. Mild intra-articular swelling. 2+ DP and PT pulses. No varus or valgus malalignment. Skin is intact. No rashes, scars or lesions.

## 2024-06-20 NOTE — PROCEDURE
[de-identified] : Indications: Osteoarthritis of right knee Osteoarthritis of left knee  Consent: The risks and benefits of the procedures were discussed with the patient in detail. Upon verbal consent of the patient, we proceeded with the GenVisc 850 injections as noted below.  Description of Procedure: After a sterile prep, the patient underwent a GenVisc 850 injection of 25 mg of Sodium Hyaluronate in a 2.5 mL syringe into the right and left knee. The patient tolerated the procedures well. There were no complications.  : [employer] NDC# [address]-67 Lot#: [id number] Expiration Date: 09/30/2026  Plan: I have recommended ice and elevation. The patient will be reassessed in 6-8 weeks for the osteoarthritis of the right and left knee.

## 2024-06-20 NOTE — ADDENDUM
[FreeTextEntry1] : This note was written by Georgiana Galdamez on 06/20/2024 acting as scribe for Damian Cabezas III, MD

## 2024-08-12 ENCOUNTER — APPOINTMENT (OUTPATIENT)
Dept: FAMILY MEDICINE | Facility: CLINIC | Age: 79
End: 2024-08-12
Payer: MEDICARE

## 2024-08-12 VITALS
WEIGHT: 189 LBS | HEART RATE: 74 BPM | OXYGEN SATURATION: 95 % | SYSTOLIC BLOOD PRESSURE: 130 MMHG | DIASTOLIC BLOOD PRESSURE: 82 MMHG | TEMPERATURE: 97.8 F | BODY MASS INDEX: 37.11 KG/M2 | HEIGHT: 60 IN

## 2024-08-12 DIAGNOSIS — K59.00 CONSTIPATION, UNSPECIFIED: ICD-10-CM

## 2024-08-12 DIAGNOSIS — Z00.00 ENCOUNTER FOR GENERAL ADULT MEDICAL EXAMINATION W/OUT ABNORMAL FINDINGS: ICD-10-CM

## 2024-08-12 DIAGNOSIS — Z60.2 PROBLEMS RELATED TO LIVING ALONE: ICD-10-CM

## 2024-08-12 DIAGNOSIS — I10 ESSENTIAL (PRIMARY) HYPERTENSION: ICD-10-CM

## 2024-08-12 PROCEDURE — G0439: CPT

## 2024-08-12 SDOH — SOCIAL STABILITY - SOCIAL INSECURITY: PROBLEMS RELATED TO LIVING ALONE: Z60.2

## 2024-08-13 PROBLEM — Z00.00 ENCOUNTER FOR ANNUAL WELLNESS VISIT (AWV) IN MEDICARE PATIENT: Status: ACTIVE | Noted: 2024-08-12

## 2024-08-13 RX ORDER — SUVOREXANT 10 MG/1
10 TABLET, FILM COATED ORAL
Qty: 30 | Refills: 0 | Status: ACTIVE | COMMUNITY
Start: 2024-07-30

## 2024-08-13 RX ORDER — MIRTAZAPINE 45 MG/1
45 TABLET, FILM COATED ORAL
Qty: 90 | Refills: 0 | Status: ACTIVE | COMMUNITY
Start: 2024-08-07

## 2024-08-13 NOTE — HEALTH RISK ASSESSMENT
[Never (0 pts)] : Never (0 points) [No] : In the past 12 months have you used drugs other than those required for medical reasons? No [0] : 2) Feeling down, depressed, or hopeless: Not at all (0) [Never] : Never [Patient reported mammogram was abnormal] : Patient reported mammogram was abnormal [PHQ-2 Negative - No further assessment needed] : PHQ-2 Negative - No further assessment needed [Very Good] : ~his/her~  mood as very good [One fall no injury in past year] : Patient reported one fall in the past year without injury [Audit-CScore] : 0 [de-identified] : short walk [de-identified] : orders in, orders peapod delivery  [[id number]] : 0 [EyeExamDate] : 02/2024 [Alone] : lives alone [Fully functional (bathing, dressing, toileting, transferring, walking, feeding)] : Fully functional (bathing, dressing, toileting, transferring, walking, feeding) [Fully functional (using the telephone, shopping, preparing meals, housekeeping, doing laundry, using] : Fully functional and needs no help or supervision to perform IADLs (using the telephone, shopping, preparing meals, housekeeping, doing laundry, using transportation, managing medications and managing finances) [Reports changes in hearing] : Reports no changes in hearing [Reports changes in vision] : Reports no changes in vision [Reports changes in dental health] : Reports no changes in dental health [MammogramDate] : 08/2023 [PapSmearDate] : 08/2024 [BoneDensityDate] : 2022

## 2024-08-13 NOTE — HISTORY OF PRESENT ILLNESS
[de-identified] : 78-year-old woman with history of schizophrenia, hypothyroidism, HTN, chronic venous stasis of lower extremities.  Presents for AWV.   Her home health aide continues to come twice a week, as this is what she can afford. However she is in [place] for the next few weeks, so she is alone. She still drives She wears a life alert bracelet. Her family lives in [place]  She would like to resume swimming/physical exercise at the pool and the [facility], but has yet to do this.  She reports constipation.

## 2024-08-13 NOTE — HISTORY OF PRESENT ILLNESS
[de-identified] : 78-year-old woman with history of schizophrenia, hypothyroidism, HTN, chronic venous stasis of lower extremities.  Presents for AWV.   Her home health aide continues to come twice a week, as this is what she can afford. However she is in [place] for the next few weeks, so she is alone. She still drives She wears a life alert bracelet. Her family lives in [place]  She would like to resume swimming/physical exercise at the pool and the [facility], but has yet to do this.  She reports constipation.

## 2024-08-13 NOTE — HEALTH RISK ASSESSMENT
[Never (0 pts)] : Never (0 points) [No] : In the past 12 months have you used drugs other than those required for medical reasons? No [0] : 2) Feeling down, depressed, or hopeless: Not at all (0) [Never] : Never [Patient reported mammogram was abnormal] : Patient reported mammogram was abnormal [PHQ-2 Negative - No further assessment needed] : PHQ-2 Negative - No further assessment needed [Very Good] : ~his/her~  mood as very good [One fall no injury in past year] : Patient reported one fall in the past year without injury [Audit-CScore] : 0 [de-identified] : short walk [de-identified] : orders in, orders peapod delivery  [[id number]] : 0 [EyeExamDate] : 02/2024 [Alone] : lives alone [Fully functional (bathing, dressing, toileting, transferring, walking, feeding)] : Fully functional (bathing, dressing, toileting, transferring, walking, feeding) [Fully functional (using the telephone, shopping, preparing meals, housekeeping, doing laundry, using] : Fully functional and needs no help or supervision to perform IADLs (using the telephone, shopping, preparing meals, housekeeping, doing laundry, using transportation, managing medications and managing finances) [Reports changes in hearing] : Reports no changes in hearing [Reports changes in vision] : Reports no changes in vision [Reports changes in dental health] : Reports no changes in dental health [MammogramDate] : 08/2023 [PapSmearDate] : 08/2024 [BoneDensityDate] : 2022

## 2024-08-26 ENCOUNTER — APPOINTMENT (OUTPATIENT)
Dept: INTERNAL MEDICINE | Facility: CLINIC | Age: 79
End: 2024-08-26

## 2024-08-26 PROCEDURE — 36415 COLL VENOUS BLD VENIPUNCTURE: CPT

## 2024-10-21 NOTE — CHART NOTE - NSCHARTNOTEFT_GEN_A_CORE
Pt is a 76 y/o female who lives alone with hx of schizophrenia, htn, hypothyroid.  Pt is under the care of Dr. Mazin Drake Psychiatrist [phone] Pt is a 78 y/o female who lives alone with hx of schizophrenia, htn, hypothyroid.  Pt is under the care of Dr. Mazin Drake Psychiatrist [phone].  Pt has been in the ER 2/25, 2/27, 2/28, 3/1 for c/0 of abdominal pain.  Pt was evaluated and dx with Epiploic appendagitis, AMS.  Pt was also evaluated by psych. Pt has been cleared for d/c. Pt was given home meds in ER.  Pt is alert and oriented.  Yanni met with pt and spoke with her nephew Toi Sinclair [phone] via telephone to discuss d/c plan. Pt reports she would like to have a full time live in aide.  Pt stated a friend from her [employer] [place] Strickland [phone] is working with  [employer] to get her an aide.  Yanni spoke to Ms. Strickland who confirmed above but stated she is unable to get an aide until Friday or Saturday.  Yanni discussed with pt and Toi her nephew a referral to [facility] which they were agreeable to.   YANNI provided a private hire list to Toi by email  [email or web address].  YANNI also provided name of private hire aide to family that may be available tomorrow.  Pts  nephew feels it is safe for pt to return home.  Nephew reports pt has neighbors that look after pt.  Pt lives alone in her own home with 2 CARLYLE no steps inside and ambulates with a cane and has been able to perform her own ADL's.  Pt does not drive but has food delivered to the home. Pt reports she wants to return  home.  Pt nephew Toi feels it is safe for pt to return home.   Pt will have an ambulette transport arranged for transport home today.  Case discussed with RN/MD. No Exposure

## 2024-11-27 ENCOUNTER — APPOINTMENT (OUTPATIENT)
Dept: FAMILY MEDICINE | Facility: CLINIC | Age: 79
End: 2024-11-27
Payer: COMMERCIAL

## 2024-11-27 VITALS
HEART RATE: 84 BPM | HEIGHT: 60 IN | TEMPERATURE: 97.7 F | DIASTOLIC BLOOD PRESSURE: 75 MMHG | WEIGHT: 186 LBS | OXYGEN SATURATION: 95 % | BODY MASS INDEX: 36.52 KG/M2 | SYSTOLIC BLOOD PRESSURE: 148 MMHG

## 2024-11-27 DIAGNOSIS — E03.8 OTHER SPECIFIED HYPOTHYROIDISM: ICD-10-CM

## 2024-11-27 DIAGNOSIS — L03.032 CELLULITIS OF LEFT TOE: ICD-10-CM

## 2024-11-27 DIAGNOSIS — M25.562 PAIN IN LEFT KNEE: ICD-10-CM

## 2024-11-27 DIAGNOSIS — N30.00 ACUTE CYSTITIS W/OUT HEMATURIA: ICD-10-CM

## 2024-11-27 DIAGNOSIS — Z87.898 PERSONAL HISTORY OF OTHER SPECIFIED CONDITIONS: ICD-10-CM

## 2024-11-27 DIAGNOSIS — E78.00 PURE HYPERCHOLESTEROLEMIA, UNSPECIFIED: ICD-10-CM

## 2024-11-27 DIAGNOSIS — N31.9 NEUROMUSCULAR DYSFUNCTION OF BLADDER, UNSPECIFIED: ICD-10-CM

## 2024-11-27 DIAGNOSIS — R73.09 OTHER ABNORMAL GLUCOSE: ICD-10-CM

## 2024-11-27 DIAGNOSIS — M17.12 UNILATERAL PRIMARY OSTEOARTHRITIS, LEFT KNEE: ICD-10-CM

## 2024-11-27 DIAGNOSIS — Z87.440 PERSONAL HISTORY OF URINARY (TRACT) INFECTIONS: ICD-10-CM

## 2024-11-27 DIAGNOSIS — G89.29 PAIN IN LEFT KNEE: ICD-10-CM

## 2024-11-27 DIAGNOSIS — E03.9 HYPOTHYROIDISM, UNSPECIFIED: ICD-10-CM

## 2024-11-27 DIAGNOSIS — J30.89 OTHER ALLERGIC RHINITIS: ICD-10-CM

## 2024-11-27 DIAGNOSIS — I10 ESSENTIAL (PRIMARY) HYPERTENSION: ICD-10-CM

## 2024-11-27 PROCEDURE — G2211 COMPLEX E/M VISIT ADD ON: CPT | Mod: NC

## 2024-11-27 PROCEDURE — 99214 OFFICE O/P EST MOD 30 MIN: CPT

## 2024-11-27 PROCEDURE — 99204 OFFICE O/P NEW MOD 45 MIN: CPT

## 2025-01-09 NOTE — H&P ADULT - NSHPLANGLIMITEDENGLISH_GEN_A_CORE
Received an email from Isidro at [facility] inquiring who the  is for member and looking for an authorization for service.     Spoke with Isidro to inform him that member is on the CADI Waiver and has a CADI CM through [facility] who does the authorization for ADC. Isidro stated that he will call [facility] for the CADI CM.     Alexandra Ortiz, [facility]  [phone]    No

## 2025-01-27 ENCOUNTER — OFFICE (OUTPATIENT)
Dept: URBAN - METROPOLITAN AREA CLINIC 102 | Facility: CLINIC | Age: 80
Setting detail: OPHTHALMOLOGY
End: 2025-01-27
Payer: MEDICARE

## 2025-01-27 DIAGNOSIS — H40.033: ICD-10-CM

## 2025-01-27 DIAGNOSIS — H35.363: ICD-10-CM

## 2025-01-27 DIAGNOSIS — H52.4: ICD-10-CM

## 2025-01-27 DIAGNOSIS — H25.13: ICD-10-CM

## 2025-01-27 PROCEDURE — 92020 GONIOSCOPY: CPT | Performed by: OPHTHALMOLOGY

## 2025-01-27 PROCEDURE — 92014 COMPRE OPH EXAM EST PT 1/>: CPT | Performed by: OPHTHALMOLOGY

## 2025-01-27 PROCEDURE — 92015 DETERMINE REFRACTIVE STATE: CPT | Performed by: OPHTHALMOLOGY

## 2025-01-27 ASSESSMENT — REFRACTION_MANIFEST
OS_CYLINDER: -1.25
OS_CYLINDER: -1.25
OD_CYLINDER: -0.75
OD_CYLINDER: -0.75
OS_AXIS: 100
OD_AXIS: 80
OS_ADD: +2.75
OD_SPHERE: +1.50
OD_ADD: +2.75
OD_AXIS: 80
OS_SPHERE: +1.75
OD_VA1: 20/20
OD_ADD: +3.00
OS_VA1: 20/25
OS_AXIS: 100
OS_VA1: 20/20
OD_SPHERE: +1.50
OS_SPHERE: +1.50
OD_VA1: 20/25

## 2025-01-27 ASSESSMENT — REFRACTION_CURRENTRX
OS_SPHERE: +1.75
OD_AXIS: 80
OS_ADD: +2.50
OS_CYLINDER: -1.25
OD_CYLINDER: -0.75
OD_SPHERE: +1.50
OD_ADD: +2.50
OD_VPRISM_DIRECTION: PROGS
OS_VPRISM_DIRECTION: PROGS
OS_OVR_VA: 20/
OS_AXIS: 100
OD_OVR_VA: 20/

## 2025-01-27 ASSESSMENT — KERATOMETRY
OD_AXISANGLE_DEGREES: 166
OD_K2POWER_DIOPTERS: 44.75
METHOD_AUTO_MANUAL: AUTO
OS_K1POWER_DIOPTERS: 44.25
OS_AXISANGLE_DEGREES: 177
OD_K1POWER_DIOPTERS: 44.25
OS_K2POWER_DIOPTERS: 44.75

## 2025-01-27 ASSESSMENT — TONOMETRY
OD_IOP_MMHG: 16
OS_IOP_MMHG: 17

## 2025-01-27 ASSESSMENT — REFRACTION_AUTOREFRACTION
OS_CYLINDER: -1.50
OD_CYLINDER: -0.75
OS_AXIS: 098
OD_AXIS: 082
OS_SPHERE: +2.25
OD_SPHERE: +2.00

## 2025-01-27 ASSESSMENT — CONFRONTATIONAL VISUAL FIELD TEST (CVF)
OD_FINDINGS: FULL
OS_FINDINGS: FULL

## 2025-01-27 ASSESSMENT — VISUAL ACUITY
OS_BCVA: 20/25-2
OD_BCVA: 20/25-2

## 2025-02-12 NOTE — PHYSICAL EXAM
[No Acute Distress] : no acute distress [Normal Sclera/Conjunctiva] : normal sclera/conjunctiva [Normal Outer Ear/Nose] : the outer ears and nose were normal in appearance [No Lymphadenopathy] : no lymphadenopathy [No Respiratory Distress] : no respiratory distress  [No Accessory Muscle Use] : no accessory muscle use [Clear to Auscultation] : lungs were clear to auscultation bilaterally [Normal Rate] : normal rate  [Regular Rhythm] : with a regular rhythm [Normal S1, S2] : normal S1 and S2 [Normal Anterior Cervical Nodes] : no anterior cervical lymphadenopathy [No Rash] : no rash [de-identified] : Left big toe has mild swelling, erythema, yellowish drainage [de-identified] : chronic venous statis no

## 2025-03-25 ENCOUNTER — APPOINTMENT (OUTPATIENT)
Dept: FAMILY MEDICINE | Facility: CLINIC | Age: 80
End: 2025-03-25
Payer: MEDICARE

## 2025-03-25 VITALS
DIASTOLIC BLOOD PRESSURE: 90 MMHG | TEMPERATURE: 97.6 F | WEIGHT: 193 LBS | HEIGHT: 60 IN | HEART RATE: 68 BPM | OXYGEN SATURATION: 97 % | BODY MASS INDEX: 37.89 KG/M2 | SYSTOLIC BLOOD PRESSURE: 130 MMHG

## 2025-03-25 DIAGNOSIS — I10 ESSENTIAL (PRIMARY) HYPERTENSION: ICD-10-CM

## 2025-03-25 DIAGNOSIS — I48.0 PAROXYSMAL ATRIAL FIBRILLATION: ICD-10-CM

## 2025-03-25 DIAGNOSIS — E03.9 HYPOTHYROIDISM, UNSPECIFIED: ICD-10-CM

## 2025-03-25 DIAGNOSIS — I87.2 VENOUS INSUFFICIENCY (CHRONIC) (PERIPHERAL): ICD-10-CM

## 2025-03-25 DIAGNOSIS — F20.9 SCHIZOPHRENIA, UNSPECIFIED: ICD-10-CM

## 2025-03-25 DIAGNOSIS — N31.9 NEUROMUSCULAR DYSFUNCTION OF BLADDER, UNSPECIFIED: ICD-10-CM

## 2025-03-25 DIAGNOSIS — E78.00 PURE HYPERCHOLESTEROLEMIA, UNSPECIFIED: ICD-10-CM

## 2025-03-25 DIAGNOSIS — R73.09 OTHER ABNORMAL GLUCOSE: ICD-10-CM

## 2025-03-25 PROCEDURE — 36415 COLL VENOUS BLD VENIPUNCTURE: CPT

## 2025-03-25 PROCEDURE — 99214 OFFICE O/P EST MOD 30 MIN: CPT

## 2025-03-25 PROCEDURE — G2211 COMPLEX E/M VISIT ADD ON: CPT

## 2025-03-26 LAB
ALBUMIN SERPL ELPH-MCNC: 4.5 G/DL
ALP BLD-CCNC: 110 U/L
ALT SERPL-CCNC: 28 U/L
ANION GAP SERPL CALC-SCNC: 14 MMOL/L
AST SERPL-CCNC: 20 U/L
BASOPHILS # BLD AUTO: 0.04 K/UL
BASOPHILS NFR BLD AUTO: 0.7 %
BILIRUB SERPL-MCNC: 0.3 MG/DL
BUN SERPL-MCNC: 29 MG/DL
CALCIUM SERPL-MCNC: 9.7 MG/DL
CHLORIDE SERPL-SCNC: 104 MMOL/L
CHOLEST SERPL-MCNC: 190 MG/DL
CO2 SERPL-SCNC: 22 MMOL/L
CREAT SERPL-MCNC: 1.09 MG/DL
EGFRCR SERPLBLD CKD-EPI 2021: 52 ML/MIN/1.73M2
EOSINOPHIL # BLD AUTO: 0.21 K/UL
EOSINOPHIL NFR BLD AUTO: 3.8 %
ESTIMATED AVERAGE GLUCOSE: 117 MG/DL
GLUCOSE SERPL-MCNC: 101 MG/DL
HBA1C MFR BLD HPLC: 5.7 %
HCT VFR BLD CALC: 45.6 %
HDLC SERPL-MCNC: 40 MG/DL
HGB BLD-MCNC: 14.6 G/DL
IMM GRANULOCYTES NFR BLD AUTO: 0.4 %
LDLC SERPL-MCNC: 132 MG/DL
LYMPHOCYTES # BLD AUTO: 1.58 K/UL
LYMPHOCYTES NFR BLD AUTO: 28.7 %
MAN DIFF?: NORMAL
MCHC RBC-ENTMCNC: 30.7 PG
MCHC RBC-ENTMCNC: 32 G/DL
MCV RBC AUTO: 95.8 FL
MONOCYTES # BLD AUTO: 0.51 K/UL
MONOCYTES NFR BLD AUTO: 9.3 %
NEUTROPHILS # BLD AUTO: 3.15 K/UL
NEUTROPHILS NFR BLD AUTO: 57.1 %
NONHDLC SERPL-MCNC: 150 MG/DL
PLATELET # BLD AUTO: 192 K/UL
POTASSIUM SERPL-SCNC: 4.1 MMOL/L
PROT SERPL-MCNC: 7.2 G/DL
RBC # BLD: 4.76 M/UL
RBC # FLD: 13.2 %
SODIUM SERPL-SCNC: 140 MMOL/L
TRIGL SERPL-MCNC: 102 MG/DL
TSH SERPL-ACNC: 3.39 UIU/ML
WBC # FLD AUTO: 5.51 K/UL

## 2025-03-27 ENCOUNTER — NON-APPOINTMENT (OUTPATIENT)
Age: 80
End: 2025-03-27

## 2025-04-11 ENCOUNTER — APPOINTMENT (OUTPATIENT)
Dept: VASCULAR SURGERY | Facility: CLINIC | Age: 80
End: 2025-04-11
Payer: MEDICARE

## 2025-04-11 VITALS
HEART RATE: 68 BPM | DIASTOLIC BLOOD PRESSURE: 84 MMHG | OXYGEN SATURATION: 92 % | HEIGHT: 60 IN | BODY MASS INDEX: 37.3 KG/M2 | WEIGHT: 190 LBS | SYSTOLIC BLOOD PRESSURE: 136 MMHG

## 2025-04-11 DIAGNOSIS — I87.2 VENOUS INSUFFICIENCY (CHRONIC) (PERIPHERAL): ICD-10-CM

## 2025-04-11 DIAGNOSIS — I82.409 ACUTE EMBOLISM AND THROMBOSIS OF UNSPECIFIED DEEP VEINS OF UNSPECIFIED LOWER EXTREMITY: ICD-10-CM

## 2025-04-11 PROCEDURE — 93970 EXTREMITY STUDY: CPT

## 2025-04-11 PROCEDURE — 99213 OFFICE O/P EST LOW 20 MIN: CPT

## 2025-04-22 NOTE — ED BEHAVIORAL HEALTH ASSESSMENT NOTE - SUBSTANCE USE
PA for nebivolol 10 mg  DENIED    Reason: Needs to fail one other beta blocker other than metoprolol      Message sent to office clinical pool Yes    Denial letter scanned into Media Yes    Appeal started No (Provider will need to decide if appeal is warranted and send clinical documentation to Prior Authorization Team for initiation.)    **Please follow up with your patient regarding denial and next steps**     None known

## 2025-04-24 ENCOUNTER — APPOINTMENT (OUTPATIENT)
Dept: VASCULAR SURGERY | Facility: CLINIC | Age: 80
End: 2025-04-24

## 2025-05-09 ENCOUNTER — APPOINTMENT (OUTPATIENT)
Dept: INTERNAL MEDICINE | Facility: CLINIC | Age: 80
End: 2025-05-09
Payer: MEDICARE

## 2025-05-09 ENCOUNTER — RESULT REVIEW (OUTPATIENT)
Age: 80
End: 2025-05-09

## 2025-05-09 VITALS
WEIGHT: 200 LBS | TEMPERATURE: 97.2 F | HEART RATE: 73 BPM | HEIGHT: 60 IN | DIASTOLIC BLOOD PRESSURE: 68 MMHG | BODY MASS INDEX: 39.27 KG/M2 | OXYGEN SATURATION: 96 % | SYSTOLIC BLOOD PRESSURE: 114 MMHG

## 2025-05-09 DIAGNOSIS — M62.838 OTHER MUSCLE SPASM: ICD-10-CM

## 2025-05-09 DIAGNOSIS — M54.2 CERVICALGIA: ICD-10-CM

## 2025-05-09 PROCEDURE — 99213 OFFICE O/P EST LOW 20 MIN: CPT

## 2025-05-09 RX ORDER — METHOCARBAMOL 1000 MG/1
1000 TABLET, COATED ORAL
Qty: 10 | Refills: 0 | Status: ACTIVE | COMMUNITY
Start: 2025-05-09 | End: 1900-01-01

## 2025-05-09 RX ORDER — MELOXICAM 7.5 MG/1
7.5 TABLET ORAL TWICE DAILY
Qty: 20 | Refills: 0 | Status: ACTIVE | COMMUNITY
Start: 2025-05-09 | End: 1900-01-01

## 2025-05-09 RX ORDER — FAMOTIDINE 20 MG/1
20 TABLET, FILM COATED ORAL TWICE DAILY
Qty: 28 | Refills: 0 | Status: ACTIVE | COMMUNITY
Start: 2025-05-09 | End: 1900-01-01

## 2025-05-12 ENCOUNTER — APPOINTMENT (OUTPATIENT)
Dept: RADIOLOGY | Facility: CLINIC | Age: 80
End: 2025-05-12
Payer: MEDICARE

## 2025-05-12 ENCOUNTER — OUTPATIENT (OUTPATIENT)
Dept: OUTPATIENT SERVICES | Facility: HOSPITAL | Age: 80
LOS: 1 days | End: 2025-05-12
Payer: MEDICARE

## 2025-05-12 DIAGNOSIS — M54.2 CERVICALGIA: ICD-10-CM

## 2025-05-12 PROCEDURE — 72040 X-RAY EXAM NECK SPINE 2-3 VW: CPT

## 2025-05-12 PROCEDURE — 72040 X-RAY EXAM NECK SPINE 2-3 VW: CPT | Mod: 26

## 2025-05-23 NOTE — ADDENDUM
Patient called asking about his sleep study results and stated that the sleep study center told him they were going to order him a cpap and supplies.I called [facility] and they were already closed for the day. I will follow up with this on Tuesday.    I left message informing patient of this   [FreeTextEntry1] : This note was written by Jair Sierra on 01/23/2023, acting as a scribe for Damian Cabezas III, MD

## 2025-06-05 ENCOUNTER — APPOINTMENT (OUTPATIENT)
Dept: UROGYNECOLOGY | Facility: CLINIC | Age: 80
End: 2025-06-05

## 2025-06-05 VITALS
WEIGHT: 180 LBS | BODY MASS INDEX: 35.34 KG/M2 | DIASTOLIC BLOOD PRESSURE: 88 MMHG | OXYGEN SATURATION: 92 % | HEIGHT: 60 IN | HEART RATE: 72 BPM | SYSTOLIC BLOOD PRESSURE: 142 MMHG

## 2025-06-05 LAB
BILIRUB UR QL STRIP: NEGATIVE
CLARITY UR: CLEAR
COLLECTION METHOD: NORMAL
GLUCOSE UR-MCNC: NEGATIVE
HCG UR QL: 0.2 EU/DL
HGB UR QL STRIP.AUTO: NORMAL
KETONES UR-MCNC: NEGATIVE
LEUKOCYTE ESTERASE UR QL STRIP: NORMAL
NITRITE UR QL STRIP: NEGATIVE
PH UR STRIP: 6
PROT UR STRIP-MCNC: NEGATIVE
SP GR UR STRIP: 1.01

## 2025-06-05 PROCEDURE — 99459 PELVIC EXAMINATION: CPT

## 2025-06-05 PROCEDURE — 51701 INSERT BLADDER CATHETER: CPT

## 2025-06-05 PROCEDURE — 99204 OFFICE O/P NEW MOD 45 MIN: CPT | Mod: 25

## 2025-06-05 PROCEDURE — 81003 URINALYSIS AUTO W/O SCOPE: CPT | Mod: QW

## 2025-06-05 RX ORDER — VIBEGRON 75 MG/1
75 TABLET, FILM COATED ORAL
Qty: 90 | Refills: 0 | Status: ACTIVE | COMMUNITY
Start: 2025-06-05 | End: 1900-01-01

## 2025-06-09 LAB
APPEARANCE: CLEAR
BACTERIA UR CULT: NORMAL
BACTERIA: NEGATIVE /HPF
BILIRUBIN URINE: NEGATIVE
BLOOD URINE: NEGATIVE
CAST: 1 /LPF
COLOR: YELLOW
EPITHELIAL CELLS: 0 /HPF
GLUCOSE QUALITATIVE U: NEGATIVE MG/DL
KETONES URINE: NEGATIVE MG/DL
LEUKOCYTE ESTERASE URINE: NEGATIVE
MICROSCOPIC-UA: NORMAL
NITRITE URINE: NEGATIVE
PH URINE: 6
PROTEIN URINE: NEGATIVE MG/DL
RED BLOOD CELLS URINE: 0 /HPF
SPECIFIC GRAVITY URINE: 1.02
UROBILINOGEN URINE: 0.2 MG/DL
WHITE BLOOD CELLS URINE: 0 /HPF

## 2025-06-25 NOTE — H&P ADULT - ASSESSMENT
73yo female with PMhx as stated a/w weakness, CP    Weakness  Fatigue  CP  HTN  Depression  Hypothyroid    - currently afebrile, HD stable, comfortable in NAd  - no CP, SOB currently  - EKG NSR, nml axis, NSST changes  - mildly elevated troponin, cardiology consulted    PLAN:  - supp o2 as needed, duonebs PRN  - NO ID issues  - ECHO  - ASA, Statin, BB  - cardio eval  - IVF  - BP control  - Abilify, Celexa  - GI ppx  - DVT ppx  - PT eval  - Admit
crying/guarding

## 2025-06-30 PROBLEM — R39.15 URINARY URGENCY: Status: ACTIVE | Noted: 2025-06-30

## 2025-06-30 PROBLEM — E66.9 OBESITY: Status: ACTIVE | Noted: 2025-06-30

## 2025-06-30 PROBLEM — R33.9 URINARY RETENTION: Status: ACTIVE | Noted: 2025-06-30

## 2025-06-30 PROBLEM — E78.00 HIGH CHOLESTEROL: Status: ACTIVE | Noted: 2025-06-30

## 2025-06-30 PROBLEM — R35.1 NOCTURIA: Status: ACTIVE | Noted: 2025-06-30

## 2025-06-30 PROBLEM — Z87.09 HISTORY OF THROAT PROBLEM: Status: ACTIVE | Noted: 2025-06-30

## 2025-06-30 RX ORDER — CLONAZEPAM 2 MG/1
2 TABLET ORAL
Refills: 0 | Status: ACTIVE | COMMUNITY

## 2025-06-30 RX ORDER — EZETIMIBE 10 MG/1
10 TABLET ORAL
Refills: 0 | Status: ACTIVE | COMMUNITY

## 2025-06-30 RX ORDER — MIRTAZAPINE 45 MG/1
45 TABLET, FILM COATED ORAL
Refills: 0 | Status: ACTIVE | COMMUNITY

## 2025-07-07 ENCOUNTER — APPOINTMENT (OUTPATIENT)
Dept: UROGYNECOLOGY | Facility: CLINIC | Age: 80
End: 2025-07-07
Payer: MEDICARE

## 2025-07-07 VITALS
SYSTOLIC BLOOD PRESSURE: 138 MMHG | HEART RATE: 63 BPM | BODY MASS INDEX: 35.34 KG/M2 | HEIGHT: 60 IN | DIASTOLIC BLOOD PRESSURE: 85 MMHG | WEIGHT: 180 LBS | OXYGEN SATURATION: 93 %

## 2025-07-07 PROBLEM — N39.41 URGE INCONTINENCE OF URINE: Status: ACTIVE | Noted: 2025-07-07

## 2025-07-07 PROCEDURE — 99213 OFFICE O/P EST LOW 20 MIN: CPT

## 2025-07-07 PROCEDURE — 51798 US URINE CAPACITY MEASURE: CPT

## 2025-07-29 ENCOUNTER — OFFICE (OUTPATIENT)
Dept: URBAN - METROPOLITAN AREA CLINIC 102 | Facility: CLINIC | Age: 80
Setting detail: OPHTHALMOLOGY
End: 2025-07-29
Payer: MEDICARE

## 2025-07-29 DIAGNOSIS — H35.363: ICD-10-CM

## 2025-07-29 DIAGNOSIS — H25.13: ICD-10-CM

## 2025-07-29 DIAGNOSIS — H52.4: ICD-10-CM

## 2025-07-29 DIAGNOSIS — H40.033: ICD-10-CM

## 2025-07-29 DIAGNOSIS — D31.31: ICD-10-CM

## 2025-07-29 PROCEDURE — 92133 CPTRZD OPH DX IMG PST SGM ON: CPT | Performed by: OPHTHALMOLOGY

## 2025-07-29 PROCEDURE — 92015 DETERMINE REFRACTIVE STATE: CPT | Performed by: OPHTHALMOLOGY

## 2025-07-29 PROCEDURE — 92014 COMPRE OPH EXAM EST PT 1/>: CPT | Performed by: OPHTHALMOLOGY

## 2025-07-29 PROCEDURE — 92083 EXTENDED VISUAL FIELD XM: CPT | Performed by: OPHTHALMOLOGY

## 2025-07-29 PROCEDURE — 92020 GONIOSCOPY: CPT | Performed by: OPHTHALMOLOGY

## 2025-07-29 ASSESSMENT — REFRACTION_AUTOREFRACTION
OS_AXIS: 87
OD_AXIS: 079
OS_SPHERE: +2.50
OD_CYLINDER: -0.75
OD_SPHERE: +2.00
OS_CYLINDER: -1.50

## 2025-07-29 ASSESSMENT — REFRACTION_CURRENTRX
OD_AXIS: 80
OS_SPHERE: +1.75
OS_ADD: +2.50
OS_VPRISM_DIRECTION: PROGS
OD_CYLINDER: -0.75
OS_CYLINDER: -1.25
OD_VPRISM_DIRECTION: PROGS
OD_SPHERE: +1.50
OS_OVR_VA: 20/
OD_OVR_VA: 20/
OS_AXIS: 100
OD_ADD: +2.50

## 2025-07-29 ASSESSMENT — REFRACTION_MANIFEST
OD_AXIS: 080
OD_CYLINDER: -1.00
OD_CYLINDER: -0.75
OD_SPHERE: +1.75
OS_SPHERE: +1.75
OD_SPHERE: +1.50
OS_VA1: 20/25
OS_AXIS: 100
OD_ADD: +3.00
OD_ADD: +2.50
OS_AXIS: 100
OD_VA1: 20/20
OS_SPHERE: +2.00
OS_CYLINDER: -1.25
OS_VA1: 20/20
OD_AXIS: 80
OS_ADD: +2.50
OD_VA1: 20/25+1
OS_CYLINDER: -1.25

## 2025-07-29 ASSESSMENT — TONOMETRY
OD_IOP_MMHG: 17
OS_IOP_MMHG: 21
OS_IOP_MMHG: 17

## 2025-07-29 ASSESSMENT — KERATOMETRY
OS_AXISANGLE_DEGREES: 114
OD_K1POWER_DIOPTERS: 44.25
METHOD_AUTO_MANUAL: AUTO
OS_K2POWER_DIOPTERS: 45.50
OD_AXISANGLE_DEGREES: 124
OD_K2POWER_DIOPTERS: 44.50
OS_K1POWER_DIOPTERS: 43.00

## 2025-07-29 ASSESSMENT — VISUAL ACUITY
OS_BCVA: 20/25
OD_BCVA: 20/25-1

## 2025-07-29 ASSESSMENT — CONFRONTATIONAL VISUAL FIELD TEST (CVF)
OD_FINDINGS: FULL
OS_FINDINGS: FULL

## 2025-07-30 PROBLEM — H11.151 PINGUECULA ; RIGHT EYE: Status: ACTIVE | Noted: 2025-07-30

## 2025-08-07 ENCOUNTER — APPOINTMENT (OUTPATIENT)
Dept: INTERNAL MEDICINE | Facility: CLINIC | Age: 80
End: 2025-08-07

## 2025-08-11 ENCOUNTER — INPATIENT (INPATIENT)
Facility: HOSPITAL | Age: 80
LOS: 1 days | Discharge: HOME HEALTH | DRG: 948 | End: 2025-08-13
Attending: HOSPITALIST | Admitting: INTERNAL MEDICINE
Payer: MEDICARE

## 2025-08-11 VITALS
SYSTOLIC BLOOD PRESSURE: 156 MMHG | WEIGHT: 179.9 LBS | TEMPERATURE: 98 F | OXYGEN SATURATION: 93 % | HEART RATE: 104 BPM | RESPIRATION RATE: 18 BRPM | DIASTOLIC BLOOD PRESSURE: 80 MMHG

## 2025-08-11 DIAGNOSIS — R53.1 WEAKNESS: ICD-10-CM

## 2025-08-11 LAB
ALBUMIN SERPL ELPH-MCNC: 3.3 G/DL — SIGNIFICANT CHANGE UP (ref 3.3–5)
ALP SERPL-CCNC: 81 U/L — SIGNIFICANT CHANGE UP (ref 40–120)
ALT FLD-CCNC: 34 U/L — SIGNIFICANT CHANGE UP (ref 12–78)
ANION GAP SERPL CALC-SCNC: 7 MMOL/L — SIGNIFICANT CHANGE UP (ref 5–17)
APPEARANCE UR: ABNORMAL
AST SERPL-CCNC: 53 U/L — HIGH (ref 15–37)
BACTERIA # UR AUTO: ABNORMAL /HPF
BASOPHILS # BLD AUTO: 0.02 K/UL — SIGNIFICANT CHANGE UP (ref 0–0.2)
BASOPHILS NFR BLD AUTO: 0.3 % — SIGNIFICANT CHANGE UP (ref 0–2)
BILIRUB SERPL-MCNC: 0.9 MG/DL — SIGNIFICANT CHANGE UP (ref 0.2–1.2)
BILIRUB UR-MCNC: NEGATIVE — SIGNIFICANT CHANGE UP
BUN SERPL-MCNC: 28 MG/DL — HIGH (ref 7–23)
CALCIUM SERPL-MCNC: 9.2 MG/DL — SIGNIFICANT CHANGE UP (ref 8.5–10.1)
CAST: 1 /LPF — SIGNIFICANT CHANGE UP (ref 0–4)
CHLORIDE SERPL-SCNC: 109 MMOL/L — HIGH (ref 96–108)
CK SERPL-CCNC: 913 U/L — HIGH (ref 26–192)
CO2 SERPL-SCNC: 23 MMOL/L — SIGNIFICANT CHANGE UP (ref 22–31)
COLOR SPEC: SIGNIFICANT CHANGE UP
CREAT SERPL-MCNC: 0.88 MG/DL — SIGNIFICANT CHANGE UP (ref 0.5–1.3)
DIFF PNL FLD: NEGATIVE — SIGNIFICANT CHANGE UP
EGFR: 67 ML/MIN/1.73M2 — SIGNIFICANT CHANGE UP
EGFR: 67 ML/MIN/1.73M2 — SIGNIFICANT CHANGE UP
EOSINOPHIL # BLD AUTO: 0.02 K/UL — SIGNIFICANT CHANGE UP (ref 0–0.5)
EOSINOPHIL NFR BLD AUTO: 0.3 % — SIGNIFICANT CHANGE UP (ref 0–6)
FLUAV AG NPH QL: SIGNIFICANT CHANGE UP
FLUBV AG NPH QL: SIGNIFICANT CHANGE UP
GLUCOSE SERPL-MCNC: 100 MG/DL — HIGH (ref 70–99)
GLUCOSE UR QL: NEGATIVE MG/DL — SIGNIFICANT CHANGE UP
HCT VFR BLD CALC: 43 % — SIGNIFICANT CHANGE UP (ref 34.5–45)
HGB BLD-MCNC: 14 G/DL — SIGNIFICANT CHANGE UP (ref 11.5–15.5)
IMM GRANULOCYTES # BLD AUTO: 0.04 K/UL — SIGNIFICANT CHANGE UP (ref 0–0.07)
IMM GRANULOCYTES NFR BLD AUTO: 0.5 % — SIGNIFICANT CHANGE UP (ref 0–0.9)
KETONES UR QL: 40 MG/DL
LEUKOCYTE ESTERASE UR-ACNC: ABNORMAL
LYMPHOCYTES # BLD AUTO: 0.82 K/UL — LOW (ref 1–3.3)
LYMPHOCYTES NFR BLD AUTO: 10.5 % — LOW (ref 13–44)
MAGNESIUM SERPL-MCNC: 2 MG/DL — SIGNIFICANT CHANGE UP (ref 1.6–2.6)
MCHC RBC-ENTMCNC: 30.6 PG — SIGNIFICANT CHANGE UP (ref 27–34)
MCHC RBC-ENTMCNC: 32.6 G/DL — SIGNIFICANT CHANGE UP (ref 32–36)
MCV RBC AUTO: 93.9 FL — SIGNIFICANT CHANGE UP (ref 80–100)
MONOCYTES # BLD AUTO: 0.52 K/UL — SIGNIFICANT CHANGE UP (ref 0–0.9)
MONOCYTES NFR BLD AUTO: 6.7 % — SIGNIFICANT CHANGE UP (ref 2–14)
NEUTROPHILS # BLD AUTO: 6.36 K/UL — SIGNIFICANT CHANGE UP (ref 1.8–7.4)
NEUTROPHILS NFR BLD AUTO: 81.7 % — HIGH (ref 43–77)
NITRITE UR-MCNC: NEGATIVE — SIGNIFICANT CHANGE UP
NRBC # BLD AUTO: 0 K/UL — SIGNIFICANT CHANGE UP (ref 0–0)
NRBC # FLD: 0 K/UL — SIGNIFICANT CHANGE UP (ref 0–0)
NRBC BLD AUTO-RTO: 0 /100 WBCS — SIGNIFICANT CHANGE UP (ref 0–0)
PH UR: 5.5 — SIGNIFICANT CHANGE UP (ref 5–8)
PLATELET # BLD AUTO: 145 K/UL — LOW (ref 150–400)
PMV BLD: 9.9 FL — SIGNIFICANT CHANGE UP (ref 7–13)
POTASSIUM SERPL-MCNC: 4.7 MMOL/L — SIGNIFICANT CHANGE UP (ref 3.5–5.3)
POTASSIUM SERPL-SCNC: 4.7 MMOL/L — SIGNIFICANT CHANGE UP (ref 3.5–5.3)
PROT SERPL-MCNC: 7.3 GM/DL — SIGNIFICANT CHANGE UP (ref 6–8.3)
PROT UR-MCNC: SIGNIFICANT CHANGE UP MG/DL
RBC # BLD: 4.58 M/UL — SIGNIFICANT CHANGE UP (ref 3.8–5.2)
RBC # FLD: 13 % — SIGNIFICANT CHANGE UP (ref 10.3–14.5)
RBC CASTS # UR COMP ASSIST: 13 /HPF — HIGH (ref 0–4)
RSV RNA NPH QL NAA+NON-PROBE: SIGNIFICANT CHANGE UP
SARS-COV-2 RNA SPEC QL NAA+PROBE: SIGNIFICANT CHANGE UP
SODIUM SERPL-SCNC: 139 MMOL/L — SIGNIFICANT CHANGE UP (ref 135–145)
SOURCE RESPIRATORY: SIGNIFICANT CHANGE UP
SP GR SPEC: 1.03 — SIGNIFICANT CHANGE UP (ref 1–1.03)
SQUAMOUS # UR AUTO: 3 /HPF — SIGNIFICANT CHANGE UP (ref 0–5)
UROBILINOGEN FLD QL: 1 MG/DL — SIGNIFICANT CHANGE UP (ref 0.2–1)
WBC # BLD: 7.78 K/UL — SIGNIFICANT CHANGE UP (ref 3.8–10.5)
WBC # FLD AUTO: 7.78 K/UL — SIGNIFICANT CHANGE UP (ref 3.8–10.5)
WBC UR QL: 19 /HPF — HIGH (ref 0–5)

## 2025-08-11 PROCEDURE — 87086 URINE CULTURE/COLONY COUNT: CPT

## 2025-08-11 PROCEDURE — 94640 AIRWAY INHALATION TREATMENT: CPT

## 2025-08-11 PROCEDURE — 71045 X-RAY EXAM CHEST 1 VIEW: CPT

## 2025-08-11 PROCEDURE — 71045 X-RAY EXAM CHEST 1 VIEW: CPT | Mod: 26

## 2025-08-11 PROCEDURE — 85027 COMPLETE CBC AUTOMATED: CPT

## 2025-08-11 PROCEDURE — 97530 THERAPEUTIC ACTIVITIES: CPT | Mod: GP

## 2025-08-11 PROCEDURE — 97116 GAIT TRAINING THERAPY: CPT | Mod: GP

## 2025-08-11 PROCEDURE — 99223 1ST HOSP IP/OBS HIGH 75: CPT

## 2025-08-11 PROCEDURE — 36415 COLL VENOUS BLD VENIPUNCTURE: CPT

## 2025-08-11 PROCEDURE — 82550 ASSAY OF CK (CPK): CPT

## 2025-08-11 PROCEDURE — 99285 EMERGENCY DEPT VISIT HI MDM: CPT

## 2025-08-11 PROCEDURE — 97162 PT EVAL MOD COMPLEX 30 MIN: CPT | Mod: GP

## 2025-08-11 PROCEDURE — 83605 ASSAY OF LACTIC ACID: CPT

## 2025-08-11 PROCEDURE — 81001 URINALYSIS AUTO W/SCOPE: CPT

## 2025-08-11 PROCEDURE — 80048 BASIC METABOLIC PNL TOTAL CA: CPT

## 2025-08-11 RX ORDER — METOPROLOL SUCCINATE 50 MG/1
25 TABLET, EXTENDED RELEASE ORAL
Refills: 0 | Status: DISCONTINUED | OUTPATIENT
Start: 2025-08-11 | End: 2025-08-13

## 2025-08-11 RX ORDER — CLONAZEPAM 0.5 MG/1
1 TABLET ORAL AT BEDTIME
Refills: 0 | Status: DISCONTINUED | OUTPATIENT
Start: 2025-08-11 | End: 2025-08-13

## 2025-08-11 RX ORDER — SUVOREXANT 15 MG/1
1 TABLET, FILM COATED ORAL
Refills: 0 | DISCHARGE

## 2025-08-11 RX ORDER — ASPIRIN 325 MG
1 TABLET ORAL
Refills: 0 | DISCHARGE

## 2025-08-11 RX ORDER — CLONAZEPAM 0.5 MG/1
0.5 TABLET ORAL DAILY
Refills: 0 | Status: DISCONTINUED | OUTPATIENT
Start: 2025-08-11 | End: 2025-08-13

## 2025-08-11 RX ORDER — BUPROPION HYDROBROMIDE 522 MG/1
300 TABLET, EXTENDED RELEASE ORAL DAILY
Refills: 0 | Status: DISCONTINUED | OUTPATIENT
Start: 2025-08-11 | End: 2025-08-13

## 2025-08-11 RX ORDER — MIRTAZAPINE 30 MG/1
1 TABLET, FILM COATED ORAL
Refills: 0 | DISCHARGE

## 2025-08-11 RX ORDER — ASPIRIN 325 MG
81 TABLET ORAL DAILY
Refills: 0 | Status: DISCONTINUED | OUTPATIENT
Start: 2025-08-11 | End: 2025-08-13

## 2025-08-11 RX ORDER — EZETIMIBE 10 MG/1
10 TABLET ORAL DAILY
Refills: 0 | Status: DISCONTINUED | OUTPATIENT
Start: 2025-08-11 | End: 2025-08-13

## 2025-08-11 RX ORDER — MELATONIN 5 MG
3 TABLET ORAL AT BEDTIME
Refills: 0 | Status: DISCONTINUED | OUTPATIENT
Start: 2025-08-11 | End: 2025-08-13

## 2025-08-11 RX ORDER — ONDANSETRON HCL/PF 4 MG/2 ML
4 VIAL (ML) INJECTION EVERY 8 HOURS
Refills: 0 | Status: DISCONTINUED | OUTPATIENT
Start: 2025-08-11 | End: 2025-08-13

## 2025-08-11 RX ORDER — TOPIRAMATE 25 MG/1
1 TABLET, FILM COATED ORAL
Refills: 0 | DISCHARGE

## 2025-08-11 RX ORDER — MAGNESIUM, ALUMINUM HYDROXIDE 200-200 MG
30 TABLET,CHEWABLE ORAL EVERY 4 HOURS
Refills: 0 | Status: DISCONTINUED | OUTPATIENT
Start: 2025-08-11 | End: 2025-08-13

## 2025-08-11 RX ORDER — CLONAZEPAM 0.5 MG/1
2 TABLET ORAL
Refills: 0 | DISCHARGE

## 2025-08-11 RX ORDER — VIBEGRON 75 MG/1
1 TABLET, FILM COATED ORAL
Refills: 0 | DISCHARGE

## 2025-08-11 RX ORDER — CITALOPRAM 20 MG/1
40 TABLET ORAL DAILY
Refills: 0 | Status: DISCONTINUED | OUTPATIENT
Start: 2025-08-11 | End: 2025-08-13

## 2025-08-11 RX ORDER — ACETAMINOPHEN 500 MG/5ML
650 LIQUID (ML) ORAL EVERY 6 HOURS
Refills: 0 | Status: DISCONTINUED | OUTPATIENT
Start: 2025-08-11 | End: 2025-08-13

## 2025-08-11 RX ORDER — ARIPIPRAZOLE 2 MG/1
5 TABLET ORAL DAILY
Refills: 0 | Status: DISCONTINUED | OUTPATIENT
Start: 2025-08-11 | End: 2025-08-13

## 2025-08-11 RX ORDER — MIRTAZAPINE 30 MG/1
45 TABLET, FILM COATED ORAL AT BEDTIME
Refills: 0 | Status: DISCONTINUED | OUTPATIENT
Start: 2025-08-11 | End: 2025-08-13

## 2025-08-11 RX ORDER — FUROSEMIDE 10 MG/ML
1 INJECTION INTRAMUSCULAR; INTRAVENOUS
Refills: 0 | DISCHARGE

## 2025-08-11 RX ORDER — TOPIRAMATE 25 MG/1
25 TABLET, FILM COATED ORAL
Refills: 0 | Status: DISCONTINUED | OUTPATIENT
Start: 2025-08-11 | End: 2025-08-13

## 2025-08-11 RX ADMIN — MIRTAZAPINE 45 MILLIGRAM(S): 30 TABLET, FILM COATED ORAL at 21:46

## 2025-08-11 RX ADMIN — EZETIMIBE 10 MILLIGRAM(S): 10 TABLET ORAL at 15:43

## 2025-08-11 RX ADMIN — BUPROPION HYDROBROMIDE 300 MILLIGRAM(S): 522 TABLET, EXTENDED RELEASE ORAL at 15:44

## 2025-08-11 RX ADMIN — Medication 1000 MILLILITER(S): at 07:21

## 2025-08-11 RX ADMIN — TOPIRAMATE 25 MILLIGRAM(S): 25 TABLET, FILM COATED ORAL at 21:47

## 2025-08-11 RX ADMIN — METOPROLOL SUCCINATE 25 MILLIGRAM(S): 50 TABLET, EXTENDED RELEASE ORAL at 21:47

## 2025-08-11 RX ADMIN — Medication 100 MILLILITER(S): at 15:46

## 2025-08-11 RX ADMIN — Medication 81 MILLIGRAM(S): at 15:45

## 2025-08-11 RX ADMIN — CITALOPRAM 40 MILLIGRAM(S): 20 TABLET ORAL at 15:44

## 2025-08-11 RX ADMIN — ARIPIPRAZOLE 5 MILLIGRAM(S): 2 TABLET ORAL at 15:44

## 2025-08-11 RX ADMIN — Medication 100 MILLILITER(S): at 11:28

## 2025-08-11 RX ADMIN — Medication 150 MILLIGRAM(S): at 15:42

## 2025-08-11 RX ADMIN — CLONAZEPAM 1 MILLIGRAM(S): 0.5 TABLET ORAL at 21:47

## 2025-08-12 ENCOUNTER — TRANSCRIPTION ENCOUNTER (OUTPATIENT)
Age: 80
End: 2025-08-12

## 2025-08-12 LAB
ANION GAP SERPL CALC-SCNC: 5 MMOL/L — SIGNIFICANT CHANGE UP (ref 5–17)
BUN SERPL-MCNC: 19 MG/DL — SIGNIFICANT CHANGE UP (ref 7–23)
CALCIUM SERPL-MCNC: 8.4 MG/DL — LOW (ref 8.5–10.1)
CHLORIDE SERPL-SCNC: 113 MMOL/L — HIGH (ref 96–108)
CK SERPL-CCNC: 445 U/L — HIGH (ref 26–192)
CK SERPL-CCNC: 468 U/L — HIGH (ref 26–192)
CO2 SERPL-SCNC: 22 MMOL/L — SIGNIFICANT CHANGE UP (ref 22–31)
CREAT SERPL-MCNC: 0.74 MG/DL — SIGNIFICANT CHANGE UP (ref 0.5–1.3)
CULTURE RESULTS: SIGNIFICANT CHANGE UP
EGFR: 82 ML/MIN/1.73M2 — SIGNIFICANT CHANGE UP
EGFR: 82 ML/MIN/1.73M2 — SIGNIFICANT CHANGE UP
GLUCOSE SERPL-MCNC: 106 MG/DL — HIGH (ref 70–99)
HCT VFR BLD CALC: 35 % — SIGNIFICANT CHANGE UP (ref 34.5–45)
HCT VFR BLD CALC: 38 % — SIGNIFICANT CHANGE UP (ref 34.5–45)
HGB BLD-MCNC: 11.3 G/DL — LOW (ref 11.5–15.5)
HGB BLD-MCNC: 12 G/DL — SIGNIFICANT CHANGE UP (ref 11.5–15.5)
LACTATE SERPL-SCNC: 0.6 MMOL/L — LOW (ref 0.7–2)
MCHC RBC-ENTMCNC: 30.2 PG — SIGNIFICANT CHANGE UP (ref 27–34)
MCHC RBC-ENTMCNC: 30.9 PG — SIGNIFICANT CHANGE UP (ref 27–34)
MCHC RBC-ENTMCNC: 31.6 G/DL — LOW (ref 32–36)
MCHC RBC-ENTMCNC: 32.3 G/DL — SIGNIFICANT CHANGE UP (ref 32–36)
MCV RBC AUTO: 95.6 FL — SIGNIFICANT CHANGE UP (ref 80–100)
MCV RBC AUTO: 95.7 FL — SIGNIFICANT CHANGE UP (ref 80–100)
NRBC # BLD AUTO: 0 K/UL — SIGNIFICANT CHANGE UP (ref 0–0)
NRBC # BLD AUTO: 0 K/UL — SIGNIFICANT CHANGE UP (ref 0–0)
NRBC # FLD: 0 K/UL — SIGNIFICANT CHANGE UP (ref 0–0)
NRBC # FLD: 0 K/UL — SIGNIFICANT CHANGE UP (ref 0–0)
NRBC BLD AUTO-RTO: 0 /100 WBCS — SIGNIFICANT CHANGE UP (ref 0–0)
NRBC BLD AUTO-RTO: 0 /100 WBCS — SIGNIFICANT CHANGE UP (ref 0–0)
PLATELET # BLD AUTO: 136 K/UL — LOW (ref 150–400)
PLATELET # BLD AUTO: 138 K/UL — LOW (ref 150–400)
PMV BLD: 9.8 FL — SIGNIFICANT CHANGE UP (ref 7–13)
PMV BLD: 9.9 FL — SIGNIFICANT CHANGE UP (ref 7–13)
POTASSIUM SERPL-MCNC: 3.6 MMOL/L — SIGNIFICANT CHANGE UP (ref 3.5–5.3)
POTASSIUM SERPL-SCNC: 3.6 MMOL/L — SIGNIFICANT CHANGE UP (ref 3.5–5.3)
RBC # BLD: 3.66 M/UL — LOW (ref 3.8–5.2)
RBC # BLD: 3.97 M/UL — SIGNIFICANT CHANGE UP (ref 3.8–5.2)
RBC # FLD: 13.2 % — SIGNIFICANT CHANGE UP (ref 10.3–14.5)
RBC # FLD: 13.4 % — SIGNIFICANT CHANGE UP (ref 10.3–14.5)
SODIUM SERPL-SCNC: 140 MMOL/L — SIGNIFICANT CHANGE UP (ref 135–145)
SPECIMEN SOURCE: SIGNIFICANT CHANGE UP
WBC # BLD: 6.36 K/UL — SIGNIFICANT CHANGE UP (ref 3.8–10.5)
WBC # BLD: 6.92 K/UL — SIGNIFICANT CHANGE UP (ref 3.8–10.5)
WBC # FLD AUTO: 6.36 K/UL — SIGNIFICANT CHANGE UP (ref 3.8–10.5)
WBC # FLD AUTO: 6.92 K/UL — SIGNIFICANT CHANGE UP (ref 3.8–10.5)

## 2025-08-12 PROCEDURE — 71045 X-RAY EXAM CHEST 1 VIEW: CPT | Mod: 26

## 2025-08-12 PROCEDURE — 99233 SBSQ HOSP IP/OBS HIGH 50: CPT

## 2025-08-12 RX ORDER — ENOXAPARIN SODIUM 100 MG/ML
40 INJECTION SUBCUTANEOUS ONCE
Refills: 0 | Status: COMPLETED | OUTPATIENT
Start: 2025-08-12 | End: 2025-08-12

## 2025-08-12 RX ORDER — IPRATROPIUM BROMIDE AND ALBUTEROL SULFATE .5; 2.5 MG/3ML; MG/3ML
3 SOLUTION RESPIRATORY (INHALATION) ONCE
Refills: 0 | Status: COMPLETED | OUTPATIENT
Start: 2025-08-12 | End: 2025-08-12

## 2025-08-12 RX ORDER — METHYLPREDNISOLONE ACETATE 80 MG/ML
60 INJECTION, SUSPENSION INTRA-ARTICULAR; INTRALESIONAL; INTRAMUSCULAR; SOFT TISSUE ONCE
Refills: 0 | Status: COMPLETED | OUTPATIENT
Start: 2025-08-12 | End: 2025-08-12

## 2025-08-12 RX ORDER — HEPARIN SODIUM 1000 [USP'U]/ML
5000 INJECTION INTRAVENOUS; SUBCUTANEOUS EVERY 8 HOURS
Refills: 0 | Status: DISCONTINUED | OUTPATIENT
Start: 2025-08-12 | End: 2025-08-12

## 2025-08-12 RX ADMIN — TOPIRAMATE 25 MILLIGRAM(S): 25 TABLET, FILM COATED ORAL at 09:41

## 2025-08-12 RX ADMIN — METOPROLOL SUCCINATE 25 MILLIGRAM(S): 50 TABLET, EXTENDED RELEASE ORAL at 21:24

## 2025-08-12 RX ADMIN — CLONAZEPAM 1 MILLIGRAM(S): 0.5 TABLET ORAL at 21:30

## 2025-08-12 RX ADMIN — Medication 81 MILLIGRAM(S): at 05:41

## 2025-08-12 RX ADMIN — IPRATROPIUM BROMIDE AND ALBUTEROL SULFATE 3 MILLILITER(S): .5; 2.5 SOLUTION RESPIRATORY (INHALATION) at 22:14

## 2025-08-12 RX ADMIN — BUPROPION HYDROBROMIDE 300 MILLIGRAM(S): 522 TABLET, EXTENDED RELEASE ORAL at 09:41

## 2025-08-12 RX ADMIN — CITALOPRAM 40 MILLIGRAM(S): 20 TABLET ORAL at 09:41

## 2025-08-12 RX ADMIN — MIRTAZAPINE 45 MILLIGRAM(S): 30 TABLET, FILM COATED ORAL at 21:23

## 2025-08-12 RX ADMIN — ARIPIPRAZOLE 5 MILLIGRAM(S): 2 TABLET ORAL at 09:41

## 2025-08-12 RX ADMIN — Medication 150 MILLIGRAM(S): at 05:41

## 2025-08-12 RX ADMIN — EZETIMIBE 10 MILLIGRAM(S): 10 TABLET ORAL at 09:41

## 2025-08-12 RX ADMIN — ENOXAPARIN SODIUM 40 MILLIGRAM(S): 100 INJECTION SUBCUTANEOUS at 09:43

## 2025-08-12 RX ADMIN — TOPIRAMATE 25 MILLIGRAM(S): 25 TABLET, FILM COATED ORAL at 21:23

## 2025-08-12 RX ADMIN — METHYLPREDNISOLONE ACETATE 60 MILLIGRAM(S): 80 INJECTION, SUSPENSION INTRA-ARTICULAR; INTRALESIONAL; INTRAMUSCULAR; SOFT TISSUE at 22:24

## 2025-08-12 RX ADMIN — METOPROLOL SUCCINATE 25 MILLIGRAM(S): 50 TABLET, EXTENDED RELEASE ORAL at 09:43

## 2025-08-13 ENCOUNTER — TRANSCRIPTION ENCOUNTER (OUTPATIENT)
Age: 80
End: 2025-08-13

## 2025-08-13 VITALS
TEMPERATURE: 98 F | RESPIRATION RATE: 18 BRPM | HEART RATE: 86 BPM | SYSTOLIC BLOOD PRESSURE: 147 MMHG | DIASTOLIC BLOOD PRESSURE: 73 MMHG | OXYGEN SATURATION: 95 %

## 2025-08-13 LAB
ANION GAP SERPL CALC-SCNC: 5 MMOL/L — SIGNIFICANT CHANGE UP (ref 5–17)
BUN SERPL-MCNC: 20 MG/DL — SIGNIFICANT CHANGE UP (ref 7–23)
CALCIUM SERPL-MCNC: 8.9 MG/DL — SIGNIFICANT CHANGE UP (ref 8.5–10.1)
CHLORIDE SERPL-SCNC: 112 MMOL/L — HIGH (ref 96–108)
CO2 SERPL-SCNC: 24 MMOL/L — SIGNIFICANT CHANGE UP (ref 22–31)
CREAT SERPL-MCNC: 0.78 MG/DL — SIGNIFICANT CHANGE UP (ref 0.5–1.3)
EGFR: 77 ML/MIN/1.73M2 — SIGNIFICANT CHANGE UP
EGFR: 77 ML/MIN/1.73M2 — SIGNIFICANT CHANGE UP
GLUCOSE SERPL-MCNC: 174 MG/DL — HIGH (ref 70–99)
HCT VFR BLD CALC: 39.2 % — SIGNIFICANT CHANGE UP (ref 34.5–45)
HGB BLD-MCNC: 12.6 G/DL — SIGNIFICANT CHANGE UP (ref 11.5–15.5)
MCHC RBC-ENTMCNC: 30.6 PG — SIGNIFICANT CHANGE UP (ref 27–34)
MCHC RBC-ENTMCNC: 32.1 G/DL — SIGNIFICANT CHANGE UP (ref 32–36)
MCV RBC AUTO: 95.1 FL — SIGNIFICANT CHANGE UP (ref 80–100)
NRBC # BLD AUTO: 0 K/UL — SIGNIFICANT CHANGE UP (ref 0–0)
NRBC # FLD: 0 K/UL — SIGNIFICANT CHANGE UP (ref 0–0)
NRBC BLD AUTO-RTO: 0 /100 WBCS — SIGNIFICANT CHANGE UP (ref 0–0)
PLATELET # BLD AUTO: 131 K/UL — LOW (ref 150–400)
PMV BLD: 10.1 FL — SIGNIFICANT CHANGE UP (ref 7–13)
POTASSIUM SERPL-MCNC: 4 MMOL/L — SIGNIFICANT CHANGE UP (ref 3.5–5.3)
POTASSIUM SERPL-SCNC: 4 MMOL/L — SIGNIFICANT CHANGE UP (ref 3.5–5.3)
RBC # BLD: 4.12 M/UL — SIGNIFICANT CHANGE UP (ref 3.8–5.2)
RBC # FLD: 12.9 % — SIGNIFICANT CHANGE UP (ref 10.3–14.5)
SODIUM SERPL-SCNC: 141 MMOL/L — SIGNIFICANT CHANGE UP (ref 135–145)
WBC # BLD: 4.61 K/UL — SIGNIFICANT CHANGE UP (ref 3.8–10.5)
WBC # FLD AUTO: 4.61 K/UL — SIGNIFICANT CHANGE UP (ref 3.8–10.5)

## 2025-08-13 PROCEDURE — 99239 HOSP IP/OBS DSCHRG MGMT >30: CPT

## 2025-08-13 RX ORDER — ENOXAPARIN SODIUM 100 MG/ML
40 INJECTION SUBCUTANEOUS EVERY 24 HOURS
Refills: 0 | Status: DISCONTINUED | OUTPATIENT
Start: 2025-08-13 | End: 2025-08-13

## 2025-08-13 RX ORDER — FUROSEMIDE 10 MG/ML
40 INJECTION INTRAMUSCULAR; INTRAVENOUS ONCE
Refills: 0 | Status: COMPLETED | OUTPATIENT
Start: 2025-08-13 | End: 2025-08-13

## 2025-08-13 RX ADMIN — Medication 10 MILLIEQUIVALENT(S): at 11:24

## 2025-08-13 RX ADMIN — Medication 81 MILLIGRAM(S): at 05:22

## 2025-08-13 RX ADMIN — ARIPIPRAZOLE 5 MILLIGRAM(S): 2 TABLET ORAL at 09:30

## 2025-08-13 RX ADMIN — CITALOPRAM 40 MILLIGRAM(S): 20 TABLET ORAL at 09:29

## 2025-08-13 RX ADMIN — METOPROLOL SUCCINATE 25 MILLIGRAM(S): 50 TABLET, EXTENDED RELEASE ORAL at 09:28

## 2025-08-13 RX ADMIN — EZETIMIBE 10 MILLIGRAM(S): 10 TABLET ORAL at 09:30

## 2025-08-13 RX ADMIN — TOPIRAMATE 25 MILLIGRAM(S): 25 TABLET, FILM COATED ORAL at 09:29

## 2025-08-13 RX ADMIN — FUROSEMIDE 40 MILLIGRAM(S): 10 INJECTION INTRAMUSCULAR; INTRAVENOUS at 11:24

## 2025-08-13 RX ADMIN — ENOXAPARIN SODIUM 40 MILLIGRAM(S): 100 INJECTION SUBCUTANEOUS at 11:25

## 2025-08-13 RX ADMIN — BUPROPION HYDROBROMIDE 300 MILLIGRAM(S): 522 TABLET, EXTENDED RELEASE ORAL at 09:30

## 2025-08-13 RX ADMIN — Medication 150 MILLIGRAM(S): at 05:23

## 2025-08-19 DIAGNOSIS — E03.9 HYPOTHYROIDISM, UNSPECIFIED: ICD-10-CM

## 2025-08-19 DIAGNOSIS — D69.6 THROMBOCYTOPENIA, UNSPECIFIED: ICD-10-CM

## 2025-08-19 DIAGNOSIS — R53.1 WEAKNESS: ICD-10-CM

## 2025-08-19 DIAGNOSIS — Z79.899 OTHER LONG TERM (CURRENT) DRUG THERAPY: ICD-10-CM

## 2025-08-19 DIAGNOSIS — E78.5 HYPERLIPIDEMIA, UNSPECIFIED: ICD-10-CM

## 2025-08-19 DIAGNOSIS — Z88.0 ALLERGY STATUS TO PENICILLIN: ICD-10-CM

## 2025-08-19 DIAGNOSIS — I10 ESSENTIAL (PRIMARY) HYPERTENSION: ICD-10-CM

## 2025-08-19 DIAGNOSIS — F32.A DEPRESSION, UNSPECIFIED: ICD-10-CM

## 2025-08-19 DIAGNOSIS — Z91.09 OTHER ALLERGY STATUS, OTHER THAN TO DRUGS AND BIOLOGICAL SUBSTANCES: ICD-10-CM

## 2025-08-19 DIAGNOSIS — M62.82 RHABDOMYOLYSIS: ICD-10-CM

## 2025-08-26 ENCOUNTER — APPOINTMENT (OUTPATIENT)
Dept: FAMILY MEDICINE | Facility: CLINIC | Age: 80
End: 2025-08-26
Payer: MEDICARE

## 2025-08-26 VITALS
SYSTOLIC BLOOD PRESSURE: 132 MMHG | WEIGHT: 189 LBS | HEIGHT: 60 IN | BODY MASS INDEX: 37.11 KG/M2 | DIASTOLIC BLOOD PRESSURE: 88 MMHG | TEMPERATURE: 97.1 F | HEART RATE: 86 BPM | OXYGEN SATURATION: 96 %

## 2025-08-26 DIAGNOSIS — E78.00 PURE HYPERCHOLESTEROLEMIA, UNSPECIFIED: ICD-10-CM

## 2025-08-26 DIAGNOSIS — Z00.00 ENCOUNTER FOR GENERAL ADULT MEDICAL EXAMINATION W/OUT ABNORMAL FINDINGS: ICD-10-CM

## 2025-08-26 DIAGNOSIS — I10 ESSENTIAL (PRIMARY) HYPERTENSION: ICD-10-CM

## 2025-08-26 DIAGNOSIS — E03.9 HYPOTHYROIDISM, UNSPECIFIED: ICD-10-CM

## 2025-08-26 PROCEDURE — G0439: CPT

## 2025-08-26 RX ORDER — ARIPIPRAZOLE 5 MG/1
5 TABLET ORAL DAILY
Refills: 0 | Status: ACTIVE | COMMUNITY
Start: 2025-08-26

## 2025-08-26 RX ORDER — CLONAZEPAM 0.5 MG/1
0.5 TABLET ORAL
Refills: 0 | Status: ACTIVE | COMMUNITY
Start: 2025-08-26

## 2025-08-26 RX ORDER — TOPIRAMATE 25 MG/1
25 TABLET, FILM COATED ORAL TWICE DAILY
Refills: 0 | Status: ACTIVE | COMMUNITY
Start: 2025-08-26

## 2025-09-09 ENCOUNTER — APPOINTMENT (OUTPATIENT)
Dept: FAMILY MEDICINE | Facility: CLINIC | Age: 80
End: 2025-09-09
Payer: MEDICARE

## 2025-09-09 VITALS
HEIGHT: 60 IN | DIASTOLIC BLOOD PRESSURE: 80 MMHG | HEART RATE: 76 BPM | OXYGEN SATURATION: 96 % | WEIGHT: 189 LBS | SYSTOLIC BLOOD PRESSURE: 144 MMHG | BODY MASS INDEX: 37.11 KG/M2 | TEMPERATURE: 97.6 F

## 2025-09-09 DIAGNOSIS — R41.3 OTHER AMNESIA: ICD-10-CM

## 2025-09-09 DIAGNOSIS — R44.3 HALLUCINATIONS, UNSPECIFIED: ICD-10-CM

## 2025-09-09 PROCEDURE — 36415 COLL VENOUS BLD VENIPUNCTURE: CPT

## 2025-09-09 PROCEDURE — 99214 OFFICE O/P EST MOD 30 MIN: CPT

## 2025-09-09 PROCEDURE — G2211 COMPLEX E/M VISIT ADD ON: CPT

## 2025-09-11 ENCOUNTER — APPOINTMENT (OUTPATIENT)
Dept: CT IMAGING | Facility: CLINIC | Age: 80
End: 2025-09-11
Payer: MEDICARE

## 2025-09-11 PROCEDURE — 70450 CT HEAD/BRAIN W/O DYE: CPT | Mod: 26

## 2025-09-14 LAB
BASOPHILS # BLD AUTO: 0.06 K/UL
BASOPHILS NFR BLD AUTO: 1 %
EOSINOPHIL # BLD AUTO: 0.19 K/UL
EOSINOPHIL NFR BLD AUTO: 3.3 %
FERRITIN SERPL-MCNC: 207 NG/ML
HCT VFR BLD CALC: 40.8 %
HGB BLD-MCNC: 13 G/DL
IMM GRANULOCYTES NFR BLD AUTO: 0.3 %
LYMPHOCYTES # BLD AUTO: 1.41 K/UL
LYMPHOCYTES NFR BLD AUTO: 24.4 %
MAN DIFF?: NORMAL
MCHC RBC-ENTMCNC: 30.2 PG
MCHC RBC-ENTMCNC: 31.9 G/DL
MCV RBC AUTO: 94.9 FL
MONOCYTES # BLD AUTO: 0.62 K/UL
MONOCYTES NFR BLD AUTO: 10.7 %
NEUTROPHILS # BLD AUTO: 3.48 K/UL
NEUTROPHILS NFR BLD AUTO: 60.3 %
PLATELET # BLD AUTO: 190 K/UL
RBC # BLD: 4.3 M/UL
RBC # FLD: 13.6 %
TSH SERPL-ACNC: 0.51 UIU/ML
WBC # FLD AUTO: 5.78 K/UL

## 2025-09-18 ENCOUNTER — NON-APPOINTMENT (OUTPATIENT)
Age: 80
End: 2025-09-18